# Patient Record
Sex: MALE | Race: ASIAN | NOT HISPANIC OR LATINO | Employment: FULL TIME | URBAN - METROPOLITAN AREA
[De-identification: names, ages, dates, MRNs, and addresses within clinical notes are randomized per-mention and may not be internally consistent; named-entity substitution may affect disease eponyms.]

---

## 2017-05-30 ENCOUNTER — OFFICE VISIT (OUTPATIENT)
Dept: FAMILY MEDICINE | Facility: CLINIC | Age: 62
End: 2017-05-30
Payer: COMMERCIAL

## 2017-05-30 VITALS
HEIGHT: 70 IN | DIASTOLIC BLOOD PRESSURE: 96 MMHG | RESPIRATION RATE: 12 BRPM | SYSTOLIC BLOOD PRESSURE: 146 MMHG | HEART RATE: 73 BPM | WEIGHT: 172.7 LBS | BODY MASS INDEX: 24.73 KG/M2 | OXYGEN SATURATION: 97 % | TEMPERATURE: 98.5 F

## 2017-05-30 DIAGNOSIS — M21.612 BUNION, LEFT: ICD-10-CM

## 2017-05-30 DIAGNOSIS — Z11.59 NEED FOR HEPATITIS C SCREENING TEST: ICD-10-CM

## 2017-05-30 DIAGNOSIS — Z00.01 ENCOUNTER FOR ROUTINE ADULT HEALTH EXAMINATION WITH ABNORMAL FINDINGS: Primary | ICD-10-CM

## 2017-05-30 DIAGNOSIS — M21.611 BUNION, RIGHT: ICD-10-CM

## 2017-05-30 DIAGNOSIS — R03.0 ELEVATED BLOOD PRESSURE READING WITHOUT DIAGNOSIS OF HYPERTENSION: ICD-10-CM

## 2017-05-30 PROCEDURE — 99396 PREV VISIT EST AGE 40-64: CPT | Performed by: PHYSICIAN ASSISTANT

## 2017-05-30 NOTE — MR AVS SNAPSHOT
After Visit Summary   5/30/2017    Shelia Ortiz    MRN: [id number]           Patient Information     Date Of Birth          1955        Visit Information        Provider Department      5/30/2017 11:20 AM Georgia Burleson PA-C [facility]        Today's Diagnoses     Need for hepatitis C screening test    -  1    Encounter for routine adult health examination with abnormal findings        Elevated blood pressure reading without diagnosis of hypertension          Care Instructions    Please schedule a fasting lab appointment (nothing to eat or drink 12 hours prior except water and/or your medications) at your earliest convenience.       No more than 2 grams of sodium daily.  Schedule MA visit in 3-4 weeks if either number greater than 140/90   Work on diet and exercise    Bunion --consider follow up with podiatry     Preventive Health Recommendations  Male Ages 50 - 64    Yearly exam:             See your health care provider every year in order to  o   Review health changes.   o   Discuss preventive care.    o   Review your medicines if your doctor has prescribed any.     Have a cholesterol test every 5 years, or more frequently if you are at risk for high cholesterol/heart disease.     Have a diabetes test (fasting glucose) every three years. If you are at risk for diabetes, you should have this test more often.     Have a colonoscopy at age 50, or have a yearly FIT test (stool test). These exams will check for colon cancer.      Talk with your health care provider about whether or not a prostate cancer screening test (PSA) is right for you.    You should be tested each year for STDs (sexually transmitted diseases), if you re at risk.     Shots: Get a flu shot each year. Get a tetanus shot every 10 years.     Nutrition:    Eat at least 5 servings of fruits and vegetables daily.     Eat whole-grain bread, whole-wheat pasta and brown rice instead of white grains and rice.      Talk to your provider about Calcium and Vitamin D.     Lifestyle    Exercise for at least 150 minutes a week (30 minutes a day, 5 days a week). This will help you control your weight and prevent disease.     Limit alcohol to one drink per day.     No smoking.     Wear sunscreen to prevent skin cancer.     See your dentist every six months for an exam and cleaning.     See your eye doctor every 1 to 2 years.            Follow-ups after your visit        Future tests that were ordered for you today     Open Future Orders        Priority Expected Expires Ordered    GLUCOSE Routine  5/30/2018 5/30/2017    Hepatitis C Screen Reflex to HCV RNA Quant and Genotype Routine  5/30/2018 5/30/2017    Lipid panel reflex to direct LDL Routine  5/30/2018 5/30/2017    PSA, screen Routine  5/30/2018 5/30/2017    Comprehensive metabolic panel Routine  5/30/2018 5/30/2017            Who to contact     If you have questions or need follow up information about today's clinic visit or your schedule please contact [facility] directly at [phone].  Normal or non-critical lab and imaging results will be communicated to you by FRESShart, letter or phone within 4 business days after the clinic has received the results. If you do not hear from us within 7 days, please contact the clinic through EQ workst or phone. If you have a critical or abnormal lab result, we will notify you by phone as soon as possible.  Submit refill requests through [employer] or call your pharmacy and they will forward the refill request to us. Please allow 3 business days for your refill to be completed.          Additional Information About Your Visit        [employer] Information     [employer] gives you secure access to your electronic health record. If you see a primary care provider, you can also send messages to your care team and make appointments. If you have questions, please call your primary care clinic.  If you do not have a primary care  "provider, please call [phone] and they will assist you.        Care EveryWhere ID     This is your Care EveryWhere ID. This could be used by other organizations to access your [place] medical records  [id number]        Your Vitals Were     Pulse Temperature Respirations Height Pulse Oximetry BMI (Body Mass Index)    73 98.5  F (36.9  C) (Oral) 12 1.778 m (5' 10\") 97% 24.78 kg/m2       Blood Pressure from Last 3 Encounters:   05/30/17 (!) 146/96   10/24/13 (!) 129/91   01/30/13 132/66    Weight from Last 3 Encounters:   05/30/17 78.3 kg (172 lb 11.2 oz)   10/17/13 72.6 kg (160 lb)   01/30/13 76 kg (167 lb 9.6 oz)               Primary Care Provider Office Phone #    [facility] [phone]       No address on file        Thank you!     Thank you for choosing [facility]  for your care. Our goal is always to provide you with excellent care. Hearing back from our patients is one way we can continue to improve our services. Please take a few minutes to complete the written survey that you may receive in the mail after your visit with us. Thank you!             Your Updated Medication List - Protect others around you: Learn how to safely use, store and throw away your medicines at www.disposemymeds.org.          This list is accurate as of: 5/30/17 11:26 AM.  Always use your most recent med list.                   Brand Name Dispense Instructions for use    DAILY MULTIVITAMIN PO      Take 1 tablet by mouth daily.       omega-3 fatty acids 1200 MG capsule      Take 1 capsule by mouth daily.         "

## 2017-05-30 NOTE — PROGRESS NOTES
SUBJECTIVE:     CC: Shelia Ortiz is an 61 year old male who presents for preventative health visit.     Healthy Habits:    Do you get at least three servings of calcium containing foods daily (dairy, green leafy vegetables, etc.)? yes    Amount of exercise or daily activities, outside of work: 2 day(s) per week    Problems taking medications regularly No    Medication side effects: No    Have you had an eye exam in the past two years? yes    Do you see a dentist twice per year? yes    Do you have sleep apnea, excessive snoring or daytime drowsiness?no            Today's PHQ-2 Score:   PHQ-2 ( 1999 Pfizer) 1/30/2013 12/8/2011   Q1: Little interest or pleasure in doing things 0 0   Q2: Feeling down, depressed or hopeless 0 0   PHQ-2 Score 0 0       Abuse: Current or Past(Physical, Sexual or Emotional)- No  Do you feel safe in your environment - Yes    Social History   Substance Use Topics     Smoking status: Never Smoker     Smokeless tobacco: Never Used     Alcohol use No     The patient does not drink >3 drinks per day nor >7 drinks per week.    Last PSA:   PSA   Date Value Ref Range Status   01/30/2013 0.74 0 - 4 ug/L Final       Recent Labs   Lab Test  01/30/13   1046  12/08/11   1057   CHOL  184  184   HDL  37*  36*   LDL  114  116   TRIG  159*  157*   CHOLHDLRATIO  4.9  5.0       Reviewed orders with patient. Reviewed health maintenance and updated orders accordingly - Yes    Reviewed and updated as needed this visit by clinical staff  Tobacco  Allergies  Meds  Med Hx  Surg Hx  Fam Hx  Soc Hx        Reviewed and updated as needed this visit by Provider        Has noted enlargement of feet at MTP of both great toes.  Reports when initially bought shoes they seemed a bit large in width but over the years has noted seem tight at MTP of both feet.  No pain.  Wonders about gout.  Has family history  Of gout    Lesion left side of neck seemed to get hard and then fell off and recurred.  Not increasing in size  "or changing in appearance but was concerned because hard and now soft.      ROS:  C: NEGATIVE for fever, chills, change in weight  I: NEGATIVE for worrisome rashes, moles or lesions  E: NEGATIVE for vision changes or irritation  ENT: NEGATIVE for ear, mouth and throat problems  R: NEGATIVE for significant cough or SOB  CV: NEGATIVE for chest pain, palpitations or peripheral edema  GI: NEGATIVE for nausea, abdominal pain, heartburn, or change in bowel habits   male: negative for dysuria, hematuria, decreased urinary stream, erectile dysfunction, urethral discharge  M: NEGATIVE for significant arthralgias or myalgia  N: NEGATIVE for weakness, dizziness or paresthesias  P: NEGATIVE for changes in mood or affect    Problem list, Medication list, Allergies, and Medical/Social/Surgical histories reviewed in EPIC and updated as appropriate.  OBJECTIVE:     /86 (BP Location: Right arm, Patient Position: Chair, Cuff Size: Adult Regular)  Pulse 73  Temp 98.5  F (36.9  C) (Oral)  Resp 12  Ht 1.778 m (5' 10\")  Wt 78.3 kg (172 lb 11.2 oz)  SpO2 97%  BMI 24.78 kg/m2  EXAM:  GENERAL: healthy, alert and no distress  EYES: Eyes grossly normal to inspection, PERRL and conjunctivae and sclerae normal  HENT: ear canals and TM's normal, nose and mouth without ulcers or lesions  NECK: no adenopathy, no asymmetry, masses, or scars and thyroid normal to palpation  RESP: lungs clear to auscultation - no rales, rhonchi or wheezes  CV: regular rate and rhythm, normal S1 S2, no S3 or S4, no murmur, click or rub, no peripheral edema and peripheral pulses strong  ABDOMEN: soft, nontender, no hepatosplenomegaly, no masses and bowel sounds normal   (male): normal male genitalia without lesions or urethral discharge, no hernia  RECTAL: normal sphincter tone, no rectal masses, prostate normal size, smooth, nontender without nodules or masses  MS: bilateral at MTP of both great toes with large nontender firm nodules, no erythema or " "warmth   SKIN: no suspicious lesions or rashes  NEURO: Normal strength and tone, mentation intact and speech normal  PSYCH: mentation appears normal, affect normal/bright    ASSESSMENT/PLAN:     1. Encounter for routine adult health examination with abnormal findings  Will return for fasting labs   - GLUCOSE; Future  - Hepatitis C Screen Reflex to HCV RNA Quant and Genotype; Future  - Lipid panel reflex to direct LDL; Future  - PSA, screen; Future  - Comprehensive metabolic panel; Future  - Vitamin D Deficiency; Future    2. Elevated blood pressure reading without diagnosis of hypertension  Does eat out with traveling and admittedly likes salt.  First hypertensive reading but family history  Of CVA, heart and brother recently started on antihypertensive  - Comprehensive metabolic panel; Future    3. Need for hepatitis C screening test  Will screen for hepatitis C when returns for fasting labs     4. Lawanda, left  Reassured -offered podiatry declines at this time     5. Lawanda, right  As above       COUNSELING:  Reviewed preventive health counseling, as reflected in patient instructions       Regular exercise       Healthy diet/nutrition       Vision screening       Consider Hep C screening for patients born between 1945 and 1965    BP Screening:   Last 3 BP Readings:    BP Readings from Last 3 Encounters:   05/30/17 (!) 146/96   10/24/13 (!) 129/91   01/30/13 132/66       The following was recommended to the patient:  Re-screen within 4 weeks and recommend lifestyle modifications     reports that he has never smoked. He has never used smokeless tobacco.    Estimated body mass index is 24.78 kg/(m^2) as calculated from the following:    Height as of this encounter: 1.778 m (5' 10\").    Weight as of this encounter: 78.3 kg (172 lb 11.2 oz).       Counseling Resources:  ATP IV Guidelines  Pooled Cohorts Equation Calculator  FRAX Risk Assessment  ICSI Preventive Guidelines  Dietary Guidelines for Americans, 2010  USDA's " MyPlate  ASA Prophylaxis  Lung CA Screening    Georgia Burleson PA-C  [facility]

## 2017-05-30 NOTE — NURSING NOTE
"Chief Complaint   Patient presents with     Physical     non fasting       Initial /86 (BP Location: Right arm, Patient Position: Chair, Cuff Size: Adult Regular)  Pulse 73  Temp 98.5  F (36.9  C) (Oral)  Resp 12  Ht 1.778 m (5' 10\")  Wt 78.3 kg (172 lb 11.2 oz)  SpO2 97%  BMI 24.78 kg/m2 Estimated body mass index is 24.78 kg/(m^2) as calculated from the following:    Height as of this encounter: 1.778 m (5' 10\").    Weight as of this encounter: 78.3 kg (172 lb 11.2 oz).  Medication Reconciliation: complete     Heidi Benavidez MA    "

## 2017-05-30 NOTE — PATIENT INSTRUCTIONS
Please schedule a fasting lab appointment (nothing to eat or drink 12 hours prior except water and/or your medications) at your earliest convenience.       No more than 2 grams of sodium daily.  Schedule MA visit in 3-4 weeks if either number greater than 140/90   Work on diet and exercise    Bunion --consider follow up with podiatry     Preventive Health Recommendations  Male Ages 50 - 64    Yearly exam:             See your health care provider every year in order to  o   Review health changes.   o   Discuss preventive care.    o   Review your medicines if your doctor has prescribed any.     Have a cholesterol test every 5 years, or more frequently if you are at risk for high cholesterol/heart disease.     Have a diabetes test (fasting glucose) every three years. If you are at risk for diabetes, you should have this test more often.     Have a colonoscopy at age 50, or have a yearly FIT test (stool test). These exams will check for colon cancer.      Talk with your health care provider about whether or not a prostate cancer screening test (PSA) is right for you.    You should be tested each year for STDs (sexually transmitted diseases), if you re at risk.     Shots: Get a flu shot each year. Get a tetanus shot every 10 years.     Nutrition:    Eat at least 5 servings of fruits and vegetables daily.     Eat whole-grain bread, whole-wheat pasta and brown rice instead of white grains and rice.     Talk to your provider about Calcium and Vitamin D.     Lifestyle    Exercise for at least 150 minutes a week (30 minutes a day, 5 days a week). This will help you control your weight and prevent disease.     Limit alcohol to one drink per day.     No smoking.     Wear sunscreen to prevent skin cancer.     See your dentist every six months for an exam and cleaning.     See your eye doctor every 1 to 2 years.

## 2017-05-31 DIAGNOSIS — R03.0 ELEVATED BLOOD PRESSURE READING WITHOUT DIAGNOSIS OF HYPERTENSION: ICD-10-CM

## 2017-05-31 DIAGNOSIS — Z00.01 ENCOUNTER FOR ROUTINE ADULT HEALTH EXAMINATION WITH ABNORMAL FINDINGS: ICD-10-CM

## 2017-05-31 LAB
ALBUMIN SERPL-MCNC: 4 G/DL (ref 3.4–5)
ALP SERPL-CCNC: 92 U/L (ref 40–150)
ALT SERPL W P-5'-P-CCNC: 39 U/L (ref 0–70)
ANION GAP SERPL CALCULATED.3IONS-SCNC: 5 MMOL/L (ref 3–14)
AST SERPL W P-5'-P-CCNC: 18 U/L (ref 0–45)
BILIRUB SERPL-MCNC: 0.5 MG/DL (ref 0.2–1.3)
BUN SERPL-MCNC: 13 MG/DL (ref 7–30)
CALCIUM SERPL-MCNC: 8.8 MG/DL (ref 8.5–10.1)
CHLORIDE SERPL-SCNC: 106 MMOL/L (ref 94–109)
CHOLEST SERPL-MCNC: 165 MG/DL
CO2 SERPL-SCNC: 30 MMOL/L (ref 20–32)
CREAT SERPL-MCNC: 1.17 MG/DL (ref 0.66–1.25)
DEPRECATED CALCIDIOL+CALCIFEROL SERPL-MC: 21 UG/L (ref 20–75)
GFR SERPL CREATININE-BSD FRML MDRD: 64 ML/MIN/1.7M2
GLUCOSE SERPL-MCNC: 102 MG/DL (ref 70–99)
HCV AB SERPL QL IA: NORMAL
HDLC SERPL-MCNC: 44 MG/DL
LDLC SERPL CALC-MCNC: 78 MG/DL
NONHDLC SERPL-MCNC: 121 MG/DL
POTASSIUM SERPL-SCNC: 4 MMOL/L (ref 3.4–5.3)
PROT SERPL-MCNC: 7.2 G/DL (ref 6.8–8.8)
PSA SERPL-ACNC: 0.61 UG/L (ref 0–4)
SODIUM SERPL-SCNC: 141 MMOL/L (ref 133–144)
TRIGL SERPL-MCNC: 215 MG/DL

## 2017-05-31 PROCEDURE — 80053 COMPREHEN METABOLIC PANEL: CPT | Performed by: PHYSICIAN ASSISTANT

## 2017-05-31 PROCEDURE — 36415 COLL VENOUS BLD VENIPUNCTURE: CPT | Performed by: PHYSICIAN ASSISTANT

## 2017-05-31 PROCEDURE — 82306 VITAMIN D 25 HYDROXY: CPT | Performed by: PHYSICIAN ASSISTANT

## 2017-05-31 PROCEDURE — 80061 LIPID PANEL: CPT | Performed by: PHYSICIAN ASSISTANT

## 2017-05-31 PROCEDURE — G0103 PSA SCREENING: HCPCS | Performed by: PHYSICIAN ASSISTANT

## 2017-05-31 PROCEDURE — 86803 HEPATITIS C AB TEST: CPT | Performed by: PHYSICIAN ASSISTANT

## 2017-06-01 NOTE — PROGRESS NOTES
Shelia,  I'm covering for Georgia while she is out of the office, and I took a look at your lab results.  Everything looks great.  Keep up the good work.  DESEAN Finnegan M.D.

## 2019-01-18 ENCOUNTER — OFFICE VISIT (OUTPATIENT)
Dept: FAMILY MEDICINE | Facility: CLINIC | Age: 64
End: 2019-01-18
Payer: COMMERCIAL

## 2019-01-18 VITALS
HEIGHT: 70 IN | HEART RATE: 75 BPM | DIASTOLIC BLOOD PRESSURE: 79 MMHG | SYSTOLIC BLOOD PRESSURE: 139 MMHG | WEIGHT: 175.7 LBS | TEMPERATURE: 98.8 F | OXYGEN SATURATION: 96 % | RESPIRATION RATE: 18 BRPM | BODY MASS INDEX: 25.15 KG/M2

## 2019-01-18 DIAGNOSIS — Z13.21 ENCOUNTER FOR VITAMIN DEFICIENCY SCREENING: ICD-10-CM

## 2019-01-18 DIAGNOSIS — L98.9 SKIN LESION: ICD-10-CM

## 2019-01-18 DIAGNOSIS — R03.0 ELEVATED BLOOD PRESSURE READING WITHOUT DIAGNOSIS OF HYPERTENSION: ICD-10-CM

## 2019-01-18 DIAGNOSIS — Z00.00 ENCOUNTER FOR ROUTINE ADULT HEALTH EXAMINATION WITHOUT ABNORMAL FINDINGS: Primary | ICD-10-CM

## 2019-01-18 DIAGNOSIS — Z12.5 SCREENING FOR PROSTATE CANCER: ICD-10-CM

## 2019-01-18 DIAGNOSIS — Z13.220 SCREENING FOR HYPERLIPIDEMIA: ICD-10-CM

## 2019-01-18 DIAGNOSIS — Z12.11 SCREEN FOR COLON CANCER: ICD-10-CM

## 2019-01-18 DIAGNOSIS — Z13.1 SCREENING FOR DIABETES MELLITUS: ICD-10-CM

## 2019-01-18 LAB
ALBUMIN SERPL-MCNC: 4 G/DL (ref 3.4–5)
ALP SERPL-CCNC: 101 U/L (ref 40–150)
ALT SERPL W P-5'-P-CCNC: 38 U/L (ref 0–70)
ANION GAP SERPL CALCULATED.3IONS-SCNC: 5 MMOL/L (ref 3–14)
AST SERPL W P-5'-P-CCNC: 23 U/L (ref 0–45)
BILIRUB SERPL-MCNC: 0.5 MG/DL (ref 0.2–1.3)
BUN SERPL-MCNC: 17 MG/DL (ref 7–30)
CALCIUM SERPL-MCNC: 8.4 MG/DL (ref 8.5–10.1)
CHLORIDE SERPL-SCNC: 105 MMOL/L (ref 94–109)
CHOLEST SERPL-MCNC: 196 MG/DL
CO2 SERPL-SCNC: 31 MMOL/L (ref 20–32)
CREAT SERPL-MCNC: 1.08 MG/DL (ref 0.66–1.25)
DEPRECATED CALCIDIOL+CALCIFEROL SERPL-MC: 16 UG/L (ref 20–75)
GFR SERPL CREATININE-BSD FRML MDRD: 73 ML/MIN/{1.73_M2}
GLUCOSE SERPL-MCNC: 101 MG/DL (ref 70–99)
HDLC SERPL-MCNC: 38 MG/DL
LDLC SERPL CALC-MCNC: 106 MG/DL
NONHDLC SERPL-MCNC: 158 MG/DL
POTASSIUM SERPL-SCNC: 3.7 MMOL/L (ref 3.4–5.3)
PROT SERPL-MCNC: 7.5 G/DL (ref 6.8–8.8)
PSA SERPL-ACNC: 0.92 UG/L (ref 0–4)
SODIUM SERPL-SCNC: 141 MMOL/L (ref 133–144)
TRIGL SERPL-MCNC: 262 MG/DL

## 2019-01-18 PROCEDURE — 82306 VITAMIN D 25 HYDROXY: CPT | Performed by: PHYSICIAN ASSISTANT

## 2019-01-18 PROCEDURE — G0103 PSA SCREENING: HCPCS | Performed by: PHYSICIAN ASSISTANT

## 2019-01-18 PROCEDURE — 36415 COLL VENOUS BLD VENIPUNCTURE: CPT | Performed by: PHYSICIAN ASSISTANT

## 2019-01-18 PROCEDURE — 80053 COMPREHEN METABOLIC PANEL: CPT | Performed by: PHYSICIAN ASSISTANT

## 2019-01-18 PROCEDURE — 80061 LIPID PANEL: CPT | Performed by: PHYSICIAN ASSISTANT

## 2019-01-18 PROCEDURE — 99396 PREV VISIT EST AGE 40-64: CPT | Performed by: PHYSICIAN ASSISTANT

## 2019-01-18 ASSESSMENT — MIFFLIN-ST. JEOR: SCORE: 1608.22

## 2019-01-18 ASSESSMENT — PAIN SCALES - GENERAL: PAINLEVEL: NO PAIN (0)

## 2019-01-18 NOTE — PATIENT INSTRUCTIONS
Schedule colonoscopy at [facility] ([phone]).       I will notify you of lab results via [employer]   Follow up with us if blood pressure >140/90    Check into insurance coverage for shingles vaccine and schedule nurse only visit for series    Follow up with dermatology at [facility] ([phone]) for skin lesion    Preventive Health Recommendations  Male Ages 50 - 64    Yearly exam:             See your health care provider every year in order to  o   Review health changes.   o   Discuss preventive care.    o   Review your medicines if your doctor has prescribed any.     Have a cholesterol test every 5 years, or more frequently if you are at risk for high cholesterol/heart disease.     Have a diabetes test (fasting glucose) every three years. If you are at risk for diabetes, you should have this test more often.     Have a colonoscopy at age 50, or have a yearly FIT test (stool test). These exams will check for colon cancer.      Talk with your health care provider about whether or not a prostate cancer screening test (PSA) is right for you.    You should be tested each year for STDs (sexually transmitted diseases), if you re at risk.     Shots: Get a flu shot each year. Get a tetanus shot every 10 years.     Nutrition:    Eat at least 5 servings of fruits and vegetables daily.     Eat whole-grain bread, whole-wheat pasta and brown rice instead of white grains and rice.     Get adequate Calcium and Vitamin D.     Lifestyle    Exercise for at least 150 minutes a week (30 minutes a day, 5 days a week). This will help you control your weight and prevent disease.     Limit alcohol to one drink per day.     No smoking.     Wear sunscreen to prevent skin cancer.     See your dentist every six months for an exam and cleaning.     See your eye doctor every 1 to 2 years.

## 2019-01-18 NOTE — PROGRESS NOTES
SUBJECTIVE:   CC: Shelia Ortiz is an 63 year old male who presents for preventive health visit.     Healthy Habits:    Do you get at least three servings of calcium containing foods daily (dairy, green leafy vegetables, etc.)? yes    Amount of exercise or daily activities, outside of work: 1 day(s) per week    Problems taking medications regularly No    Medication side effects: No    Have you had an eye exam in the past two years? yes    Do you see a dentist twice per year? yes    Do you have sleep apnea, excessive snoring or daytime drowsiness?no          Today's PHQ-2 Score:   PHQ-2 ( 1999 Pfizer) 5/30/2017 1/30/2013   Q1: Little interest or pleasure in doing things 0 0   Q2: Feeling down, depressed or hopeless 0 0   PHQ-2 Score 0 0       Abuse: Current or Past(Physical, Sexual or Emotional)- No  Do you feel safe in your environment? Yes    Social History     Tobacco Use     Smoking status: Never Smoker     Smokeless tobacco: Never Used   Substance Use Topics     Alcohol use: No     If you drink alcohol do you typically have >3 drinks per day or >7 drinks per week? No                      Last PSA:   PSA   Date Value Ref Range Status   05/31/2017 0.61 0 - 4 ug/L Final     Comment:     Assay Method:  Chemiluminescence using Siemens Vista analyzer       Reviewed orders with patient. Reviewed health maintenance and updated orders accordingly - Yes  BP Readings from Last 3 Encounters:   01/18/19 139/79   05/30/17 (!) 146/96   10/24/13 (!) 129/91    Wt Readings from Last 3 Encounters:   01/18/19 79.7 kg (175 lb 11.2 oz)   05/30/17 78.3 kg (172 lb 11.2 oz)   10/17/13 72.6 kg (160 lb)                  Patient Active Problem List   Diagnosis     Hyperlipidemia LDL goal <130     Atopic dermatitis     Advanced directives, counseling/discussion     Elevated blood pressure reading without diagnosis of hypertension     Past Surgical History:   Procedure Laterality Date     COLONOSCOPY  10/24/2013    Procedure: COLONOSCOPY;   Tubular adenoma of colon  pkt sent 10/7  rx sent  ;  Surgeon: Lenin Lopez MD;  Location: [facility]     NO HISTORY OF SURGERY         Social History     Tobacco Use     Smoking status: Never Smoker     Smokeless tobacco: Never Used   Substance Use Topics     Alcohol use: No     Family History   Problem Relation Age of Onset     Hypertension Mother          age 86-stroke      Diabetes Mother         developed in her 70s, now age 77 or 78     Cerebrovascular Disease Mother      Hypertension Father      Cancer - colorectal Father         age 74 or 75     Hypertension Brother      Asthma No family hx of      C.A.D. No family hx of      Breast Cancer No family hx of      Prostate Cancer No family hx of      Thyroid Disease No family hx of      Osteoporosis No family hx of          Current Outpatient Medications   Medication Sig Dispense Refill     Omega-3 Fatty Acids (FISH OIL) 1200 MG capsule Take 1 capsule by mouth daily.         Reviewed and updated as needed this visit by clinical staff  Tobacco  Allergies  Meds  Med Hx  Surg Hx  Fam Hx  Soc Hx        Reviewed and updated as needed this visit by Provider  Tobacco  Allergies  Meds  Problems  Med Hx  Surg Hx  Fam Hx  Soc Hx           During summer blood pressure fine.   But in winter over 140/90  Ok when going to dentist.  Thanksgiving over 140      Influenza vaccination 2-3 months ago    ROS:  CONSTITUTIONAL: NEGATIVE for fever, chills, change in weight  INTEGUMENTARY/SKIN: NEGATIVE for worrisome rashes, moles or lesions  EYES: NEGATIVE for vision changes or irritation  ENT: NEGATIVE for ear, mouth and throat problems  RESP: NEGATIVE for significant cough or SOB  CV: NEGATIVE for chest pain, palpitations or peripheral edema  GI: NEGATIVE for nausea, abdominal pain, heartburn, or change in bowel habits   male: negative for dysuria, hematuria, decreased urinary stream, erectile dysfunction, urethral discharge  MUSCULOSKELETAL: NEGATIVE for  "significant arthralgias or myalgia  NEURO: NEGATIVE for weakness, dizziness or paresthesias  PSYCHIATRIC: NEGATIVE for changes in mood or affect    OBJECTIVE:   /79 (BP Location: Right arm, Patient Position: Sitting, Cuff Size: Adult Regular)   Pulse 75   Temp 98.8  F (37.1  C) (Oral)   Resp 18   Ht 1.778 m (5' 10\")   Wt 79.7 kg (175 lb 11.2 oz)   SpO2 96%   BMI 25.21 kg/m    EXAM:  GENERAL: healthy, alert and no distress  EYES: Eyes grossly normal to inspection, PERRL and conjunctivae and sclerae normal  HENT: ear canals and TM's normal, nose and mouth without ulcers or lesions  NECK: no adenopathy, no asymmetry, masses, or scars and thyroid normal to palpation  RESP: lungs clear to auscultation - no rales, rhonchi or wheezes  CV: regular rate and rhythm, normal S1 S2, no S3 or S4, no murmur, click or rub, no peripheral edema and peripheral pulses strong  ABDOMEN: soft, nontender, no hepatosplenomegaly, no masses and bowel sounds normal   (male): normal male genitalia without lesions or urethral discharge, no hernia  RECTAL: normal sphincter tone, no rectal masses, prostate normal size, smooth, nontender without nodules or masses  MS: no gross musculoskeletal defects noted, no edema  SKIN: left lower back with single erythematous plaque with scale approximately 5 centimeter diameter oval   NEURO: Normal strength and tone, mentation intact and speech normal  PSYCH: mentation appears normal, affect normal/bright    Diagnostic Test Results:  Results for orders placed or performed in visit on 01/18/19   Comprehensive metabolic panel   Result Value Ref Range    Sodium 141 133 - 144 mmol/L    Potassium 3.7 3.4 - 5.3 mmol/L    Chloride 105 94 - 109 mmol/L    Carbon Dioxide 31 20 - 32 mmol/L    Anion Gap 5 3 - 14 mmol/L    Glucose 101 (H) 70 - 99 mg/dL    Urea Nitrogen 17 7 - 30 mg/dL    Creatinine 1.08 0.66 - 1.25 mg/dL    GFR Estimate 73 >60 mL/min/[1.73_m2]    GFR Estimate If Black 85 >60 mL/min/[1.73_m2] "    Calcium 8.4 (L) 8.5 - 10.1 mg/dL    Bilirubin Total 0.5 0.2 - 1.3 mg/dL    Albumin 4.0 3.4 - 5.0 g/dL    Protein Total 7.5 6.8 - 8.8 g/dL    Alkaline Phosphatase 101 40 - 150 U/L    ALT 38 0 - 70 U/L    AST 23 0 - 45 U/L   Vitamin D Deficiency   Result Value Ref Range    Vitamin D Deficiency screening 16 (L) 20 - 75 ug/L   Lipid panel reflex to direct LDL Fasting   Result Value Ref Range    Cholesterol 196 <200 mg/dL    Triglycerides 262 (H) <150 mg/dL    HDL Cholesterol 38 (L) >39 mg/dL    LDL Cholesterol Calculated 106 (H) <100 mg/dL    Non HDL Cholesterol 158 (H) <130 mg/dL   PSA, screen   Result Value Ref Range    PSA 0.92 0 - 4 ug/L       ASSESSMENT/PLAN:   1. Encounter for routine adult health examination without abnormal findings  Declines HIV screening     2. Elevated blood pressure reading without diagnosis of hypertension  Blood pressure ok here but encouraged to monitor and follow up with us if getting elevated readings   - Comprehensive metabolic panel    3. Screen for colon cancer    - GASTROENTEROLOGY ADULT REF PROCEDURE ONLY [facility] [phone]; No Provider Preference    4. Screening for hyperlipidemia    - Lipid panel reflex to direct LDL Fasting    5. Screening for diabetes mellitus    - Comprehensive metabolic panel    6. Screening for prostate cancer    - PSA, screen    7. Encounter for vitamin deficiency screening    - Vitamin D Deficiency    8. Skin lesion  Referred to derm for further evaluation and treatment   - DERMATOLOGY REFERRAL    COUNSELING:  Reviewed preventive health counseling, as reflected in patient instructions       Regular exercise       Healthy diet/nutrition       Vision screening       HIV screeninx in teen years, 1x in adult years, and at intervals if high risk       Colon cancer screening       Prostate cancer screening    BP Readings from Last 1 Encounters:   19 139/79     Estimated body mass index is 25.21 kg/m  as calculated from the following:     "Height as of this encounter: 1.778 m (5' 10\").    Weight as of this encounter: 79.7 kg (175 lb 11.2 oz).           reports that  has never smoked. he has never used smokeless tobacco.      Counseling Resources:  ATP IV Guidelines  Pooled Cohorts Equation Calculator  FRAX Risk Assessment  ICSI Preventive Guidelines  Dietary Guidelines for Americans, 2010  USDA's MyPlate  ASA Prophylaxis  Lung CA Screening    Georgia Burleson PA-C  [facility]  "

## 2019-01-19 NOTE — RESULT ENCOUNTER NOTE
"Taryn Herrera  Your vitamin D level was quite low.  I recommend taking 63178 units vitamin D once a week for  8 weeks then 2000 units vitamin D daily and recheck a level in approximately 3 months   Your prostate cancer screening test was normal.   Your electrolytes and kidney function were normal.   Your blood sugar is borderline elevated.    This is in the \"prediabetes\" range.    Exercise and limiting carbohydrates and sugars in the diet can be helpful to avoid progression to diabetes.  At minimum we need to check your blood sugar annually.    Your cholesterol is quite high especially the triglycerides.  Your triglycerides were above normal.  Poor diet, genetics and being overweight can contribute to this. Maintaining a healthy diet with lean proteins, whole grains and healthy fats such as olive oil as well as regular exercise and maintaining an appropriate weight all contribute to healthier cholesterol levels.    I do recommend medication to treat your cholesterol and high triglycerides.  I would recommend that we start with a medication like lipitor and recheck labs in approximately 8 weeks and if triglycerides still high add another medication  Please let me know your thoughts.   Please call or MyChart my office with any questions or concerns.    Georgia Burleson, PAC              "

## 2019-01-21 ENCOUNTER — TELEPHONE (OUTPATIENT)
Dept: FAMILY MEDICINE | Facility: CLINIC | Age: 64
End: 2019-01-21

## 2019-01-21 DIAGNOSIS — E78.1 HYPERTRIGLYCERIDEMIA: ICD-10-CM

## 2019-01-21 DIAGNOSIS — E78.5 HYPERLIPIDEMIA LDL GOAL <130: Primary | ICD-10-CM

## 2019-01-21 NOTE — TELEPHONE ENCOUNTER
..Reason for Call:   questions about the physical on 1-    Detailed comments: specifically about the vitamin D    Phone Number Patient can be reached at: Home number on file [phone] (home)    Best Time: anytime    Can we leave a detailed message on this number? YES    Call taken on 1/21/2019 at 7:41 AM by Lorena Orantes

## 2019-01-21 NOTE — TELEPHONE ENCOUNTER
This writer attempted to contact Javier on 01/21/19      Reason for call recommendations and left detailed message.      If patient calls back:   Patient contacted by a Registered Nurse. Inform patient that someone from the RN group will contact them, document that pt called and route to P DYAD 3 RN POOL [[id number]]        Mayte Jenkins RN

## 2019-01-21 NOTE — TELEPHONE ENCOUNTER
Patient is wondering if he can try OTC vitamin D at 2000 international unit(s) daily and than recheck it in 3 months. He also noticed that his calcium is low and is looking for recommendations on that result. Should he be taking additional calcium.    Gracie Correa RN, [facility] Triage

## 2019-01-22 NOTE — TELEPHONE ENCOUNTER
Calcium for Men and Postmenopausal women on estrogen: 1200mg/day over the counter   2000 units vitamin D over the counter daily    His calcium is low because his vitamin D level was low.   What did he want to do regarding cholesterol?

## 2019-01-22 NOTE — TELEPHONE ENCOUNTER
Spoke with patient on the phone. He would like to wait to start any medication for cholesterol. He is working hard currently to improve his diet and exercise and would like to try to control his cholesterol through diet and exercise instead of medication at this time.  Instead of checking his labs in 8 weeks he is requesting to wait to check them in 3-6 months instead. He is wondering if this should be a lab only appointment or if he also needs to see provider at this time?    Provider please review and advise.    Mayte Jenkins RN

## 2019-01-22 NOTE — TELEPHONE ENCOUNTER
Sorry for the confusion  2000 units vitamin D over the counter daily  Calcium supplementation as well

## 2019-01-22 NOTE — TELEPHONE ENCOUNTER
"Provider please clarify intended message \"recommend 2000 units calcium D\".  Is this a combined supplement patient can purchase OTC or did you intend message to mean something else?    Thank you,  Mayte Jenkins RN          Sure - can take over the counter vitamin D and calcium - recommend 2000 units calcium D daily   Calcium is low due to vitamin D being low.   Please advise per below.   What did he want to do for cholesterol?     "

## 2019-01-29 ENCOUNTER — HOSPITAL ENCOUNTER (OUTPATIENT)
Facility: AMBULATORY SURGERY CENTER | Age: 64
Discharge: HOME | End: 2019-01-29
Attending: SURGERY | Admitting: SURGERY
Payer: COMMERCIAL

## 2019-01-29 VITALS
HEART RATE: 62 BPM | SYSTOLIC BLOOD PRESSURE: 133 MMHG | OXYGEN SATURATION: 96 % | TEMPERATURE: 99 F | RESPIRATION RATE: 16 BRPM | DIASTOLIC BLOOD PRESSURE: 94 MMHG

## 2019-01-29 LAB — COLONOSCOPY: NORMAL

## 2019-01-29 PROCEDURE — G8918 PT W/O PREOP ORDER IV AB PRO: HCPCS

## 2019-01-29 PROCEDURE — G8907 PT DOC NO EVENTS ON DISCHARG: HCPCS

## 2019-01-29 PROCEDURE — 45381 COLONOSCOPY SUBMUCOUS NJX: CPT

## 2019-01-29 PROCEDURE — 99152 MOD SED SAME PHYS/QHP 5/>YRS: CPT | Mod: 59 | Performed by: SURGERY

## 2019-01-29 PROCEDURE — 45385 COLONOSCOPY W/LESION REMOVAL: CPT | Mod: PT | Performed by: SURGERY

## 2019-01-29 PROCEDURE — 45385 COLONOSCOPY W/LESION REMOVAL: CPT

## 2019-01-29 PROCEDURE — 45381 COLONOSCOPY SUBMUCOUS NJX: CPT | Mod: PT | Performed by: SURGERY

## 2019-01-29 PROCEDURE — 88305 TISSUE EXAM BY PATHOLOGIST: CPT | Performed by: SURGERY

## 2019-01-29 RX ORDER — FENTANYL CITRATE 50 UG/ML
INJECTION, SOLUTION INTRAMUSCULAR; INTRAVENOUS PRN
Status: DISCONTINUED | OUTPATIENT
Start: 2019-01-29 | End: 2019-01-29 | Stop reason: HOSPADM

## 2019-01-29 RX ORDER — ONDANSETRON 2 MG/ML
4 INJECTION INTRAMUSCULAR; INTRAVENOUS
Status: DISCONTINUED | OUTPATIENT
Start: 2019-01-29 | End: 2019-01-30 | Stop reason: HOSPADM

## 2019-01-29 RX ORDER — CHOLECALCIFEROL (VITAMIN D3) 50 MCG
1 TABLET ORAL DAILY
COMMUNITY
End: 2019-10-02

## 2019-01-29 RX ORDER — LIDOCAINE 40 MG/G
CREAM TOPICAL
Status: DISCONTINUED | OUTPATIENT
Start: 2019-01-29 | End: 2019-01-30 | Stop reason: HOSPADM

## 2019-01-31 LAB — COPATH REPORT: NORMAL

## 2019-02-14 ENCOUNTER — OFFICE VISIT (OUTPATIENT)
Dept: DERMATOLOGY | Facility: CLINIC | Age: 64
End: 2019-02-14
Attending: PHYSICIAN ASSISTANT
Payer: COMMERCIAL

## 2019-02-14 DIAGNOSIS — L20.84 INTRINSIC ATOPIC DERMATITIS: Primary | ICD-10-CM

## 2019-02-14 DIAGNOSIS — L82.1 SEBORRHEIC KERATOSIS: ICD-10-CM

## 2019-02-14 PROCEDURE — 99202 OFFICE O/P NEW SF 15 MIN: CPT | Performed by: DERMATOLOGY

## 2019-02-14 RX ORDER — TRIAMCINOLONE ACETONIDE 1 MG/G
OINTMENT TOPICAL
Qty: 80 G | Refills: 11 | Status: SHIPPED | OUTPATIENT
Start: 2019-02-14 | End: 2019-06-17

## 2019-02-14 RX ORDER — TACROLIMUS 1 MG/G
OINTMENT TOPICAL
Qty: 30 G | Refills: 11 | Status: SHIPPED | OUTPATIENT
Start: 2019-02-14 | End: 2019-06-17

## 2019-02-14 ASSESSMENT — PAIN SCALES - GENERAL: PAINLEVEL: NO PAIN (0)

## 2019-02-14 NOTE — PATIENT INSTRUCTIONS
I recommend a cream based moisturizer, like CeraVe or Vanicream to use on all of the skin, daily. This will help prevent more eczema from forming. Try to do this twice a day in the winter.     For a body soap, I recommend Dove bar soap, unscented for sensitive skin.     On the face, use the protopic ointment twice a day for red and itchy areas. If this medication is too expensive, call our office for an alternative medication.     For the body, on red, itchy, or scaly skin apply triamcinolone 0.1% ointment twice a day until the itch is gone.     Gentle Skin Care  Below is a list of products our providers recommend for gentle skin care.  Moisturizers:    Lighter; Cetaphil Cream, CeraVe, Aveeno and Vanicream Light     Thicker; Aquaphor Ointment, Vaseline, Petrolium Jelly, Eucerin and Vanicream    Avoid Lotions (too thin)  Mild Cleansers:    Dove- Fragrance Free    CeraVe     Vanicream Cleansing Bar    Cetaphil Cleanser     Aquaphor 2 in1 Gentle Wash and Shampoo       Laundry Products:    All Free and Clear    Cheer Free    Generic Brands are okay as long as they are  Fragrance Free      Avoid fabric softeners  and dryer sheets   Sunscreens: SPF 30 or greater     Sunscreens that contain Zinc Oxide or Titanium Dioxide should be applied, these are physical blockers. Spray or  chemical  sunscreens should be avoided.        Shampoo and Conditioners:    Free and Clear by Vanicream    Aquaphor 2 in 1 Gentle Wash and Shampoo Oils:    Mineral Oil     Emu Oil     For some patients, coconut and sunflower seed oil      Generic Products are an okay substitute, but make sure they are fragrance free.  *Avoid product that have fragrance added to them. Organic does not mean  fragrance free.  In fact patients with sensitive skin can become quite irritated by organic products.     1. Daily bathing is recommended. Make sure you are applying a good moisturizer after bathing every time.  2. Use Moisturizing creams at least twice daily to  the whole body.   3. Creams are more moisturizing than lotions  4. Products should be fragrance free- soaps, creams, detergents.   Care Plan:  1. Keep bathing and showering short, less than 15 minutes   2. Always use lukewarm warm when possible. AVOID very HOT or COLD water  3. DO NOT use bubble bath  4. Limit the use of soaps. Focus on the skin folds, face, armpits, groin and feet  5. Do NOT vigorously scrub when you cleanse your skin  6. After bathing, PAT your skin lightly with a towel. DO NOT rub or scrub when drying  7. ALWAYS apply a moisturizer immediately after bathing. This helps to  lock in  the moisture.  8. Reapply moisturizing agents at least twice daily to your whole body  9. Do not use products such as powders, perfumes, or colognes on your skin  10. Avoid saunas and steam baths. This temperature is too HOT  11. Avoid tight or  scratchy  clothing such as wool  12. Always wash new clothing before wearing them for the first time  13. Sometimes a humidifier or vaporizer can be used at night can help the dry skin. Remember to keep it clean to avoid mold growth.

## 2019-02-14 NOTE — NURSING NOTE
Shelia Ortiz's goals for this visit include:   Chief Complaint   Patient presents with     Derm Problem     redness and swelling on face and left flank       He requests these members of his care team be copied on today's visit information: NO    PCP: Georgia Burleson    Referring Provider:  Georgia Burleson PA-C  [address] [place] [address]    There were no vitals taken for this visit.    Do you need any medication refills at today's visit? NO    Marilyn Beasley [facility]

## 2019-02-14 NOTE — LETTER
2/14/2019         RE: Shelia Ortiz  [address]  [place] [address]        Dear Colleague,    Thank you for referring your patient, Shelia Ortiz, to the [facility]. Please see a copy of my visit note below.    [facility] Dermatology Note      Dermatology Problem List:  1. Eczema  - current tx: moisturizer cream based, gentle skin care, protopic 0.1% ointment for the face, triamcinolone 0.1% ointment BID for body    Encounter Date: Feb 14, 2019    CC:  Chief Complaint   Patient presents with     Derm Problem     redness and swelling on face and left flank         History of Present Illness:  Mr. Shelia Ortiz is a 63 year old male who presents as a referral from Georgia Burleson for a lesion on his lower back. It first happened 10 years ago when he moved here from [place], he went to a dermatologist and was prescribed creams to treat. The cream helped, but he does not remember the name. He noticed it flare up again in the last 2 years. He noticed the spot on his lower left side for about a year. He itches at the area. He has a new spot on his abdomen that is only a few days old and itchy. He uses Cetaphil moisturizer on his face. He does not use a moisturizer on his body. No other concerns addressed today.      Past Medical History:   Patient Active Problem List   Diagnosis     Hyperlipidemia LDL goal <130     Atopic dermatitis     Advanced directives, counseling/discussion     Elevated blood pressure reading without diagnosis of hypertension     Past Medical History:   Diagnosis Date     Hyperlipidemia LDL goal <130      Sebaceous cyst 3/20/08    left posterior shoulder     Past Surgical History:   Procedure Laterality Date     COLONOSCOPY  10/24/2013    Procedure: COLONOSCOPY;  Tubular adenoma of colon  pkt sent 10/7  rx sent  ;  Surgeon: Lenin Lopez MD;  Location: [facility] OR     COLONOSCOPY WITH CO2 INSUFFLATION N/A 1/29/2019    Procedure: COLONOSCOPY  WITH CO2 INSUFFLATION;  Surgeon: Kaleb Morelos MD;  Location: [facility] OR     NO HISTORY OF SURGERY         Social History:  Patient reports that  has never smoked. he has never used smokeless tobacco. He reports that he does not drink alcohol or use drugs.    Family History:  Family History   Problem Relation Age of Onset     Hypertension Mother          age 86-stroke      Diabetes Mother         developed in her 70s, now age 77 or 78     Cerebrovascular Disease Mother      Hypertension Father      Cancer - colorectal Father         age 74 or 75     Hypertension Brother      Asthma No family hx of      C.A.D. No family hx of      Breast Cancer No family hx of      Prostate Cancer No family hx of      Thyroid Disease No family hx of      Osteoporosis No family hx of        Medications:  Current Outpatient Medications   Medication Sig Dispense Refill     calcium carbonate-vitamin D (OS-JACQUE) 500-400 MG-UNIT tablet Take 1 tablet by mouth daily       Omega-3 Fatty Acids (FISH OIL) 1200 MG capsule Take 1 capsule by mouth daily.       vitamin D3 (CHOLECALCIFEROL) 2000 units tablet Take 1 tablet by mouth daily         No Known Allergies    Review of Systems:  -Constitutional: Patient is otherwise feeling well, in usual state of health.   -Skin: As above in HPI. No additional skin concerns.    Physical exam:  Vitals: There were no vitals taken for this visit.  GEN: This is a well developed, well-nourished male in no acute distress, in a pleasant mood.    SKIN: Waist-up skin, which includes the head/face, neck, both arms, chest, back, abdomen, digits and/or nails was examined.  - Diffusely xerotic  - Left lower back: 5 cm lichenified pink plaque consistent with LSC  - Lichenified plaques on the left and right lateral jaw line, the eyebrows and extending on to the upper eyelids bilaterally.  - Eczematous dermatitis on the central abdomen: 7 cm in size  - waxy stuck on papules on the trunk  - No other lesions of concern  on areas examined.       Impression/Plan:  1. Benign findings including: seborrheic keratoses.     Patient reassured of the benign nature of these lesions.    No further intervention required. Patient to report changes.     2. Eczema. Discussed the pathogenesis of the condition. Emphasized the importance of keeping the skin moisturized to prevent future flares.     Recommended a cream based moisturizer, like CeraVe or Vanicream daily to keep the skin hydrated.    Recommended gentle skin care recommended Dove bar soap    Gentle skin care handout provided.     For the body: prescribed triamcinolone 0.1%  to be used BID on red, itchy, or scaly spots until itch is resolved.     For the face: prescribed protopic 0.1% ointment to be used BID on areas of rash.    Recommended keeping a bandage over the area on the lower back to stop the itch cycle.  Given the thickness of this lesion, it is likely very long standing and will take time to go away.     If does not resolve in a month, patient advised to call our office for follow up .    CC Georgia Burleson PA-C on close of this encounter.  Follow-up prn.       Staff Involved:  Scribe/Staff    Scribe Disclosure  I, Lennie Luo, am serving as a scribe to document services personally performed by Dr. Nanette Alfaro MD, based on data collection and the provider's statements to me.     Provider Disclosure:   The documentation recorded by the scribe accurately reflects the services I personally performed and the decisions made by me.    Nanette Alfaro MD    Department of Dermatology  [facility] [place]: Phone: [phone], Fax:[phone]  [facility]: Phone: [phone], Fax: [phone]              Again, thank you for allowing me to participate in the care of your patient.        Sincerely,        Nanette Alfaro MD

## 2019-02-14 NOTE — PROGRESS NOTES
[facility] Dermatology Note      Dermatology Problem List:  1. Eczema  - current tx: moisturizer cream based, gentle skin care, protopic 0.1% ointment for the face, triamcinolone 0.1% ointment BID for body    Encounter Date: Feb 14, 2019    CC:  Chief Complaint   Patient presents with     Derm Problem     redness and swelling on face and left flank         History of Present Illness:  Mr. Shelia Ortiz is a 63 year old male who presents as a referral from Georgia Burleson for a lesion on his lower back. It first happened 10 years ago when he moved here from [place], he went to a dermatologist and was prescribed creams to treat. The cream helped, but he does not remember the name. He noticed it flare up again in the last 2 years. He noticed the spot on his lower left side for about a year. He itches at the area. He has a new spot on his abdomen that is only a few days old and itchy. He uses Cetaphil moisturizer on his face. He does not use a moisturizer on his body. No other concerns addressed today.      Past Medical History:   Patient Active Problem List   Diagnosis     Hyperlipidemia LDL goal <130     Atopic dermatitis     Advanced directives, counseling/discussion     Elevated blood pressure reading without diagnosis of hypertension     Past Medical History:   Diagnosis Date     Hyperlipidemia LDL goal <130      Sebaceous cyst 3/20/08    left posterior shoulder     Past Surgical History:   Procedure Laterality Date     COLONOSCOPY  10/24/2013    Procedure: COLONOSCOPY;  Tubular adenoma of colon  pkt sent 10/7  rx sent  ;  Surgeon: Lenin Lopez MD;  Location:  OR     COLONOSCOPY WITH CO2 INSUFFLATION N/A 1/29/2019    Procedure: COLONOSCOPY WITH CO2 INSUFFLATION;  Surgeon: Kaleb Morelos MD;  Location:  OR     NO HISTORY OF SURGERY         Social History:  Patient reports that  has never smoked. he has never used smokeless tobacco. He reports that he does not drink alcohol  or use drugs.    Family History:  Family History   Problem Relation Age of Onset     Hypertension Mother          age 86-stroke      Diabetes Mother         developed in her 70s, now age 77 or 78     Cerebrovascular Disease Mother      Hypertension Father      Cancer - colorectal Father         age 74 or 75     Hypertension Brother      Asthma No family hx of      C.A.D. No family hx of      Breast Cancer No family hx of      Prostate Cancer No family hx of      Thyroid Disease No family hx of      Osteoporosis No family hx of        Medications:  Current Outpatient Medications   Medication Sig Dispense Refill     calcium carbonate-vitamin D (OS-JACQUE) 500-400 MG-UNIT tablet Take 1 tablet by mouth daily       Omega-3 Fatty Acids (FISH OIL) 1200 MG capsule Take 1 capsule by mouth daily.       vitamin D3 (CHOLECALCIFEROL) 2000 units tablet Take 1 tablet by mouth daily         No Known Allergies    Review of Systems:  -Constitutional: Patient is otherwise feeling well, in usual state of health.   -Skin: As above in HPI. No additional skin concerns.    Physical exam:  Vitals: There were no vitals taken for this visit.  GEN: This is a well developed, well-nourished male in no acute distress, in a pleasant mood.    SKIN: Waist-up skin, which includes the head/face, neck, both arms, chest, back, abdomen, digits and/or nails was examined.  - Diffusely xerotic  - Left lower back: 5 cm lichenified pink plaque consistent with LSC  - Lichenified plaques on the left and right lateral jaw line, the eyebrows and extending on to the upper eyelids bilaterally.  - Eczematous dermatitis on the central abdomen: 7 cm in size  - waxy stuck on papules on the trunk  - No other lesions of concern on areas examined.       Impression/Plan:  1. Benign findings including: seborrheic keratoses.     Patient reassured of the benign nature of these lesions.    No further intervention required. Patient to report changes.     2. Eczema. Discussed  the pathogenesis of the condition. Emphasized the importance of keeping the skin moisturized to prevent future flares.     Recommended a cream based moisturizer, like CeraVe or Vanicream daily to keep the skin hydrated.    Recommended gentle skin care recommended Dove bar soap    Gentle skin care handout provided.     For the body: prescribed triamcinolone 0.1%  to be used BID on red, itchy, or scaly spots until itch is resolved.     For the face: prescribed protopic 0.1% ointment to be used BID on areas of rash.    Recommended keeping a bandage over the area on the lower back to stop the itch cycle.  Given the thickness of this lesion, it is likely very long standing and will take time to go away.     If does not resolve in a month, patient advised to call our office for follow up .    CC Georgia Burleson PA-C on close of this encounter.  Follow-up prn.       Staff Involved:  Scribe/Staff    Scribe Disclosure  I, Lennie Luo, am serving as a scribe to document services personally performed by Dr. Nanette Alfaro MD, based on data collection and the provider's statements to me.     Provider Disclosure:   The documentation recorded by the scribe accurately reflects the services I personally performed and the decisions made by me.    Nanette Alfaro MD    Department of Dermatology  [facility]: Phone: [phone], Fax:[phone]  [facility]: Phone: [phone], Fax: [phone]

## 2019-06-17 ENCOUNTER — OFFICE VISIT (OUTPATIENT)
Dept: FAMILY MEDICINE | Facility: CLINIC | Age: 64
End: 2019-06-17
Payer: COMMERCIAL

## 2019-06-17 VITALS
BODY MASS INDEX: 24.62 KG/M2 | SYSTOLIC BLOOD PRESSURE: 140 MMHG | WEIGHT: 172 LBS | HEIGHT: 70 IN | TEMPERATURE: 97.9 F | DIASTOLIC BLOOD PRESSURE: 90 MMHG

## 2019-06-17 DIAGNOSIS — M54.42 ACUTE LEFT-SIDED LOW BACK PAIN WITH LEFT-SIDED SCIATICA: Primary | ICD-10-CM

## 2019-06-17 PROCEDURE — 99213 OFFICE O/P EST LOW 20 MIN: CPT | Performed by: FAMILY MEDICINE

## 2019-06-17 RX ORDER — PREDNISONE 20 MG/1
TABLET ORAL
Qty: 20 TABLET | Refills: 0 | Status: SHIPPED | OUTPATIENT
Start: 2019-06-17 | End: 2019-07-17

## 2019-06-17 ASSESSMENT — MIFFLIN-ST. JEOR: SCORE: 1586.44

## 2019-06-17 NOTE — PROGRESS NOTES
Subjective     Shelia Ortiz is a 64 year old male who presents to clinic today for the following health issues:    HPI     Back Pain       Duration: 2 months        Specific cause: January 2019 when shoveling snow he fell onto buttock    Description:   Location of pain: low back left  Character of pain: consistent sharp  Pain radiation:radiates into the left leg  New numbness or weakness in legs, not attributed to pain:  no     Intensity: Currently 9/10    History:   Pain interferes with job: No  History of back problems: no prior back problems  Any previous MRI or X-rays: None  Sees a specialist for back pain:  No  Therapies tried without relief:     Alleviating factors:   Improved by: standing up alleviates pain, tries to massage      Precipitating factors:  Worsened by: Lying Flat          Accompanying Signs & Symptoms:  Risk of Fracture:  None  Risk of Cauda Equina:  None  Risk of Infection:  None  Risk of Cancer:  None  Risk of Ankylosing Spondylitis:  Onset at age <35, male, AND morning back stiffness. no     Patient took a fall last winter in his drive way. After a while, his pain subsided and thought that he was healed. Around early April, he started feeling sharp pains in his lower back, which radiate down to his lower thigh. He can feel the pain whenever he stands up from a sitting position, and tells us that it is more prominent in his left leg. He also reports that the pain is worse when having a stool, or coughing. Patient has been doing figure four stretches at home, with some relief.       BP Readings from Last 3 Encounters:   06/17/19 140/90   01/29/19 (!) 133/94   01/18/19 139/79    Wt Readings from Last 3 Encounters:   06/17/19 78 kg (172 lb)   01/18/19 79.7 kg (175 lb 11.2 oz)   05/30/17 78.3 kg (172 lb 11.2 oz)                Reviewed and updated as needed this visit by Provider  This document serves as a record of the services and decisions personally performed by Leida Lewis DO. It was created  "on her behalf by Brielle Lynn, a trained medical scribe. The creation of this document is based on the provider's statements to the medical scribe. Brielle Lynn June 17, 2019 12:04 PM           Review of Systems   ROS COMP: Constitutional, HEENT, cardiovascular, pulmonary, gi and gu systems are negative, except as otherwise noted.      Objective    /90   Temp 97.9  F (36.6  C) (Oral)   Ht 1.778 m (5' 10\")   Wt 78 kg (172 lb)   BMI 24.68 kg/m    Body mass index is 24.68 kg/m .  Physical Exam   GENERAL: healthy, alert and no distress  RESP: lungs clear to auscultation - no rales, rhonchi or wheezes  CV: regular rate and rhythm, normal S1 S2, no S3 or S4, no murmur, click or rub, no peripheral edema and peripheral pulses strong  ABDOMEN: soft, non-tender, no hepatosplenomegaly, no masses and bowel sounds normal  MS: normal ROM in knees and hips, negative straight leg raise, non-tender piriformis, no edema  PSYCH: mentation appears normal, affect normal/bright        Diagnostic Test Results:  Labs reviewed in Epic        Assessment & Plan       ICD-10-CM    1. Acute left-sided low back pain with left-sided sciatica M54.42 predniSONE (DELTASONE) 20 MG tablet      Patient will start on a prednisone taper for 2 weeks to treat the inflamed nerve. He is advised to continue with figure four stretches, and follow up if symptoms fail to improve.     Patient Instructions     Continue with figure four stretches    Start on prednisone prescription      Return if symptoms worsen or fail to improve.     The information in this document, created by the medical scribe for me, accurately reflects the services I personally performed and the decisions made by me. I have reviewed and approved this document for accuracy.      Leida Lewis, DO  [facility]    "

## 2019-07-17 ENCOUNTER — ANCILLARY PROCEDURE (OUTPATIENT)
Dept: GENERAL RADIOLOGY | Facility: CLINIC | Age: 64
End: 2019-07-17
Attending: FAMILY MEDICINE
Payer: COMMERCIAL

## 2019-07-17 ENCOUNTER — OFFICE VISIT (OUTPATIENT)
Dept: FAMILY MEDICINE | Facility: CLINIC | Age: 64
End: 2019-07-17
Payer: COMMERCIAL

## 2019-07-17 VITALS
HEIGHT: 70 IN | BODY MASS INDEX: 24.77 KG/M2 | WEIGHT: 173 LBS | DIASTOLIC BLOOD PRESSURE: 98 MMHG | SYSTOLIC BLOOD PRESSURE: 128 MMHG | HEART RATE: 72 BPM | TEMPERATURE: 98.3 F

## 2019-07-17 DIAGNOSIS — G89.29 CHRONIC LEFT-SIDED LOW BACK PAIN WITH LEFT-SIDED SCIATICA: Primary | ICD-10-CM

## 2019-07-17 DIAGNOSIS — G89.29 CHRONIC LEFT-SIDED LOW BACK PAIN WITH LEFT-SIDED SCIATICA: ICD-10-CM

## 2019-07-17 DIAGNOSIS — M54.42 CHRONIC LEFT-SIDED LOW BACK PAIN WITH LEFT-SIDED SCIATICA: Primary | ICD-10-CM

## 2019-07-17 DIAGNOSIS — M54.42 CHRONIC LEFT-SIDED LOW BACK PAIN WITH LEFT-SIDED SCIATICA: ICD-10-CM

## 2019-07-17 PROCEDURE — 72100 X-RAY EXAM L-S SPINE 2/3 VWS: CPT | Mod: FY

## 2019-07-17 PROCEDURE — 99213 OFFICE O/P EST LOW 20 MIN: CPT | Performed by: FAMILY MEDICINE

## 2019-07-17 RX ORDER — DICLOFENAC SODIUM 75 MG/1
75 TABLET, DELAYED RELEASE ORAL 2 TIMES DAILY
Qty: 40 TABLET | Refills: 1 | Status: SHIPPED | OUTPATIENT
Start: 2019-07-17 | End: 2019-08-29

## 2019-07-17 ASSESSMENT — MIFFLIN-ST. JEOR: SCORE: 1590.97

## 2019-07-17 NOTE — PROGRESS NOTES
Subjective     Shelia Ortiz is a 64 year old male who presents to clinic today for the following health issues:    HPI     Patient presenting to the clinic for a back pain follow up. He reports that he took a fall in is drive way back in January. No prior injury to incident. A couple of months later, he started to feel an unusual sensation in his left leg which evolved into more pain. He denies any numbness. Pain is improved with activity, and worsened with pressure when coughing or having a stool. He cannot sit with his back slouched. Patient denies any issues with climbing stairs or feeling weakness.     He was started on a two week prednisone taper 4 weeks ago. Significant relief with medication, however, the pain has returned since completing taper. Patient is currently taking ibuprofen 400 mg at night for management. 7/10 pain scale today.     Patient Active Problem List   Diagnosis     Hyperlipidemia LDL goal <130     Atopic dermatitis     Advanced directives, counseling/discussion     Elevated blood pressure reading without diagnosis of hypertension     Past Surgical History:   Procedure Laterality Date     COLONOSCOPY  10/24/2013    Procedure: COLONOSCOPY;  Tubular adenoma of colon  pkt sent 10/7  rx sent  ;  Surgeon: Lenin Lopez MD;  Location: [facility] OR     COLONOSCOPY WITH CO2 INSUFFLATION N/A 2019    Procedure: COLONOSCOPY WITH CO2 INSUFFLATION;  Surgeon: Kaleb Morelos MD;  Location: [facility] OR     NO HISTORY OF SURGERY         Social History     Tobacco Use     Smoking status: Never Smoker     Smokeless tobacco: Never Used   Substance Use Topics     Alcohol use: No     Family History   Problem Relation Age of Onset     Hypertension Mother          age 86-stroke      Diabetes Mother         developed in her 70s, now age 77 or 78     Cerebrovascular Disease Mother      Hypertension Father      Cancer - colorectal Father         age 74 or 75     Hypertension Brother      Asthma No family  "hx of      C.A.D. No family hx of      Breast Cancer No family hx of      Prostate Cancer No family hx of      Thyroid Disease No family hx of      Osteoporosis No family hx of          Current Outpatient Medications   Medication Sig Dispense Refill     calcium carbonate-vitamin D (OS-JACQUE) 500-400 MG-UNIT tablet Take 1 tablet by mouth daily       diclofenac (VOLTAREN) 75 MG EC tablet Take 1 tablet (75 mg) by mouth 2 times daily 40 tablet 1     Omega-3 Fatty Acids (FISH OIL) 1200 MG capsule Take 1 capsule by mouth daily.       vitamin D3 (CHOLECALCIFEROL) 2000 units tablet Take 1 tablet by mouth daily       BP Readings from Last 3 Encounters:   07/17/19 (!) 128/98   06/17/19 140/90   01/29/19 (!) 133/94    Wt Readings from Last 3 Encounters:   07/17/19 78.5 kg (173 lb)   06/17/19 78 kg (172 lb)   01/18/19 79.7 kg (175 lb 11.2 oz)          Reviewed and updated as needed this visit by Provider  This document serves as a record of the services and decisions personally performed and made by Charlie Becerril MD. It was created on his behalf by Brielle Lynn, a trained medical scribe. The creation of this document is based the provider's statements to the medical scribe.  Brielle Lynn, July 17, 2019 9:40 AM            Review of Systems   ROS COMP: Constitutional, HEENT, cardiovascular, pulmonary, gi and gu systems are negative, except as otherwise noted.      Objective    BP (!) 128/98 (Cuff Size: Adult Regular)   Pulse 72   Temp 98.3  F (36.8  C) (Oral)   Ht 1.778 m (5' 10\")   Wt 78.5 kg (173 lb)   BMI 24.82 kg/m    Body mass index is 24.82 kg/m .  Physical Exam   GENERAL: healthy, alert and no distress  NECK: no adenopathy, no asymmetry, masses, or scars and thyroid normal to palpation  RESP: lungs clear to auscultation - no rales, rhonchi or wheezes  CV: regular rate and rhythm, normal S1 S2, no S3 or S4, no murmur, click or rub, no peripheral edema and peripheral pulses strong  ABDOMEN: soft, nontender, no " hepatosplenomegaly, no masses and bowel sounds normal  MS: tightness of hamstrings with piriformis stretch  Comprehensive back pain exam:  Tenderness of L2-L3, Range of motion not limited by pain, Lower extremity strength functional and equal on both sides and Straight leg raise negative bilaterally  PSYCH: mentation appears normal, affect normal/bright      Diagnostic Test Results:  Labs reviewed in Epic      X-ray lumbar spine- mild arthritic changes. No compression fx  Assessment & Plan     (M54.42,  G89.29) Chronic left-sided low back pain with left-sided sciatica  (primary encounter diagnosis)  Comment:   Plan: XR Lumbar Spine 2/3 Views, [facility], AND         CHIROPRACTIC REFERRAL, diclofenac (VOLTAREN) 75        MG EC tablet        Patient to start with voltaren 75 mg and follow up in 1 month for recheck. He is also advised to connect with [facility] to schedule for PT.         Patient Instructions         1. Take 1 tablet of volatren in the morning, and 1 at night.    2. Follow up with physical therapy:   [facility]- [phone]    3. Consider putting a pillow between or under your legs depending on sleeping position.     4. Continue with figure four stretches    Orders Placed This Encounter     XR Lumbar Spine 2/3 Views     Standing Status:   Future     Number of Occurrences:   1     Standing Expiration Date:   7/17/2020     Order Specific Question:   Priority     Answer:   Routine     Order Specific Question:   Is this exam to be performed at [facility]?     Answer:   No     [facility], AND CHIROPRACTIC REFERRAL     Standing Status:   Future     Standing Expiration Date:   7/17/2020     Referral Priority:   Routine     Referral Type:   [facility]     Number of Visits Requested:   1     diclofenac (VOLTAREN) 75 MG EC tablet     Sig: Take 1 tablet (75 mg) by mouth 2 times daily     Dispense:  40 tablet     Refill:  1           Return in about 1  month (around 8/17/2019) for Medication Follow-up.    The information in this document, created by the medical scribe for me, accurately reflects the services I personally performed and the decisions made by me. I have reviewed and approved this document for accuracy.     Charlie Becerril MD, MD  [facility]

## 2019-07-17 NOTE — PATIENT INSTRUCTIONS
1. Take 1 tablet of volatren in the morning, and 1 at night.    2. Follow up with physical therapy:   [facility]- [phone]    3. Consider putting a pillow between or under your legs depending on sleeping position.     4. Continue with figure four stretches    Orders Placed This Encounter     XR Lumbar Spine 2/3 Views     Standing Status:   Future     Number of Occurrences:   1     Standing Expiration Date:   7/17/2020     Order Specific Question:   Priority     Answer:   Routine     Order Specific Question:   Is this exam to be performed at [facility]?     Answer:   No     [facility], AND CHIROPRACTIC REFERRAL     Standing Status:   Future     Standing Expiration Date:   7/17/2020     Referral Priority:   Routine     Referral Type:   [facility]     Number of Visits Requested:   1     diclofenac (VOLTAREN) 75 MG EC tablet     Sig: Take 1 tablet (75 mg) by mouth 2 times daily     Dispense:  40 tablet     Refill:  1

## 2019-08-24 ENCOUNTER — THERAPY VISIT (OUTPATIENT)
Dept: PHYSICAL THERAPY | Facility: CLINIC | Age: 64
End: 2019-08-24
Attending: FAMILY MEDICINE
Payer: COMMERCIAL

## 2019-08-24 DIAGNOSIS — G89.29 CHRONIC LEFT-SIDED LOW BACK PAIN WITH LEFT-SIDED SCIATICA: ICD-10-CM

## 2019-08-24 DIAGNOSIS — M54.42 CHRONIC LEFT-SIDED LOW BACK PAIN WITH LEFT-SIDED SCIATICA: ICD-10-CM

## 2019-08-24 PROCEDURE — 97110 THERAPEUTIC EXERCISES: CPT | Mod: GP | Performed by: PHYSICAL THERAPIST

## 2019-08-24 PROCEDURE — 97530 THERAPEUTIC ACTIVITIES: CPT | Mod: GP | Performed by: PHYSICAL THERAPIST

## 2019-08-24 PROCEDURE — 97161 PT EVAL LOW COMPLEX 20 MIN: CPT | Mod: GP | Performed by: PHYSICAL THERAPIST

## 2019-08-24 NOTE — PROGRESS NOTES
[facility] Initial Evaluation  Subjective:  The history is provided by the patient. No  was used.   Shelia Ortiz being seen for LBP.   Date of Onset: April 2019. Where condition occurred: at home.Problem occurred: Gradual Insidious onset of LBP for unknow reason  Pain score: ranges 1-4/10. General health as reported by patient is good. Pertinent medical history includes:  None.  Medical allergies: none.  Surgeries include:  None.  Current medications:  Anti-inflammatory.   Primary job tasks include:  Computer work and prolonged sitting.  Pain is described as sharp and is constant. Pain is worse in the A.M.. Since onset symptoms are unchanged. Special tests:  X-ray (mild Degenerative changes ).     Patient is . Restrictions include:  Working in normal job without restrictions.    Barriers include:  None as reported by patient.  Red flags:  None as reported by patient.  Type of problem:  Lumbar   Condition occurred with:  Insidious onset. This is a new condition    Patient reports pain:  Central lumbar spine. Radiates to:  Gluteals left and lower leg left.  Symptoms are exacerbated by sitting (transiton out of bed  painful in AM ) and relieved by activity/movement.                      Objective:  Standing Alignment:    Cervical/Thoracic:  Thoracic kyphosis increased and forward head  Shoulder/UE:  Rounded shoulders, protracted scapula R and protracted scapula L              Gait:    Gait Type:  Normal   Assistive Devices:  None    Non-Weight Bearing:  normal             Flexibility/Screens:   Positive screens:  Lumbar    Lower Extremity:  Decreased left lower extremity flexibility:Hamstrings    Decreased right lower extremity flexibility:  Hamstrings               Lumbar/SI Evaluation  ROM:    AROM Lumbar:   Flexion:        Hands to ankles w/o change in symptoms = 1/10  Ext:                    50% with decrease in symptoms  from 1/10 to 0/10   Side Bend:        Left:      Right:   Rotation:           Left:     Right:   Side Glide:        Left:     Right:         Strength: Fair Core Strength and proprioception   Lumbar Myotomes:  normal            Lumbar DTR's:  not assessed      Cord Signs:  not assessed    Lumbar Dermtomes:  not assessed                Neural Tension/Mobility:    Left side:  SLR and Slump positive.     Lumbar Palpation:  normal      Functional Tests:  not assessed        Lumbar Provocation:      Left negative with:  PROM hip    Right negative with:  PROM hip                                                 General     ROS    Assessment/Plan:    Patient is a 64 year old male with lumbar complaints.    Patient has the following significant findings with corresponding treatment plan.                Diagnosis 1:  Chronic Left LBP with L sciatica (+neural tension)   Pain -  self management, education, directional preference exercise, home program and modalities PRN  Decreased ROM/flexibility - manual therapy, therapeutic exercise and home program  Decreased strength - therapeutic exercise, therapeutic activities and home program  Decreased function - therapeutic activities and home program  Impaired posture - neuro re-education and home program    Therapy Evaluation Codes:   1) History comprised of:   Personal factors that impact the plan of care:      None.    Comorbidity factors that impact the plan of care are:      None.     Medications impacting care: Anti-inflammatory.  2) Examination of Body Systems comprised of:   Body structures and functions that impact the plan of care:      Lumbar spine.   Activity limitations that impact the plan of care are:      Sitting.  3) Clinical presentation characteristics are:   Stable/Uncomplicated.  4) Decision-Making    Low complexity using standardized patient assessment instrument and/or measureable assessment of functional outcome.  Cumulative Therapy Evaluation is: Low complexity.    Previous and current functional  limitations:  (See Goal Flow Sheet for this information)    Short term and Long term goals: (See Goal Flow Sheet for this information)     Communication ability:  Patient appears to be able to clearly communicate and understand verbal and written communication and follow directions correctly.  Treatment Explanation - The following has been discussed with the patient:   RX ordered/plan of care  Anticipated outcomes  Possible risks and side effects  This patient would benefit from PT intervention to resume normal activities.   Rehab potential is good.    Frequency:  1 X week, once daily  Duration:  for 6 weeks  Discharge Plan:  Achieve all LTG.  Independent in home treatment program.  Return to previous functional level by discharge.  Reach maximal therapeutic benefit.    Please refer to the daily flowsheet for treatment today, total treatment time and time spent performing 1:1 timed codes.

## 2019-08-26 ENCOUNTER — OFFICE VISIT (OUTPATIENT)
Dept: FAMILY MEDICINE | Facility: CLINIC | Age: 64
End: 2019-08-26
Payer: COMMERCIAL

## 2019-08-26 VITALS
SYSTOLIC BLOOD PRESSURE: 132 MMHG | HEIGHT: 70 IN | TEMPERATURE: 98.6 F | HEART RATE: 71 BPM | DIASTOLIC BLOOD PRESSURE: 90 MMHG | BODY MASS INDEX: 25.04 KG/M2 | RESPIRATION RATE: 20 BRPM | OXYGEN SATURATION: 98 % | WEIGHT: 174.9 LBS

## 2019-08-26 DIAGNOSIS — G89.29 CHRONIC LEFT-SIDED LOW BACK PAIN WITH LEFT-SIDED SCIATICA: Primary | ICD-10-CM

## 2019-08-26 DIAGNOSIS — M54.42 CHRONIC LEFT-SIDED LOW BACK PAIN WITH LEFT-SIDED SCIATICA: Primary | ICD-10-CM

## 2019-08-26 PROCEDURE — 99214 OFFICE O/P EST MOD 30 MIN: CPT | Performed by: FAMILY MEDICINE

## 2019-08-26 RX ORDER — PREDNISONE 20 MG/1
TABLET ORAL
Qty: 20 TABLET | Refills: 0 | Status: SHIPPED | OUTPATIENT
Start: 2019-08-26 | End: 2019-09-19

## 2019-08-26 ASSESSMENT — PAIN SCALES - GENERAL: PAINLEVEL: MILD PAIN (3)

## 2019-08-26 ASSESSMENT — MIFFLIN-ST. JEOR: SCORE: 1599.59

## 2019-08-26 NOTE — PROGRESS NOTES
Subjective     Shelia Ortiz is a 64 year old male who presents to clinic today for the following health issues:    HPI   Chronic/Recurring Back Pain Follow Up  Fell on ground in Jan - these symptoms started in April.       Where is your back pain located? (Select all that apply) low back center    How would you describe your back pain?  sharp    Where does your back pain spread? the left buttock and the left  thigh    Since your last clinic visit for back pain, how has your pain changed? rapidly worsening    Does your back pain interfere with your job? No    Since your last visit, have you tried any new treatment? Yes -  Physical Therapy  4-5 months ago with symptoms.  Was doing PHYSICAL THERAPY but not improved- using voltaren consistently but stopped the voltaren for a few days - worse without.  Restarted medication.  Not as much improvement with medication now as 6 weeks ago.  Worse in AM.  Radiating pain in AM.  Hard to drive this AM.    Took steroid course for 12 days back in June with great results that lasted for about 1-2 weeks after stopping medication.        How many servings of fruits and vegetables do you eat daily?  2-3    On average, how many sweetened beverages do you drink each day (soda, juice, sweet tea, etc)?   4    How many days per week do you miss taking your medication? 0            BP Readings from Last 3 Encounters:   08/26/19 (!) 132/90   07/17/19 (!) 128/98   06/17/19 140/90    Wt Readings from Last 3 Encounters:   08/26/19 79.3 kg (174 lb 14.4 oz)   07/17/19 78.5 kg (173 lb)   06/17/19 78 kg (172 lb)                      Reviewed and updated as needed this visit by Provider  Tobacco  Allergies  Meds  Med Hx  Surg Hx  Fam Hx  Soc Hx        Review of Systems   ROS COMP: Constitutional, HEENT, cardiovascular, pulmonary, gi and gu systems are negative, except as otherwise noted.      Objective    BP (!) 132/90 (BP Location: Right arm, Patient Position: Sitting, Cuff Size: Adult Regular)  "  Pulse 71   Temp 98.6  F (37  C) (Oral)   Resp 20   Ht 1.778 m (5' 10\")   Wt 79.3 kg (174 lb 14.4 oz)   SpO2 98%   BMI 25.10 kg/m    Body mass index is 25.1 kg/m .  Physical Exam   GENERAL: alert and no distress  NECK: no adenopathy, no asymmetry, masses, or scars and thyroid normal to palpation  RESP: lungs clear to auscultation - no rales, rhonchi or wheezes  CV: regular rate and rhythm, normal S1 S2, no S3 or S4, no murmur, click or rub, no peripheral edema and peripheral pulses strong  ABDOMEN: soft, nontender, no hepatosplenomegaly, no masses and bowel sounds normal  Comprehensive back pain exam:  No tenderness, Range of motion not limited by pain, Lower extremity strength functional and equal on both sides, Lower extremity reflexes within normal limits bilaterally, Lower extremity sensation normal and equal on both sides and Straight leg positive on  left, indicating possible ipsilateral radiculopathy  PSYCH: mentation appears normal, affect normal/bright    Diagnostic Test Results:  Labs reviewed in Epic        Assessment & Plan     1. Chronic left-sided low back pain with left-sided sciatica  MRI recommended due to persistent course of symptoms.  Trial repeat prednisone course planned.  Do not take voltaren while on pred.    - predniSONE (DELTASONE) 20 MG tablet; Take 3 tabs by mouth daily x 3 days, then 2 tabs daily x 3 days, then 1 tab daily x 3 days, then 1/2 tab daily x 3 days.  Dispense: 20 tablet; Refill: 0  - MR Lumbar Spine w/o & w Contrast; Future     BMI:   Estimated body mass index is 25.1 kg/m  as calculated from the following:    Height as of this encounter: 1.778 m (5' 10\").    Weight as of this encounter: 79.3 kg (174 lb 14.4 oz).   Weight management plan: Discussed healthy diet and exercise guidelines        Patient Instructions   Continue PHYSICAL THERAPY.    Discontinue the Voltaren    Begin Prednisone for weaning course.    MRI recommended for additional evaluation.   You can call " [phone] to schedule this at the [facility] in [place] (formerly the [facility]).          Return in about 4 weeks (around 9/23/2019) for BP Recheck, as needed for persistent symptoms.    Jannet Mijares MD  [facility]

## 2019-08-26 NOTE — PATIENT INSTRUCTIONS
Continue PHYSICAL THERAPY.    Discontinue the Voltaren    Begin Prednisone for weaning course.    MRI recommended for additional evaluation.   You can call [phone] to schedule this at the [facility] in [place] (formerly the [facility]).

## 2019-08-29 ENCOUNTER — ANCILLARY PROCEDURE (OUTPATIENT)
Dept: MRI IMAGING | Facility: CLINIC | Age: 64
End: 2019-08-29
Attending: FAMILY MEDICINE
Payer: COMMERCIAL

## 2019-08-29 ENCOUNTER — OFFICE VISIT (OUTPATIENT)
Dept: FAMILY MEDICINE | Facility: CLINIC | Age: 64
End: 2019-08-29
Payer: COMMERCIAL

## 2019-08-29 VITALS
HEART RATE: 79 BPM | WEIGHT: 175.9 LBS | OXYGEN SATURATION: 98 % | SYSTOLIC BLOOD PRESSURE: 162 MMHG | DIASTOLIC BLOOD PRESSURE: 98 MMHG | BODY MASS INDEX: 25.18 KG/M2 | HEIGHT: 70 IN | TEMPERATURE: 98.2 F

## 2019-08-29 DIAGNOSIS — M54.42 CHRONIC LEFT-SIDED LOW BACK PAIN WITH LEFT-SIDED SCIATICA: ICD-10-CM

## 2019-08-29 DIAGNOSIS — G89.29 CHRONIC LEFT-SIDED LOW BACK PAIN WITH LEFT-SIDED SCIATICA: ICD-10-CM

## 2019-08-29 DIAGNOSIS — M54.42 CHRONIC LEFT-SIDED LOW BACK PAIN WITH LEFT-SIDED SCIATICA: Primary | ICD-10-CM

## 2019-08-29 DIAGNOSIS — G89.29 CHRONIC LEFT-SIDED LOW BACK PAIN WITH LEFT-SIDED SCIATICA: Primary | ICD-10-CM

## 2019-08-29 DIAGNOSIS — R93.7 ABNORMAL MRI, LUMBAR SPINE: Primary | ICD-10-CM

## 2019-08-29 LAB — RADIOLOGIST FLAGS: ABNORMAL

## 2019-08-29 PROCEDURE — 99214 OFFICE O/P EST MOD 30 MIN: CPT | Performed by: FAMILY MEDICINE

## 2019-08-29 PROCEDURE — A9585 GADOBUTROL INJECTION: HCPCS | Mod: JW | Performed by: FAMILY MEDICINE

## 2019-08-29 PROCEDURE — 72158 MRI LUMBAR SPINE W/O & W/DYE: CPT | Performed by: RADIOLOGY

## 2019-08-29 RX ORDER — DICLOFENAC SODIUM 75 MG/1
75 TABLET, DELAYED RELEASE ORAL 2 TIMES DAILY
Qty: 40 TABLET | Refills: 1 | Status: ON HOLD | OUTPATIENT
Start: 2019-08-29 | End: 2019-10-06

## 2019-08-29 RX ORDER — GADOBUTROL 604.72 MG/ML
10 INJECTION INTRAVENOUS ONCE
Status: COMPLETED | OUTPATIENT
Start: 2019-08-29 | End: 2019-08-29

## 2019-08-29 RX ADMIN — GADOBUTROL 8 ML: 604.72 INJECTION INTRAVENOUS at 11:19

## 2019-08-29 ASSESSMENT — MIFFLIN-ST. JEOR: SCORE: 1604.13

## 2019-08-29 ASSESSMENT — PAIN SCALES - GENERAL: PAINLEVEL: NO PAIN (0)

## 2019-08-29 NOTE — PATIENT INSTRUCTIONS
Referral for Neurology.   You can call [phone] to schedule this at the [facility] (formerly the [facility]).    Orders for MRI of Brain, neck and upper back recommended.   You can call [phone] to schedule this at the [facility] (formerly the [facility]).    Labs recommended.

## 2019-08-29 NOTE — PROGRESS NOTES
"Subjective     Shelia Ortiz is a 64 year old male who presents to clinic today for the following health issues:    HPI     - Pt is here for results of MRI completed today - reviewed results.    Mass in the L1-2 area, superior to this there is a mass in spinal canal and at level of S2 neural foramen.  Uncertain cause but concerning for metastatic source.          BP Readings from Last 3 Encounters:   08/29/19 (!) 162/98   08/26/19 (!) 132/90   07/17/19 (!) 128/98    Wt Readings from Last 3 Encounters:   08/29/19 79.8 kg (175 lb 14.4 oz)   08/26/19 79.3 kg (174 lb 14.4 oz)   07/17/19 78.5 kg (173 lb)                    Reviewed and updated as needed this visit by Provider  Tobacco  Allergies  Meds  Problems  Med Hx  Surg Hx  Fam Hx  Soc Hx          Review of Systems   ROS COMP: CONSTITUTIONAL: NEGATIVE for fever, chills, change in weight  INTEGUMENTARY/SKIN: NEGATIVE for worrisome rashes, moles or lesions  EYES: NEGATIVE for vision changes or irritation  ENT/MOUTH: NEGATIVE for ear, mouth and throat problems  RESP: NEGATIVE for significant cough or SOB  BREAST: NEGATIVE for masses, tenderness or discharge  CV: NEGATIVE for chest pain, palpitations or peripheral edema  GI: NEGATIVE for nausea, abdominal pain, heartburn, or change in bowel habits  : NEGATIVE for frequency, dysuria, or hematuria  MUSCULOSKELETAL:low back pain with occasional radiation to the left thigh/leg.  Worse with cough, or bearing down.    NEURO: NEGATIVE for weakness, dizziness or paresthesias  ENDOCRINE: NEGATIVE for temperature intolerance, skin/hair changes  HEME: NEGATIVE for bleeding problems  PSYCHIATRIC: NEGATIVE for changes in mood or affect      Objective    BP (!) 162/98 (BP Location: Right arm, Patient Position: Chair, Cuff Size: Adult Regular)   Pulse 79   Temp 98.2  F (36.8  C) (Oral)   Ht 1.778 m (5' 10\")   Wt 79.8 kg (175 lb 14.4 oz)   SpO2 98%   BMI 25.24 kg/m     Body mass index is 25.24 kg/m .  Physical Exam " "  GENERAL: healthy, alert and no distress  EYES: Eyes grossly normal to inspection, PERRL and conjunctivae and sclerae normal  HENT: ear canals and TM's normal, nose and mouth without ulcers or lesions  NECK: no adenopathy, no asymmetry, masses, or scars and thyroid normal to palpation  RESP: lungs clear to auscultation - no rales, rhonchi or wheezes  CV: regular rate and rhythm, normal S1 S2, no S3 or S4, no murmur, click or rub, no peripheral edema and peripheral pulses strong  ABDOMEN: soft, nontender, no hepatosplenomegaly, no masses and bowel sounds normal  MS: FROM all extremities.  No swelling or erythema.  No laxity.  NEURO: Normal strength and tone, sensory exam grossly normal, mentation intact, speech normal, cranial nerves 2-12 intact, DTR's normal and symmetric throughout, gait normal including heel/toe/tandem walking and Romberg normal  Comprehensive back pain exam:  Tenderness of paravertebral muscles lumbar spine. , Range of motion not limited by pain, Lower extremity strength functional and equal on both sides, Lower extremity reflexes within normal limits bilaterally, Lower extremity sensation normal and equal on both sides and Straight leg positive on  left, indicating possible ipsilateral radiculopathy  PSYCH: mentation appears normal, affect normal/bright, anxious about results of MRI, judgement and insight intact and appearance well groomed  LYMPH: no cervical, supraclavicular, axillary, or inguinal adenopathy    Diagnostic Test Results:  Labs reviewed in Epic  Results for orders placed or performed in visit on 08/29/19   MR Lumbar Spine w/o & w Contrast   Result Value Ref Range    Radiologist flags (Urgent)      Enhancing lesions in the lumbar spine concerning for    Narrative    TEMPORARY 8/29/2019 2:46 PM    Provided History: Back pain.    Per the EHR note 7/7/19: \"a fall in drive way back in January. No  prior injury to incident. A couple of months later, he started to feel  an unusual " "sensation in his left leg which evolved into more pain.\"    Comparison: None available    Technique: Sagittal T1-weighted and T2-weighted and axial T2-weighted  images of the lumbar spine were obtained without intravenous contrast.  Post intravenous contrast using gadolinium axial and sagittal  T1-weighted images were obtained with fat saturation.    Findings: Transitional anatomy with lumbarized S1. In the spinal  canal, only at the level L1-2, there is intradural, extramedullary  well-circumscribed, T2 hyperintense, peripherally enhancing mass that  measures approximately 2.6 x 1.1 cm and displaces the rootlets of the  cauda equina. Additionally, there is a enhancing 6 mm mass immediately  superior to this lesion within the spinal canal. Additionally, at the  level of the right S2 neural foramen and lateral to the thecal sac,  there is a 3.5 x 0.7 cm enhancing mass concerning for additional  disease. Multiple vertebral body hemangiomas are also noted.     On a level by level basis:    L1-2: No significant spinal canal or foraminal narrowing.    L2-3: No significant spinal canal or foraminal narrowing.    L3-4: No significant spinal canal or foraminal narrowing.    L4-5: No significant spinal canal or foraminal narrowing.    L5-S1: No significant spinal canal or foraminal narrowing.      Impression    Impression:   1. There is a 2.6 x 1.1 cm intradural, extra medullary, peripherally  enhancing cystic mass approximately at the level of L1-2. There is an  additional enhancing mass at the level of the right S2 neural foramen  and a subcentimeter enhancing nodule immediately superior to the index  mass. Given this appearance, this is concerning for high-grade CNS  tumor with drop metastases versus metastatic disease from an unknown  primary. Recommend additional neurologic imaging workup and/or  whole-body metastatic disease workup.  2. Transitional anatomy with partially lumbarized S1.  3. Multiple benign vertebral " "body hemangiomas.    [Urgent Result: Enhancing lesions in the lumbar spine concerning for  metastatic disease]    Finding was identified on 8/29/2019 2:46 PM.     Dr. Mijares was contacted by Dr. Shah at 8/29/2019 4:02 PM and  verbalized understanding of the urgent finding.      I have personally reviewed the examination and initial interpretation  and I agree with the findings.    MANNY CAR MD           Assessment & Plan     1. Abnormal MRI, lumbar spine  Additional lab evaluation planned. History not suggestive of particular primary source if this is metastatic disease.  MRI brain/cervical/thoracic spine area planned. Referral to neuro for consideration of LP.  Has not taken the prednisone yet - pain less severe now.  Will wait on this until the scans completed.    - **UA reflex to Microscopic FUTURE anytime; Future  - **Comprehensive metabolic panel FUTURE anytime; Future  - **CBC with platelets FUTURE anytime; Future  - MR Brain w/o & w Contrast; Future  - MR Cervical Spine w/o Contrast; Future  - MR Thoracic Spine w/o & w Contrast; Future  - NEUROLOGY ADULT REFERRAL    2. Chronic left-sided low back pain with left-sided sciatica  Refill for prn use.  - diclofenac (VOLTAREN) 75 MG EC tablet; Take 1 tablet (75 mg) by mouth 2 times daily  Dispense: 40 tablet; Refill: 1     BMI:   Estimated body mass index is 25.24 kg/m  as calculated from the following:    Height as of this encounter: 1.778 m (5' 10\").    Weight as of this encounter: 79.8 kg (175 lb 14.4 oz).   Weight management plan: Discussed healthy diet and exercise guidelines        Patient Instructions   Referral for Neurology.   You can call [phone] to schedule this at the [facility] (formerly the [facility]).    Orders for MRI of Brain, neck and upper back recommended.   You can call [phone] to schedule this at the [facility] (formerly the [facility]).    Labs recommended.       Return in about 2 weeks (around 9/12/2019).    Jannet Mijares MD  [facility]

## 2019-08-30 ENCOUNTER — MYC MEDICAL ADVICE (OUTPATIENT)
Dept: FAMILY MEDICINE | Facility: CLINIC | Age: 64
End: 2019-08-30

## 2019-09-03 ENCOUNTER — HOSPITAL ENCOUNTER (OUTPATIENT)
Dept: MRI IMAGING | Facility: CLINIC | Age: 64
End: 2019-09-03
Attending: FAMILY MEDICINE
Payer: COMMERCIAL

## 2019-09-03 ENCOUNTER — MYC MEDICAL ADVICE (OUTPATIENT)
Dept: FAMILY MEDICINE | Facility: CLINIC | Age: 64
End: 2019-09-03

## 2019-09-03 ENCOUNTER — HOSPITAL ENCOUNTER (OUTPATIENT)
Dept: MRI IMAGING | Facility: CLINIC | Age: 64
Discharge: HOME | End: 2019-09-03
Attending: FAMILY MEDICINE | Admitting: FAMILY MEDICINE
Payer: COMMERCIAL

## 2019-09-03 DIAGNOSIS — R93.7 ABNORMAL MRI, LUMBAR SPINE: ICD-10-CM

## 2019-09-03 LAB
ALBUMIN SERPL-MCNC: 4.1 G/DL (ref 3.4–5)
ALBUMIN UR-MCNC: NEGATIVE MG/DL
ALP SERPL-CCNC: 69 U/L (ref 40–150)
ALT SERPL W P-5'-P-CCNC: 45 U/L (ref 0–70)
ANION GAP SERPL CALCULATED.3IONS-SCNC: 6 MMOL/L (ref 3–14)
APPEARANCE UR: CLEAR
AST SERPL W P-5'-P-CCNC: 22 U/L (ref 0–45)
BACTERIA #/AREA URNS HPF: ABNORMAL /HPF
BILIRUB SERPL-MCNC: 0.8 MG/DL (ref 0.2–1.3)
BILIRUB UR QL STRIP: NEGATIVE
BUN SERPL-MCNC: 13 MG/DL (ref 7–30)
CALCIUM SERPL-MCNC: 8.7 MG/DL (ref 8.5–10.1)
CHLORIDE SERPL-SCNC: 104 MMOL/L (ref 94–109)
CO2 SERPL-SCNC: 28 MMOL/L (ref 20–32)
COLOR UR AUTO: YELLOW
CREAT SERPL-MCNC: 1.01 MG/DL (ref 0.66–1.25)
ERYTHROCYTE [DISTWIDTH] IN BLOOD BY AUTOMATED COUNT: 13.1 % (ref 10–15)
GFR SERPL CREATININE-BSD FRML MDRD: 79 ML/MIN/{1.73_M2}
GLUCOSE SERPL-MCNC: 148 MG/DL (ref 70–99)
GLUCOSE UR STRIP-MCNC: NEGATIVE MG/DL
HCT VFR BLD AUTO: 40.7 % (ref 40–53)
HGB BLD-MCNC: 13.7 G/DL (ref 13.3–17.7)
HGB UR QL STRIP: ABNORMAL
KETONES UR STRIP-MCNC: NEGATIVE MG/DL
LEUKOCYTE ESTERASE UR QL STRIP: NEGATIVE
MCH RBC QN AUTO: 30.6 PG (ref 26.5–33)
MCHC RBC AUTO-ENTMCNC: 33.7 G/DL (ref 31.5–36.5)
MCV RBC AUTO: 91 FL (ref 78–100)
MUCOUS THREADS #/AREA URNS LPF: PRESENT /LPF
NITRATE UR QL: NEGATIVE
NON-SQ EPI CELLS #/AREA URNS LPF: ABNORMAL /LPF
PH UR STRIP: 6 PH (ref 5–7)
PLATELET # BLD AUTO: 204 10E9/L (ref 150–450)
POTASSIUM SERPL-SCNC: 3.2 MMOL/L (ref 3.4–5.3)
PROT SERPL-MCNC: 7 G/DL (ref 6.8–8.8)
RBC # BLD AUTO: 4.48 10E12/L (ref 4.4–5.9)
RBC #/AREA URNS AUTO: ABNORMAL /HPF
SODIUM SERPL-SCNC: 138 MMOL/L (ref 133–144)
SOURCE: ABNORMAL
SP GR UR STRIP: 1.01 (ref 1–1.03)
UROBILINOGEN UR STRIP-ACNC: 0.2 EU/DL (ref 0.2–1)
WBC # BLD AUTO: 6.6 10E9/L (ref 4–11)
WBC #/AREA URNS AUTO: ABNORMAL /HPF

## 2019-09-03 PROCEDURE — 72157 MRI CHEST SPINE W/O & W/DYE: CPT

## 2019-09-03 PROCEDURE — 80053 COMPREHEN METABOLIC PANEL: CPT | Performed by: FAMILY MEDICINE

## 2019-09-03 PROCEDURE — 72156 MRI NECK SPINE W/O & W/DYE: CPT

## 2019-09-03 PROCEDURE — 70553 MRI BRAIN STEM W/O & W/DYE: CPT

## 2019-09-03 PROCEDURE — 85027 COMPLETE CBC AUTOMATED: CPT | Performed by: FAMILY MEDICINE

## 2019-09-03 PROCEDURE — 36415 COLL VENOUS BLD VENIPUNCTURE: CPT | Performed by: FAMILY MEDICINE

## 2019-09-03 PROCEDURE — 25500064 ZZH RX 255 OP 636: Performed by: FAMILY MEDICINE

## 2019-09-03 PROCEDURE — 81001 URINALYSIS AUTO W/SCOPE: CPT | Performed by: FAMILY MEDICINE

## 2019-09-03 PROCEDURE — A9585 GADOBUTROL INJECTION: HCPCS | Performed by: FAMILY MEDICINE

## 2019-09-03 RX ORDER — GADOBUTROL 604.72 MG/ML
10 INJECTION INTRAVENOUS ONCE
Status: COMPLETED | OUTPATIENT
Start: 2019-09-03 | End: 2019-09-03

## 2019-09-03 RX ADMIN — GADOBUTROL 8 ML: 604.72 INJECTION INTRAVENOUS at 17:23

## 2019-09-04 ENCOUNTER — TELEPHONE (OUTPATIENT)
Dept: FAMILY MEDICINE | Facility: CLINIC | Age: 64
End: 2019-09-04

## 2019-09-04 ENCOUNTER — MYC MEDICAL ADVICE (OUTPATIENT)
Dept: FAMILY MEDICINE | Facility: CLINIC | Age: 64
End: 2019-09-04

## 2019-09-04 ENCOUNTER — TELEPHONE (OUTPATIENT)
Dept: NEUROLOGY | Facility: CLINIC | Age: 64
End: 2019-09-04

## 2019-09-04 DIAGNOSIS — M89.8X8 MASS OF SPINE: ICD-10-CM

## 2019-09-04 DIAGNOSIS — R93.7 ABNORMAL MRI, LUMBAR SPINE: Primary | ICD-10-CM

## 2019-09-04 NOTE — TELEPHONE ENCOUNTER
Floberg, John Warren, MD Severin-Brown, Darla, LPN   Caller: Unspecified (Today,  2:33 PM)             Pt needs to see Dr. Mirela Bateman, neuro-oncology and neurosurgery. jf        Message sent to referring provider Dr. Mijares.      Saumya Rabago LPN

## 2019-09-04 NOTE — TELEPHONE ENCOUNTER
[facility] Call Center    Phone Message    May a detailed message be left on voicemail: yes    Reason for Call: Other: Pt scheduled with neurology via a referral received with dx of abnormal MRI. Per protocols, writer sending message to notify team. Pt states he wanted to schedule, but may look for a sooner appt.     Action Taken: Message routed to:  Adult Clinics: Neurology p [phone]

## 2019-09-04 NOTE — TELEPHONE ENCOUNTER
Reason for Call:  Other Referral    Detailed comments[facility] to [facility] but Neurologist on staff  recommends  t that Pt should be seen sooner by a Neurooncologist Dr. Mirela Bateman and a Neurosurgeon Dr. Ki Tidwell     Phone Number Patient can be reached at: Other phone number:  [phone]    Best Time: anytime    Can we leave a detailed message on this number? YES    Call taken on 9/4/2019 at 3:50 PM by Lenin López

## 2019-09-05 NOTE — TELEPHONE ENCOUNTER
ONCOLOGY INTAKE: Records Information      APPT INFORMATION:  Referring provider:  Jannet Mijares MD  Referring provider s clinic:  [facility]  Reason for visit/diagnosis:  Abnormal MRI, Lumbar Spine  Has patient been notified of appointment date and time?: Yes    RECORDS INFORMATION:  Were the records received with the referral (via [employer])? No, Internal    Has patient been seen for any external appt for this diagnosis? No    If yes, where? NA    Has patient had any imaging or procedures outside of [facility] for this condition? No      If Yes, where? NA    ADDITIONAL INFORMATION:

## 2019-09-05 NOTE — TELEPHONE ENCOUNTER
IB Message to Camila SEXTON to verify if Dr. Bateman is the appropriate provider to see this PT or if he should be rescheduled.

## 2019-09-09 ENCOUNTER — HOSPITAL ENCOUNTER (INPATIENT)
Facility: CLINIC | Age: 64
Setting detail: SURGERY ADMIT
End: 2019-09-09
Attending: NEUROLOGICAL SURGERY | Admitting: NEUROLOGICAL SURGERY
Payer: COMMERCIAL

## 2019-09-09 ENCOUNTER — OFFICE VISIT (OUTPATIENT)
Dept: NEUROSURGERY | Facility: CLINIC | Age: 64
End: 2019-09-09
Attending: NEUROLOGICAL SURGERY
Payer: COMMERCIAL

## 2019-09-09 ENCOUNTER — PRE VISIT (OUTPATIENT)
Dept: ONCOLOGY | Facility: CLINIC | Age: 64
End: 2019-09-09

## 2019-09-09 VITALS
TEMPERATURE: 97.9 F | WEIGHT: 171 LBS | HEART RATE: 75 BPM | SYSTOLIC BLOOD PRESSURE: 146 MMHG | BODY MASS INDEX: 24.48 KG/M2 | OXYGEN SATURATION: 98 % | HEIGHT: 70 IN | DIASTOLIC BLOOD PRESSURE: 97 MMHG

## 2019-09-09 DIAGNOSIS — Q85.03 SCHWANNOMATOSIS (H): ICD-10-CM

## 2019-09-09 DIAGNOSIS — M89.8X8 MASS OF SPINE: ICD-10-CM

## 2019-09-09 PROCEDURE — G0463 HOSPITAL OUTPT CLINIC VISIT: HCPCS

## 2019-09-09 PROCEDURE — 99204 OFFICE O/P NEW MOD 45 MIN: CPT | Performed by: NEUROLOGICAL SURGERY

## 2019-09-09 ASSESSMENT — PAIN SCALES - GENERAL: PAINLEVEL: MILD PAIN (2)

## 2019-09-09 ASSESSMENT — MIFFLIN-ST. JEOR: SCORE: 1581.9

## 2019-09-09 NOTE — PROGRESS NOTES
I was asked by Dr. Mijares to see this patient in consultation    64M w/ several enhancing spinal lesions, large L1-2 intradural lesion with severe stenosis. 3 months of progressive, positional, severe throbbing low back pain, with radiation to the bilateral groin and anterior thighs.  Much worse when he sits or lies down, improved with standing.  MR of neuro axis showed large L1-2 enhancing intradural lesion, with small lesions at T1, T9-10, and L1, and a 3.5 cm lesion extending through the S2 foramen.       Past Medical History:   Diagnosis Date     Hyperlipidemia LDL goal <130      Sebaceous cyst 3/20/08    left posterior shoulder     Past Surgical History:   Procedure Laterality Date     COLONOSCOPY  10/24/2013    Procedure: COLONOSCOPY;  Tubular adenoma of colon  pkt sent 10/7  rx sent  ;  Surgeon: Lenin Lopez MD;  Location: [facility] OR     COLONOSCOPY WITH CO2 INSUFFLATION N/A 1/29/2019    Procedure: COLONOSCOPY WITH CO2 INSUFFLATION;  Surgeon: Kaleb Morelos MD;  Location: [facility] OR     NO HISTORY OF SURGERY       Social History     Socioeconomic History     Marital status:      Spouse name: Gabriela     Number of children: 2     Years of education: Not on file     Highest education level: Not on file   Occupational History     Occupation:      Comment: [employer]     Employer: [employer]   Social Needs     Financial resource strain: Not on file     Food insecurity:     Worry: Not on file     Inability: Not on file     Transportation needs:     Medical: Not on file     Non-medical: Not on file   Tobacco Use     Smoking status: Never Smoker     Smokeless tobacco: Never Used   Substance and Sexual Activity     Alcohol use: No     Drug use: No     Sexual activity: Yes     Partners: Female   Lifestyle     Physical activity:     Days per week: Not on file     Minutes per session: Not on file     Stress: Not on file   Relationships     Social connections:     Talks on phone: Not on file     Gets  "together: Not on file     Attends Jew service: Not on file     Active member of club or organization: Not on file     Attends meetings of clubs or organizations: Not on file     Relationship status: Not on file     Intimate partner violence:     Fear of current or ex partner: Not on file     Emotionally abused: Not on file     Physically abused: Not on file     Forced sexual activity: Not on file   Other Topics Concern      Service No     Blood Transfusions No     Caffeine Concern No     Occupational Exposure No     Hobby Hazards No     Sleep Concern No     Stress Concern No     Weight Concern No     Special Diet No     Back Care No     Exercise Yes     Bike Helmet No     Seat Belt Yes     Self-Exams No     Parent/sibling w/ CABG, MI or angioplasty before 65F 55M? No   Social History Narrative     Not on file     Family History   Problem Relation Age of Onset     Hypertension Mother          age 86-stroke      Diabetes Mother         developed in her 70s, now age 77 or 78     Cerebrovascular Disease Mother      Hypertension Father      Cancer - colorectal Father         age 74 or 75     Hypertension Brother      Asthma No family hx of      C.A.D. No family hx of      Breast Cancer No family hx of      Prostate Cancer No family hx of      Thyroid Disease No family hx of      Osteoporosis No family hx of         ROS: 10 point ROS neg other than the symptoms noted above in the HPI.    Physical Exam  BP (!) 146/97 (BP Location: Left arm, Patient Position: Sitting, Cuff Size: Adult Large)   Pulse 75   Temp 97.9  F (36.6  C) (Oral)   Ht 1.778 m (5' 10\")   Wt 77.6 kg (171 lb)   SpO2 98%   BMI 24.54 kg/m    HEENT:  Normocephalic, atraumatic.  PERRLA.  EOM s intact.  Visual fields full to gross exam  Neck:  Supple, non-tender, without lymphadenopathy.  Heart:  No peripheral edema  Lungs:  No SOB  Abdomen:  Non-distended.   Skin:  Warm and dry.  Extremities:  No edema, cyanosis or " clubbing.  Psychiatric:  No apparent distress  Musculoskeletal:  Normal bulk and tone    NEUROLOGICAL EXAMINATION:     Mental status:  Alert and Oriented x 3, speech is fluent.  Cranial nerves:  II-XII intact.   Motor:    Shoulder Abduction:  Right:  5/5   Left:  5/5  Biceps:                      Right:  5/5   Left:  5/5  Triceps:                     Right:  5/5   Left:  5/5  Wrist Extensors:       Right:  5/5   Left:  5/5  Wrist Flexors:           Right:  5/5   Left:  5/5  interosseus :            Right:  5/5   Left:  5/5  Hip Flexion:                Right: 5/5  Left:  5/5  Quadriceps:             Right:  5/5  Left:  5/5  Hamstrings:             Right:  5/5  Left:  5/5  Gastroc Soleus:        Right:  5/5  Left:  5/5  Tib/Ant:                      Right:  5/5  Left:  5/5  EHL:                     Right:  5/5  Left:  5/5  Sensation:  Numbness along anterior bilateral thighs  Reflexes:  Negative Babinski.  Negative Clonus.  Negative Velez's.  Coordination:  Smooth finger to nose testing.   Negative pronator drift.  Smooth tandem walking.    A/P:  64M w/ several enhancing spinal lesions, large L1-2 intradural lesion with severe stenosis    I had a discussion with the patient, reviewing the history, symptoms, and imaging  Discussed differential, and most likely diagnosis of schwannomatosis  Recommend resection of the L1-2 mass given size, symptoms, and to establish pathologic diagnosis  Risks and benefits discussed

## 2019-09-09 NOTE — LETTER
9/9/2019         RE: Shelia Ortiz  [address]  [place] [address]        Dear Colleague,    Thank you for referring your patient, Shelia Ortiz, to the [facility]. Please see a copy of my visit note below.       Patient Education    Education included but not limited to:  - Surgical risks: blood clots, urinating difficulties, nerve damage, infection.  - Pre-operative physical with primary care physician within 30 days of surgical date.   - Pre-operative clearance from other pertaining specialties.   - Discontinue NSAIDS x 7 days prior to surgical date.   -Do not begin taking NSAIDs (Advil, Motrin, Ibuprofen, Nuprin, Diclofenac, Meloxicam, Aleve, Celebrex, Aspirin, etc.) until 6 weeks after surgery if you had a fusion. May cause bleeding and interfere with bone healing.    -May try Tylenol for pain.  -Smoking cessation  -Discussed being off work after surgery, short term disability, FMLA, etc.   -Forms to be completed    -Pre-op timeline: NPO, shower, medications    -Hospital stay: Checking in, surgery, recovery room, hospital room.    - Post operative pain management: narcotics, muscle relaxants, ice, etc.   -No driving while taking narcotics     -Post operative incision care:   Keep your incision clean and dry.   Okay to shower. No submerging in water until incision healed.   Watch for signs of infection and notify clinic if drainage or fever develops.   - Post operative activity limitations recommended until follow up appointment: no lifting > 10 pounds; limited bending, twisting, overhead reaching.  -If a brace is required per Dr. Mittal, Orth[facility] will fit you for the brace in the hospital.  - Follow up appointments: 6 week post op, 3 months post op, 6 months post op, 1 year post op. Please call to schedule follow up appointment at [phone].   - Education book was also given to the patient for further review.      Patient verbalized understanding of above  instructions. All questions were answered to the best of my ability and the patient's satisfaction. Patient advised to call with any additional questions or concerns.              I was asked by Dr. Mijares to see this patient in consultation    64M w/ several enhancing spinal lesions, large L1-2 intradural lesion with severe stenosis. 3 months of progressive, positional, severe throbbing low back pain, with radiation to the bilateral groin and anterior thighs.  Much worse when he sits or lies down, improved with standing.  MR of neuro axis showed large L1-2 enhancing intradural lesion, with small lesions at T1, T9-10, and L1, and a 3.5 cm lesion extending through the S2 foramen.       Past Medical History:   Diagnosis Date     Hyperlipidemia LDL goal <130      Sebaceous cyst 3/20/08    left posterior shoulder     Past Surgical History:   Procedure Laterality Date     COLONOSCOPY  10/24/2013    Procedure: COLONOSCOPY;  Tubular adenoma of colon  pkt sent 10/7  rx sent  ;  Surgeon: Lenin Lopez MD;  Location: [facility] OR     COLONOSCOPY WITH CO2 INSUFFLATION N/A 1/29/2019    Procedure: COLONOSCOPY WITH CO2 INSUFFLATION;  Surgeon: Kaleb Morelos MD;  Location: [facility] OR     NO HISTORY OF SURGERY       Social History     Socioeconomic History     Marital status:      Spouse name: Gabriela     Number of children: 2     Years of education: Not on file     Highest education level: Not on file   Occupational History     Occupation:      Comment: [employer]     Employer: [employer]   Social Needs     Financial resource strain: Not on file     Food insecurity:     Worry: Not on file     Inability: Not on file     Transportation needs:     Medical: Not on file     Non-medical: Not on file   Tobacco Use     Smoking status: Never Smoker     Smokeless tobacco: Never Used   Substance and Sexual Activity     Alcohol use: No     Drug use: No     Sexual activity: Yes     Partners: Female   Lifestyle     Physical  "activity:     Days per week: Not on file     Minutes per session: Not on file     Stress: Not on file   Relationships     Social connections:     Talks on phone: Not on file     Gets together: Not on file     Attends Yazidism service: Not on file     Active member of club or organization: Not on file     Attends meetings of clubs or organizations: Not on file     Relationship status: Not on file     Intimate partner violence:     Fear of current or ex partner: Not on file     Emotionally abused: Not on file     Physically abused: Not on file     Forced sexual activity: Not on file   Other Topics Concern      Service No     Blood Transfusions No     Caffeine Concern No     Occupational Exposure No     Hobby Hazards No     Sleep Concern No     Stress Concern No     Weight Concern No     Special Diet No     Back Care No     Exercise Yes     Bike Helmet No     Seat Belt Yes     Self-Exams No     Parent/sibling w/ CABG, MI or angioplasty before 65F 55M? No   Social History Narrative     Not on file     Family History   Problem Relation Age of Onset     Hypertension Mother          age 86-stroke      Diabetes Mother         developed in her 70s, now age 77 or 78     Cerebrovascular Disease Mother      Hypertension Father      Cancer - colorectal Father         age 74 or 75     Hypertension Brother      Asthma No family hx of      C.A.D. No family hx of      Breast Cancer No family hx of      Prostate Cancer No family hx of      Thyroid Disease No family hx of      Osteoporosis No family hx of         ROS: 10 point ROS neg other than the symptoms noted above in the HPI.    Physical Exam  BP (!) 146/97 (BP Location: Left arm, Patient Position: Sitting, Cuff Size: Adult Large)   Pulse 75   Temp 97.9  F (36.6  C) (Oral)   Ht 1.778 m (5' 10\")   Wt 77.6 kg (171 lb)   SpO2 98%   BMI 24.54 kg/m     HEENT:  Normocephalic, atraumatic.  PERRLA.  EOM s intact.  Visual fields full to gross exam  Neck:  Supple, " non-tender, without lymphadenopathy.  Heart:  No peripheral edema  Lungs:  No SOB  Abdomen:  Non-distended.   Skin:  Warm and dry.  Extremities:  No edema, cyanosis or clubbing.  Psychiatric:  No apparent distress  Musculoskeletal:  Normal bulk and tone    NEUROLOGICAL EXAMINATION:     Mental status:  Alert and Oriented x 3, speech is fluent.  Cranial nerves:  II-XII intact.   Motor:    Shoulder Abduction:  Right:  5/5   Left:  5/5  Biceps:                      Right:  5/5   Left:  5/5  Triceps:                     Right:  5/5   Left:  5/5  Wrist Extensors:       Right:  5/5   Left:  5/5  Wrist Flexors:           Right:  5/5   Left:  5/5  interosseus :            Right:  5/5   Left:  5/5  Hip Flexion:                Right: 5/5  Left:  5/5  Quadriceps:             Right:  5/5  Left:  5/5  Hamstrings:             Right:  5/5  Left:  5/5  Gastroc Soleus:        Right:  5/5  Left:  5/5  Tib/Ant:                      Right:  5/5  Left:  5/5  EHL:                     Right:  5/5  Left:  5/5  Sensation:  Numbness along anterior bilateral thighs  Reflexes:  Negative Babinski.  Negative Clonus.  Negative Velez's.  Coordination:  Smooth finger to nose testing.   Negative pronator drift.  Smooth tandem walking.    A/P:  64M w/ several enhancing spinal lesions, large L1-2 intradural lesion with severe stenosis    I had a discussion with the patient, reviewing the history, symptoms, and imaging  Discussed differential, and most likely diagnosis of schwannomatosis  Recommend resection of the L1-2 mass given size, symptoms, and to establish pathologic diagnosis  Risks and benefits discussed         Again, thank you for allowing me to participate in the care of your patient.        Sincerely,        Paco Mittal MD

## 2019-09-09 NOTE — NURSING NOTE
"Shelia Ortiz is a 64 year old male who presents for:  Chief Complaint   Patient presents with     Consult For     Abnormal MRI showing lumbar spine pain mass.         Initial Vitals:  BP (!) 146/97 (BP Location: Left arm, Patient Position: Sitting, Cuff Size: Adult Large)   Pulse 75   Temp 97.9  F (36.6  C) (Oral)   Ht 5' 10\" (1.778 m)   Wt 171 lb (77.6 kg)   SpO2 98%   BMI 24.54 kg/m   Estimated body mass index is 24.54 kg/m  as calculated from the following:    Height as of this encounter: 5' 10\" (1.778 m).    Weight as of this encounter: 171 lb (77.6 kg).. Body surface area is 1.96 meters squared. BP completed using cuff size: large  Mild Pain (2)        Nursing Comments: Patient states his pain level is at a 2 out of 10 today. He states he has low back pain and left sided leg pain.         Gladys Magallon, MUSHTAQ    "

## 2019-09-09 NOTE — PATIENT INSTRUCTIONS
Surgery scheduled at [facility] for L1-2 Laminectomy for resection of mass    Pre-Operative:  -Surgical risks: blood clots in the leg or lung, problems urinating, nerve damage, drainage from the incision, infection, stiffness  - Pre-operative physical with primary care physician within 30 days of surgical date.   -Stop all foods and liquids 8 hours prior to surgery.  -Shower procedure: please shower with antibacterial soap the night before surgery and morning of surgery. Refer to information sheet in folder.   - Discontinue Aspirin, NSAIDs (Advil, Ibuprofen, Naproxen, Nuprin, Diclofenac, Meloxicam, Aleve, Celebrex) x 7 days prior to surgical date. After surgery, do not begin taking these medications until given clearance.  - May try Tylenol for pain.    Post-Operative:  -Hospital stay: inpatient procedure- likely 2-3 day hospital stay.  - Post operative pain  will require pain medications and muscle relaxants. You will receive medication upon discharge.  -Do NOT drive while taking narcotic pain medication.  -Post operative incision care-    -Watch for signs of infection: redness, swelling, warmth, drainage, and fever of 101 degrees or higher. Notify clinic [phone].   -Keep incision clean and dry. You may shower. No submerging incision in water such as pools, hot tubs, baths for at least 8 weeks or until incision is healed.   - Post operative activity limitations for 4-6 weeks after surgery: no lifting > 10 pounds, limited bending, twisting, or overhead reaching. You will be re-evaluated at your follow up appointments.   -If you are currently employed, you will need to be off work for recovery and healing. Please fax any FMLA/short term disability paperwork to [phone]. You may call our clinic when you'd like to return to work and we can provide a work letter.   - Follow up appointments: 6 week post op and 3 months post op. Please call to schedule follow up appointment at  [phone].

## 2019-09-14 ENCOUNTER — MYC MEDICAL ADVICE (OUTPATIENT)
Dept: FAMILY MEDICINE | Facility: CLINIC | Age: 64
End: 2019-09-14

## 2019-09-14 ENCOUNTER — OFFICE VISIT (OUTPATIENT)
Dept: URGENT CARE | Facility: URGENT CARE | Age: 64
End: 2019-09-14
Payer: COMMERCIAL

## 2019-09-14 VITALS
HEART RATE: 81 BPM | TEMPERATURE: 97.9 F | HEIGHT: 70 IN | RESPIRATION RATE: 16 BRPM | OXYGEN SATURATION: 98 % | BODY MASS INDEX: 24.77 KG/M2 | DIASTOLIC BLOOD PRESSURE: 98 MMHG | WEIGHT: 173 LBS | SYSTOLIC BLOOD PRESSURE: 138 MMHG

## 2019-09-14 DIAGNOSIS — D36.10 SCHWANNOMA: ICD-10-CM

## 2019-09-14 DIAGNOSIS — M54.16 LUMBAR RADICULOPATHY: Primary | ICD-10-CM

## 2019-09-14 PROCEDURE — 99213 OFFICE O/P EST LOW 20 MIN: CPT | Performed by: INTERNAL MEDICINE

## 2019-09-14 RX ORDER — LIDOCAINE 50 MG/G
1 PATCH TOPICAL EVERY 24 HOURS
Qty: 6 PATCH | Refills: 1 | Status: SHIPPED | OUTPATIENT
Start: 2019-09-14 | End: 2019-10-02

## 2019-09-14 ASSESSMENT — MIFFLIN-ST. JEOR: SCORE: 1590.97

## 2019-09-15 NOTE — PROGRESS NOTES
"SUBJECTIVE:  Shelia Ortiz, a 64 year old male, presents for evaluation of back and neck pain. He has had this for months.  Eventually diagnosed with schwannoma near L2 and is being seen by neurosurgeon.   Pain is in the gluteal region bilaterally and also will radiate down to the knee on the left side.  He had been doing ok with diclofenac EC 75 mg BID and also has an Rx for prednisone. Is taking ibuprofen as well.  He has appointment with his primary care coming up next week.  Now having an increase in pain in the left side.     OBJECTIVE:  BP (!) 138/98   Pulse 81   Temp 97.9  F (36.6  C) (Oral)   Resp 16   Ht 1.778 m (5' 10\")   Wt 78.5 kg (173 lb)   SpO2 98%   BMI 24.82 kg/m    M/SKEL: there is not bony midline tenderness over the lumbar spine; there is tenderness to palpation in the left lumbar paraspinal muscles; there is pain with  side bending of the  lumbar spine; Straight Leg Raise is positive on the left  NEURO: Strength:           Hip Flexors 5/5 Right, 5/5 Left -           Hip Extensors 5/5 Right, 5/5 Left -           Knee Flexors 5/5 Right, 5/5 Left -           Knee Extensors 5/5 Right, 5/5 Left -           Plantarflexors 5/5 Right, 5/5 Left -           Dorsiflexors 5/5 Right, 5/5 Left -  Reflexes:          Patellar 1+ Right, 1+ Left          Achilles 1+ Right, 1+ Left  Sensation:         diminished to monofilament  left  L2 dermatome     ASSESSMENT/PLAN:    ICD-10-CM    1. Lumbar radiculopathy M54.16 lidocaine (LIDODERM) 5 % patch   2. Schwannoma D36.10 lidocaine (LIDODERM) 5 % patch     Total time spent face-to-face with the patient/family was 20 minutes of which 15 minutes were spent in counseling and care coordination.  Discussions focussed on the above documented medical issues.    Conrad Galan MD   "

## 2019-09-15 NOTE — PATIENT INSTRUCTIONS
Will prescribe lidocaine patch for you to use to manage the pain over the short term.  Continue taking the diclofenac twice daily every day.  If the pain gets worse, switch over to the prednisone.  Do not take the diclofenac and the prednisone at the same time.  Can also take acetaminophen (Tylenol) 500-1000 mg up to three times daily.  This may be useful as well in addition to the other medications.  It is ok to mix the acetaminophen with your other medications.

## 2019-09-19 ENCOUNTER — OFFICE VISIT (OUTPATIENT)
Dept: FAMILY MEDICINE | Facility: CLINIC | Age: 64
End: 2019-09-19
Payer: COMMERCIAL

## 2019-09-19 VITALS
HEART RATE: 89 BPM | DIASTOLIC BLOOD PRESSURE: 84 MMHG | SYSTOLIC BLOOD PRESSURE: 126 MMHG | TEMPERATURE: 98 F | BODY MASS INDEX: 24.95 KG/M2 | HEIGHT: 70 IN | OXYGEN SATURATION: 97 % | WEIGHT: 174.3 LBS

## 2019-09-19 DIAGNOSIS — M89.8X8 MASS OF SPINE: ICD-10-CM

## 2019-09-19 DIAGNOSIS — Z01.818 PREOP GENERAL PHYSICAL EXAM: Primary | ICD-10-CM

## 2019-09-19 DIAGNOSIS — E78.5 HYPERLIPIDEMIA LDL GOAL <130: ICD-10-CM

## 2019-09-19 DIAGNOSIS — Q85.03 SCHWANNOMATOSIS (H): ICD-10-CM

## 2019-09-19 LAB
ANION GAP SERPL CALCULATED.3IONS-SCNC: 1 MMOL/L (ref 3–14)
BUN SERPL-MCNC: 16 MG/DL (ref 7–30)
CALCIUM SERPL-MCNC: 9 MG/DL (ref 8.5–10.1)
CHLORIDE SERPL-SCNC: 103 MMOL/L (ref 94–109)
CO2 SERPL-SCNC: 33 MMOL/L (ref 20–32)
CREAT SERPL-MCNC: 1.13 MG/DL (ref 0.66–1.25)
ERYTHROCYTE [DISTWIDTH] IN BLOOD BY AUTOMATED COUNT: 13.4 % (ref 10–15)
GFR SERPL CREATININE-BSD FRML MDRD: 69 ML/MIN/{1.73_M2}
GLUCOSE SERPL-MCNC: 105 MG/DL (ref 70–99)
HCT VFR BLD AUTO: 42.7 % (ref 40–53)
HGB BLD-MCNC: 14.1 G/DL (ref 13.3–17.7)
MCH RBC QN AUTO: 30.3 PG (ref 26.5–33)
MCHC RBC AUTO-ENTMCNC: 33 G/DL (ref 31.5–36.5)
MCV RBC AUTO: 92 FL (ref 78–100)
PLATELET # BLD AUTO: 222 10E9/L (ref 150–450)
POTASSIUM SERPL-SCNC: 3.9 MMOL/L (ref 3.4–5.3)
RBC # BLD AUTO: 4.66 10E12/L (ref 4.4–5.9)
SODIUM SERPL-SCNC: 137 MMOL/L (ref 133–144)
WBC # BLD AUTO: 5.7 10E9/L (ref 4–11)

## 2019-09-19 PROCEDURE — 85027 COMPLETE CBC AUTOMATED: CPT | Performed by: FAMILY MEDICINE

## 2019-09-19 PROCEDURE — 99215 OFFICE O/P EST HI 40 MIN: CPT | Performed by: FAMILY MEDICINE

## 2019-09-19 PROCEDURE — 80048 BASIC METABOLIC PNL TOTAL CA: CPT | Performed by: FAMILY MEDICINE

## 2019-09-19 PROCEDURE — 93000 ELECTROCARDIOGRAM COMPLETE: CPT | Performed by: FAMILY MEDICINE

## 2019-09-19 PROCEDURE — 36415 COLL VENOUS BLD VENIPUNCTURE: CPT | Performed by: FAMILY MEDICINE

## 2019-09-19 RX ORDER — TRAMADOL HYDROCHLORIDE 50 MG/1
50 TABLET ORAL EVERY 6 HOURS PRN
Qty: 10 TABLET | Refills: 0 | Status: SHIPPED | OUTPATIENT
Start: 2019-09-19 | End: 2019-09-27

## 2019-09-19 ASSESSMENT — PAIN SCALES - GENERAL: PAINLEVEL: NO PAIN (0)

## 2019-09-19 ASSESSMENT — MIFFLIN-ST. JEOR: SCORE: 1596.87

## 2019-09-19 NOTE — PROGRESS NOTES
[address] [address]  [phone]  Dept: [phone]    PRE-OP EVALUATION:  Today's date: 2019    Shelia Ortiz (: 1955) presents for pre-operative evaluation assessment as requested by Dr. Mittal.  He requires evaluation and anesthesia risk assessment prior to undergoing surgery/procedure for treatment of Mass of Spine .    Proposed Surgery/ Procedure: L1-L2 Laminectomy and Resection of Intrudural Mass  Date of Surgery/ Procedure: 10/4/2019  Time of Surgery/ Procedure: 11:10am  Hospital/Surgical Facility: [facility]  Fax number for surgical facility: Unknown  Primary Physician: Georgia Burleson  Type of Anesthesia Anticipated: General    Patient has a Health Care Directive or Living Will:  NO    1. NO - Do you have a history of heart attack, stroke, stent, bypass or surgery on an artery in the head, neck, heart or legs?  2. NO - Do you ever have any pain or discomfort in your chest?  3. NO - Do you have a history of  Heart Failure?  4. NO - Are you troubled by shortness of breath when: walking on the level, up a slight hill or at night?  5. NO - Do you currently have a cold, bronchitis or other respiratory infection?  6. NO - Do you have a cough, shortness of breath or wheezing?  7. NO - Do you sometimes get pains in the calves of your legs when you walk?  8. NO - Do you or anyone in your family have previous history of blood clots?  9. NO - Do you or does anyone in your family have a serious bleeding problem such as prolonged bleeding following surgeries or cuts?  10. NO - Have you ever had problems with anemia or been told to take iron pills?  11. NO - Have you had any abnormal blood loss such as black, tarry or bloody stools, or abnormal vaginal bleeding?  12. NO - Have you ever had a blood transfusion?  13. NO - Have you or any of your relatives ever had problems with anesthesia?  14. YES - DO YOU HAVE SLEEP APNEA, EXCESSIVE SNORING OR  DAYTIME DROWSINESS? Snoring reported.  15. NO - Do you have any prosthetic heart valves?  16. NO - Do you have prosthetic joints?  17. NO - Is there any chance that you may be pregnant?      HPI:     HPI related to upcoming procedure: 64 year old with 5 months of progressively worsening symptoms of back pain with pain radiating into the left buttock and thigh.  MRI showed a 2.6 x 1.1 cm intradural, extra medullary, peripherally enhancing cystic mass approximately at the level of L1-2.  There is additional enhancing mass at right S2.      See problem list for active medical problems.  Problems all longstanding and stable, except as noted/documented.  See ROS for pertinent symptoms related to these conditions.      MEDICAL HISTORY:     Patient Active Problem List    Diagnosis Date Noted     Chronic left-sided low back pain with left-sided sciatica 08/24/2019     Priority: Medium     Elevated blood pressure reading without diagnosis of hypertension 05/30/2017     Priority: Medium     Advanced directives, counseling/discussion 01/30/2013     Priority: Medium     Discussed advance care planning with patient; information given to patient to review. 1/30/2013          Atopic dermatitis 12/08/2011     Priority: Medium     Hyperlipidemia LDL goal <130 11/30/2010     Priority: Medium      Past Medical History:   Diagnosis Date     Hyperlipidemia LDL goal <130      Sebaceous cyst 3/20/08    left posterior shoulder     Past Surgical History:   Procedure Laterality Date     COLONOSCOPY  10/24/2013    Procedure: COLONOSCOPY;  Tubular adenoma of colon  pkt sent 10/7  rx sent  ;  Surgeon: Lenin Lopez MD;  Location: [facility] OR     COLONOSCOPY WITH CO2 INSUFFLATION N/A 1/29/2019    Procedure: COLONOSCOPY WITH CO2 INSUFFLATION;  Surgeon: Kaleb Morelos MD;  Location: [facility] OR     NO HISTORY OF SURGERY       Current Outpatient Medications   Medication Sig Dispense Refill     calcium carbonate-vitamin D (OS-JACQUE) 500-400  "MG-UNIT tablet Take 1 tablet by mouth daily       diclofenac (VOLTAREN) 75 MG EC tablet Take 1 tablet (75 mg) by mouth 2 times daily 40 tablet 1     lidocaine (LIDODERM) 5 % patch Place 1 patch onto the skin every 24 hours To prevent lidocaine toxicity, patient should be patch free for 12 hrs daily. 6 patch 1     vitamin D3 (CHOLECALCIFEROL) 2000 units tablet Take 1 tablet by mouth daily       OTC products: None, except as noted above    No Known Allergies   Latex Allergy: NO    Social History     Tobacco Use     Smoking status: Never Smoker     Smokeless tobacco: Never Used   Substance Use Topics     Alcohol use: No     History   Drug Use No       REVIEW OF SYSTEMS:   CONSTITUTIONAL: NEGATIVE for fever, chills, change in weight  INTEGUMENTARY/SKIN: NEGATIVE for worrisome rashes, moles or lesions  EYES: NEGATIVE for vision changes or irritation  ENT/MOUTH: NEGATIVE for ear, mouth and throat problems  RESP: NEGATIVE for significant cough or SOB  BREAST: NEGATIVE for masses, tenderness or discharge  CV: NEGATIVE for chest pain, palpitations or peripheral edema  GI: NEGATIVE for nausea, abdominal pain, heartburn, or change in bowel habits  : NEGATIVE for frequency, dysuria, or hematuria  MUSCULOSKELETAL:see HPI  NEURO: NEGATIVE for weakness, dizziness or paresthesias  ENDOCRINE: NEGATIVE for temperature intolerance, skin/hair changes  HEME: NEGATIVE for bleeding problems  PSYCHIATRIC: NEGATIVE for changes in mood or affect    EXAM:   /84 (BP Location: Right arm, Patient Position: Chair, Cuff Size: Adult Regular)   Pulse 89   Temp 98  F (36.7  C) (Oral)   Ht 1.778 m (5' 10\")   Wt 79.1 kg (174 lb 4.8 oz)   SpO2 97%   BMI 25.01 kg/m      GENERAL APPEARANCE: healthy, alert and no distress     EYES: EOMI,  PERRL     HENT: ear canals and TM's normal and nose and mouth without ulcers or lesions     NECK: no adenopathy, no asymmetry, masses, or scars and thyroid normal to palpation     RESP: lungs clear to " auscultation - no rales, rhonchi or wheezes     CV: regular rates and rhythm, normal S1 S2, no S3 or S4 and no murmur, click or rub     ABDOMEN:  soft, nontender, no HSM or masses and bowel sounds normal     MS: extremities normal- no gross deformities noted and FROM all extremities and back     SKIN: no suspicious lesions or rashes     NEURO: Normal strength and tone, sensory exam grossly normal, mentation intact and speech normal.  Mild pain left leg to thigh with SLR.     PSYCH: mentation appears normal. and affect normal/bright     LYMPHATICS: No cervical adenopathy    DIAGNOSTICS:     EKG: appears normal, NSR, normal axis, normal intervals, no acute ST/T changes c/w ischemia, no LVH by voltage criteria, there are no prior tracings available  Labs Resulted Today:   Results for orders placed or performed in visit on 09/19/19   Basic metabolic panel   Result Value Ref Range    Sodium 137 133 - 144 mmol/L    Potassium 3.9 3.4 - 5.3 mmol/L    Chloride 103 94 - 109 mmol/L    Carbon Dioxide 33 (H) 20 - 32 mmol/L    Anion Gap 1 (L) 3 - 14 mmol/L    Glucose 105 (H) 70 - 99 mg/dL    Urea Nitrogen 16 7 - 30 mg/dL    Creatinine 1.13 0.66 - 1.25 mg/dL    GFR Estimate 69 >60 mL/min/[1.73_m2]    GFR Estimate If Black 80 >60 mL/min/[1.73_m2]    Calcium 9.0 8.5 - 10.1 mg/dL   CBC with platelets   Result Value Ref Range    WBC 5.7 4.0 - 11.0 10e9/L    RBC Count 4.66 4.4 - 5.9 10e12/L    Hemoglobin 14.1 13.3 - 17.7 g/dL    Hematocrit 42.7 40.0 - 53.0 %    MCV 92 78 - 100 fl    MCH 30.3 26.5 - 33.0 pg    MCHC 33.0 31.5 - 36.5 g/dL    RDW 13.4 10.0 - 15.0 %    Platelet Count 222 150 - 450 10e9/L   EKG 12-lead complete w/read - Clinics    Impression    Sinus rhythm, rate 90.  No acute ST-T wave changes noted.  No comparison available.    Jannet Mijares MD        Recent Labs   Lab Test 09/03/19  1256 01/18/19  0741  01/30/13  1046   HGB 13.7  --   --  13.7     --   --   --     141   < >  --    POTASSIUM 3.2* 3.7   < >   --    CR 1.01 1.08   < >  --     < > = values in this interval not displayed.        IMPRESSION:   Reason for surgery/procedure: Mass of Spine //   L1-L2 Laminectomy and Resection of Intrudural Mass  Diagnosis/reason for consult: surgical clearance    The proposed surgical procedure is considered INTERMEDIATE risk.    REVISED CARDIAC RISK INDEX  The patient has the following serious cardiovascular risks for perioperative complications such as (MI, PE, VFib and 3  AV Block):  No serious cardiac risks  INTERPRETATION: 0 risks: Class I (very low risk - 0.4% complication rate)    The patient has the following additional risks for perioperative complications:  No identified additional risks      ICD-10-CM    1. Preop general physical exam Z01.818 EKG 12-lead complete w/read - Clinics     Basic metabolic panel     CBC with platelets   2. Mass of spine M89.9    3. Schwannomatosis (H) Q85.03 traMADol (ULTRAM) 50 MG tablet   4. Hyperlipidemia LDL goal <130 E78.5        RECOMMENDATIONS:         --Patient is to take all scheduled medications on the day of surgery EXCEPT for modifications listed below.    Anticoagulant or Antiplatelet Medication Use  NSAIDS: Diclofenac (Voltaren):    Stop one day prior to surgery        APPROVAL GIVEN to proceed with proposed procedure, without further diagnostic evaluation       Signed Electronically by: Jannet Mijares MD    Copy of this evaluation report is provided to requesting physician.    [facility] Preop Guidelines    Revised Cardiac Risk Index

## 2019-09-19 NOTE — PATIENT INSTRUCTIONS
Proceed with surgery as planned.      NSAIDS: Diclofenac (Voltaren):    Stop one day prior to surgery  I have sent in tramadol to use for pain if needed prior to surgery.        Before Your Surgery      Call your surgeon if there is any change in your health. This includes signs of a cold or flu (such as a sore throat, runny nose, cough, rash or fever).    Do not smoke, drink alcohol or take over the counter medicine (unless your surgeon or primary care doctor tells you to) for the 24 hours before and after surgery.    If you take prescribed drugs: Follow your doctor s orders about which medicines to take and which to stop until after surgery.    Eating and drinking prior to surgery: follow the instructions from your surgeon    Take a shower or bath the night before surgery. Use the soap your surgeon gave you to gently clean your skin. If you do not have soap from your surgeon, use your regular soap. Do not shave or scrub the surgery site.  Wear clean pajamas and have clean sheets on your bed.

## 2019-09-25 ENCOUNTER — TELEPHONE (OUTPATIENT)
Dept: NEUROSURGERY | Facility: CLINIC | Age: 64
End: 2019-09-25

## 2019-09-25 NOTE — TELEPHONE ENCOUNTER
Patient called requesting Tramadol refill. Informed patient that he should reach out to his PCP as she was the one to fill it originally and if he has any troubles we can try and take care of it. Patient was unaware that he had to stop taking Voltaren 7 days prior to surgery and would like a refill of the tramadol to get him through those 7 days as his current prescription will only last him 3 days. No further questions or concerns at this time.

## 2019-09-26 DIAGNOSIS — Q85.03 SCHWANNOMATOSIS (H): ICD-10-CM

## 2019-09-27 ENCOUNTER — TELEPHONE (OUTPATIENT)
Dept: FAMILY MEDICINE | Facility: CLINIC | Age: 64
End: 2019-09-27

## 2019-09-27 ENCOUNTER — TELEPHONE (OUTPATIENT)
Dept: NEUROSURGERY | Facility: CLINIC | Age: 64
End: 2019-09-27

## 2019-09-27 DIAGNOSIS — Q85.03 SCHWANNOMATOSIS (H): ICD-10-CM

## 2019-09-27 RX ORDER — TRAMADOL HYDROCHLORIDE 50 MG/1
50 TABLET ORAL EVERY 6 HOURS PRN
Qty: 10 TABLET | Refills: 0 | Status: ON HOLD | OUTPATIENT
Start: 2019-09-27 | End: 2019-10-06

## 2019-09-27 NOTE — TELEPHONE ENCOUNTER
I would recommend 7 days prior if that is what surgeon wants done- I will forward to Dr. Mijares to review as well but if surgeon recommends 7 days that would be my recommendation

## 2019-09-27 NOTE — TELEPHONE ENCOUNTER
1) Patient informed on the below.    He would like Dr. Mijares to advise on Diclofenac when to stop prior to surgery.    2) If he is to stop Diclofenac 7 days prior to surgery, requesting another 10 tabs of Ultram to have on hand to be able to take 3 tabs a day PRN.  He placed a refill request. Patient has not used any of Tramadol he has on hand now.    Please review and advise when able.  Love Falcon RN

## 2019-09-27 NOTE — TELEPHONE ENCOUNTER
Reason for Call:  Medication Question    Detailed comments: Pt. Is stating that he is currently taking   diclofenac (VOLTAREN) 75 MG EC tablet and is confused because the Neuro Nurse told him to stop taking medication 7 days before surgery on 10/04/2019, [facility] Provider told him 3 days prior, and his paperwork states 1 day before.  He would like to request a call back to advise. Thank you.    Phone Number Patient can be reached at: Home number on file [phone] (home)    Best Time: Any    Can we leave a detailed message on this number? YES    Call taken on 9/27/2019 at 12:55 PM by Iris Kidd

## 2019-09-27 NOTE — TELEPHONE ENCOUNTER
Requested Prescriptions   Pending Prescriptions Disp Refills     traMADol (ULTRAM) 50 MG tablet [Pharmacy Med Name: TRAMADOL HCL 50 MG TABLET]  Last Written Prescription Date:  9/19/19  Last Fill Quantity: 10 tablet,  # refills: 0   Last office visit: 9/19/2019 with prescribing provider:  Dr. Mijares    Routing refill request to provider for review/approval because:  Drug not on the [facility], P or  Health refill protocol or controlled substance     Future Office Visit:   Next 5 appointments (look out 90 days)    Nov 20, 2019  9:00 AM CST  Return Visit with Stefanie Stewart PA-C  [facility] ([facility]) [address] [place] [id number] [phone]          10 tablet 0     Sig: TAKE 1 TABLET (50 MG) BY MOUTH EVERY 6 HOURS AS NEEDED FOR SEVERE PAIN       There is no refill protocol information for this order

## 2019-09-27 NOTE — TELEPHONE ENCOUNTER
Please call patient re:  voltaren - if surgeon wants him off 7 days prior - follow these instructions.  I am sending a refill of the tramadol now for use prior to surgery.  DEREK

## 2019-09-27 NOTE — TELEPHONE ENCOUNTER
Type of surgery:  L1-2 laminectomy and resection of intradural mass   Location of surgery: [facility]  Date and time of surgery: 10/4/19 @11:00am  Surgeon: MD Kyrie  Pre-Op Appt Date: 9/24/19 beto/ Sarah Beth  Post-Op Appt Date: 11/20/19 beto/ Pat   Packet sent out: Yes  Pre-cert/Authorization completed:  Yes  Date: 9/27/19 EMILY

## 2019-09-29 ENCOUNTER — E-VISIT (OUTPATIENT)
Dept: FAMILY MEDICINE | Facility: CLINIC | Age: 64
End: 2019-09-29
Payer: COMMERCIAL

## 2019-09-29 ENCOUNTER — MYC MEDICAL ADVICE (OUTPATIENT)
Dept: FAMILY MEDICINE | Facility: CLINIC | Age: 64
End: 2019-09-29

## 2019-09-29 DIAGNOSIS — Q85.03 SCHWANNOMATOSIS (H): ICD-10-CM

## 2019-09-29 DIAGNOSIS — G89.29 CHRONIC LEFT-SIDED LOW BACK PAIN WITH LEFT-SIDED SCIATICA: ICD-10-CM

## 2019-09-29 DIAGNOSIS — M54.42 CHRONIC LEFT-SIDED LOW BACK PAIN WITH LEFT-SIDED SCIATICA: ICD-10-CM

## 2019-09-29 PROCEDURE — 99444 ZZC PHYSICIAN ONLINE EVALUATION & MANAGEMENT SERVICE: CPT | Performed by: FAMILY MEDICINE

## 2019-09-29 RX ORDER — HYDROCODONE BITARTRATE AND ACETAMINOPHEN 5; 325 MG/1; MG/1
1-2 TABLET ORAL EVERY 6 HOURS PRN
Qty: 18 TABLET | Refills: 0 | Status: ON HOLD | OUTPATIENT
Start: 2019-09-29 | End: 2019-10-06

## 2019-09-30 RX ORDER — TRAMADOL HYDROCHLORIDE 50 MG/1
50 TABLET ORAL EVERY 6 HOURS PRN
Qty: 10 TABLET | Refills: 0 | OUTPATIENT
Start: 2019-09-30 | End: 2019-10-03

## 2019-09-30 NOTE — TELEPHONE ENCOUNTER
Patient updated on the below.  He will  [place] from the pharmacy when opens this morning.  Because pain was not controlled with tramadol, patient was using diclofenac and now is 4-5 days before surgery not 7 days; patient is concerned with increased bleeding risk.  Advised to contact surgeon.  Patient agreed with a plan.  Mena Reveles RN

## 2019-09-30 NOTE — TELEPHONE ENCOUNTER
Routing refill request to provider for review/approval because:  Drug not on the [facility] refill protocol     Donna Cross RN, BSN, PHN

## 2019-10-02 ENCOUNTER — HOSPITAL ENCOUNTER (INPATIENT)
Facility: CLINIC | Age: 64
LOS: 4 days | Discharge: HOME | DRG: 520 | End: 2019-10-06
Attending: NURSE PRACTITIONER | Admitting: HOSPITALIST
Payer: COMMERCIAL

## 2019-10-02 ENCOUNTER — TELEPHONE (OUTPATIENT)
Dept: NEUROSURGERY | Facility: CLINIC | Age: 64
End: 2019-10-02

## 2019-10-02 DIAGNOSIS — Z98.890 S/P LAMINECTOMY: ICD-10-CM

## 2019-10-02 DIAGNOSIS — M54.42 BILATERAL LOW BACK PAIN WITH LEFT-SIDED SCIATICA: ICD-10-CM

## 2019-10-02 DIAGNOSIS — G89.29 CHRONIC BILATERAL LOW BACK PAIN WITH LEFT-SIDED SCIATICA: Primary | ICD-10-CM

## 2019-10-02 DIAGNOSIS — M54.42 CHRONIC BILATERAL LOW BACK PAIN WITH LEFT-SIDED SCIATICA: Primary | ICD-10-CM

## 2019-10-02 PROBLEM — M54.9 BACK PAIN: Status: ACTIVE | Noted: 2019-10-02

## 2019-10-02 LAB
ALBUMIN UR-MCNC: NEGATIVE MG/DL
APPEARANCE UR: CLEAR
BILIRUB UR QL STRIP: NEGATIVE
COLOR UR AUTO: NORMAL
GLUCOSE UR STRIP-MCNC: NEGATIVE MG/DL
HGB UR QL STRIP: NEGATIVE
KETONES UR STRIP-MCNC: NEGATIVE MG/DL
LEUKOCYTE ESTERASE UR QL STRIP: NEGATIVE
NITRATE UR QL: NEGATIVE
PH UR STRIP: 7 PH (ref 5–7)
SOURCE: NORMAL
SP GR UR STRIP: 1.01 (ref 1–1.03)
UROBILINOGEN UR STRIP-MCNC: NORMAL MG/DL (ref 0–2)

## 2019-10-02 PROCEDURE — 25000128 H RX IP 250 OP 636: Performed by: HOSPITALIST

## 2019-10-02 PROCEDURE — 25000128 H RX IP 250 OP 636: Performed by: NURSE PRACTITIONER

## 2019-10-02 PROCEDURE — 99285 EMERGENCY DEPT VISIT HI MDM: CPT | Mod: 25

## 2019-10-02 PROCEDURE — 25000132 ZZH RX MED GY IP 250 OP 250 PS 637: Performed by: HOSPITALIST

## 2019-10-02 PROCEDURE — 81003 URINALYSIS AUTO W/O SCOPE: CPT | Performed by: NURSE PRACTITIONER

## 2019-10-02 PROCEDURE — 51702 INSERT TEMP BLADDER CATH: CPT

## 2019-10-02 PROCEDURE — 96374 THER/PROPH/DIAG INJ IV PUSH: CPT

## 2019-10-02 PROCEDURE — 25000132 ZZH RX MED GY IP 250 OP 250 PS 637: Performed by: NURSE PRACTITIONER

## 2019-10-02 PROCEDURE — 25000131 ZZH RX MED GY IP 250 OP 636 PS 637: Performed by: HOSPITALIST

## 2019-10-02 PROCEDURE — 99222 1ST HOSP IP/OBS MODERATE 55: CPT | Mod: AI | Performed by: HOSPITALIST

## 2019-10-02 PROCEDURE — 96375 TX/PRO/DX INJ NEW DRUG ADDON: CPT

## 2019-10-02 PROCEDURE — 12000000 ZZH R&B MED SURG/OB

## 2019-10-02 PROCEDURE — 96376 TX/PRO/DX INJ SAME DRUG ADON: CPT

## 2019-10-02 RX ORDER — HYDROMORPHONE HYDROCHLORIDE 1 MG/ML
0.5 INJECTION, SOLUTION INTRAMUSCULAR; INTRAVENOUS; SUBCUTANEOUS ONCE
Status: COMPLETED | OUTPATIENT
Start: 2019-10-02 | End: 2019-10-02

## 2019-10-02 RX ORDER — DEXAMETHASONE SODIUM PHOSPHATE 10 MG/ML
10 INJECTION, SOLUTION INTRAMUSCULAR; INTRAVENOUS ONCE
Status: COMPLETED | OUTPATIENT
Start: 2019-10-02 | End: 2019-10-02

## 2019-10-02 RX ORDER — METHYLPREDNISOLONE 4 MG/1
4 TABLET ORAL
Status: DISCONTINUED | OUTPATIENT
Start: 2019-10-03 | End: 2019-10-04

## 2019-10-02 RX ORDER — NALOXONE HYDROCHLORIDE 0.4 MG/ML
.1-.4 INJECTION, SOLUTION INTRAMUSCULAR; INTRAVENOUS; SUBCUTANEOUS
Status: DISCONTINUED | OUTPATIENT
Start: 2019-10-02 | End: 2019-10-06 | Stop reason: HOSPADM

## 2019-10-02 RX ORDER — ONDANSETRON 4 MG/1
4 TABLET, ORALLY DISINTEGRATING ORAL EVERY 6 HOURS PRN
Status: DISCONTINUED | OUTPATIENT
Start: 2019-10-02 | End: 2019-10-06 | Stop reason: HOSPADM

## 2019-10-02 RX ORDER — ONDANSETRON 2 MG/ML
4 INJECTION INTRAMUSCULAR; INTRAVENOUS EVERY 6 HOURS PRN
Status: DISCONTINUED | OUTPATIENT
Start: 2019-10-02 | End: 2019-10-06 | Stop reason: HOSPADM

## 2019-10-02 RX ORDER — METHYLPREDNISOLONE 4 MG/1
4 TABLET ORAL AT BEDTIME
Status: DISCONTINUED | OUTPATIENT
Start: 2019-10-04 | End: 2019-10-04

## 2019-10-02 RX ORDER — HYDROMORPHONE HYDROCHLORIDE 1 MG/ML
1 INJECTION, SOLUTION INTRAMUSCULAR; INTRAVENOUS; SUBCUTANEOUS
Status: DISCONTINUED | OUTPATIENT
Start: 2019-10-02 | End: 2019-10-02

## 2019-10-02 RX ORDER — HYDROCODONE BITARTRATE AND ACETAMINOPHEN 5; 325 MG/1; MG/1
1-2 TABLET ORAL EVERY 4 HOURS PRN
Status: DISCONTINUED | OUTPATIENT
Start: 2019-10-02 | End: 2019-10-04

## 2019-10-02 RX ORDER — LIDOCAINE 4 G/G
2 PATCH TOPICAL ONCE
Status: COMPLETED | OUTPATIENT
Start: 2019-10-02 | End: 2019-10-03

## 2019-10-02 RX ORDER — METHYLPREDNISOLONE 4 MG/1
8 TABLET ORAL ONCE
Status: DISCONTINUED | OUTPATIENT
Start: 2019-10-02 | End: 2019-10-02

## 2019-10-02 RX ORDER — METHYLPREDNISOLONE 4 MG/1
4 TABLET ORAL AT BEDTIME
Status: DISCONTINUED | OUTPATIENT
Start: 2019-10-04 | End: 2019-10-02

## 2019-10-02 RX ORDER — ACETAMINOPHEN 325 MG/1
650 TABLET ORAL EVERY 4 HOURS PRN
Status: DISCONTINUED | OUTPATIENT
Start: 2019-10-02 | End: 2019-10-04

## 2019-10-02 RX ORDER — METHYLPREDNISOLONE 4 MG/1
4 TABLET ORAL ONCE
Status: COMPLETED | OUTPATIENT
Start: 2019-10-02 | End: 2019-10-02

## 2019-10-02 RX ORDER — HYDROMORPHONE HYDROCHLORIDE 1 MG/ML
.5-1 INJECTION, SOLUTION INTRAMUSCULAR; INTRAVENOUS; SUBCUTANEOUS
Status: DISCONTINUED | OUTPATIENT
Start: 2019-10-02 | End: 2019-10-04

## 2019-10-02 RX ORDER — MORPHINE SULFATE 4 MG/ML
4 INJECTION, SOLUTION INTRAMUSCULAR; INTRAVENOUS ONCE
Status: COMPLETED | OUTPATIENT
Start: 2019-10-02 | End: 2019-10-02

## 2019-10-02 RX ORDER — METHYLPREDNISOLONE 4 MG/1
4 TABLET ORAL
Status: DISCONTINUED | OUTPATIENT
Start: 2019-10-03 | End: 2019-10-02

## 2019-10-02 RX ORDER — HYDRALAZINE HYDROCHLORIDE 20 MG/ML
10 INJECTION INTRAMUSCULAR; INTRAVENOUS EVERY 4 HOURS PRN
Status: DISCONTINUED | OUTPATIENT
Start: 2019-10-02 | End: 2019-10-04

## 2019-10-02 RX ORDER — POLYETHYLENE GLYCOL 3350 17 G/17G
17 POWDER, FOR SOLUTION ORAL DAILY PRN
Status: DISCONTINUED | OUTPATIENT
Start: 2019-10-02 | End: 2019-10-06 | Stop reason: HOSPADM

## 2019-10-02 RX ORDER — METHYLPREDNISOLONE 4 MG/1
8 TABLET ORAL AT BEDTIME
Status: COMPLETED | OUTPATIENT
Start: 2019-10-02 | End: 2019-10-03

## 2019-10-02 RX ORDER — PROCHLORPERAZINE MALEATE 10 MG
10 TABLET ORAL EVERY 6 HOURS PRN
Status: DISCONTINUED | OUTPATIENT
Start: 2019-10-02 | End: 2019-10-06 | Stop reason: HOSPADM

## 2019-10-02 RX ORDER — METHYLPREDNISOLONE 4 MG/1
8 TABLET ORAL AT BEDTIME
Status: DISCONTINUED | OUTPATIENT
Start: 2019-10-02 | End: 2019-10-02

## 2019-10-02 RX ORDER — OXYCODONE AND ACETAMINOPHEN 5; 325 MG/1; MG/1
1 TABLET ORAL ONCE
Status: COMPLETED | OUTPATIENT
Start: 2019-10-02 | End: 2019-10-02

## 2019-10-02 RX ORDER — PROCHLORPERAZINE 25 MG
25 SUPPOSITORY, RECTAL RECTAL EVERY 12 HOURS PRN
Status: DISCONTINUED | OUTPATIENT
Start: 2019-10-02 | End: 2019-10-06 | Stop reason: HOSPADM

## 2019-10-02 RX ORDER — METHOCARBAMOL 500 MG/1
1000 TABLET, FILM COATED ORAL ONCE
Status: COMPLETED | OUTPATIENT
Start: 2019-10-02 | End: 2019-10-02

## 2019-10-02 RX ORDER — METHYLPREDNISOLONE 4 MG/1
8 TABLET ORAL ONCE
Status: COMPLETED | OUTPATIENT
Start: 2019-10-02 | End: 2019-10-02

## 2019-10-02 RX ORDER — AMOXICILLIN 250 MG
1 CAPSULE ORAL 2 TIMES DAILY
Status: DISCONTINUED | OUTPATIENT
Start: 2019-10-02 | End: 2019-10-04

## 2019-10-02 RX ORDER — LIDOCAINE 40 MG/G
CREAM TOPICAL
Status: DISCONTINUED | OUTPATIENT
Start: 2019-10-02 | End: 2019-10-06 | Stop reason: HOSPADM

## 2019-10-02 RX ORDER — AMOXICILLIN 250 MG
2 CAPSULE ORAL 2 TIMES DAILY
Status: DISCONTINUED | OUTPATIENT
Start: 2019-10-02 | End: 2019-10-04

## 2019-10-02 RX ORDER — ONDANSETRON 2 MG/ML
4 INJECTION INTRAMUSCULAR; INTRAVENOUS ONCE
Status: COMPLETED | OUTPATIENT
Start: 2019-10-02 | End: 2019-10-02

## 2019-10-02 RX ADMIN — HYDROMORPHONE HYDROCHLORIDE 0.5 MG: 1 INJECTION, SOLUTION INTRAMUSCULAR; INTRAVENOUS; SUBCUTANEOUS at 15:08

## 2019-10-02 RX ADMIN — SODIUM CHLORIDE 1000 ML: 9 INJECTION, SOLUTION INTRAVENOUS at 14:45

## 2019-10-02 RX ADMIN — DEXAMETHASONE SODIUM PHOSPHATE 10 MG: 10 INJECTION, SOLUTION INTRAMUSCULAR; INTRAVENOUS at 17:01

## 2019-10-02 RX ADMIN — SENNOSIDES AND DOCUSATE SODIUM 2 TABLET: 8.6; 5 TABLET ORAL at 22:02

## 2019-10-02 RX ADMIN — OXYCODONE HYDROCHLORIDE AND ACETAMINOPHEN 1 TABLET: 5; 325 TABLET ORAL at 15:55

## 2019-10-02 RX ADMIN — METHYLPREDNISOLONE 8 MG: 4 TABLET ORAL at 22:02

## 2019-10-02 RX ADMIN — LIDOCAINE 2 PATCH: 560 PATCH PERCUTANEOUS; TOPICAL; TRANSDERMAL at 17:56

## 2019-10-02 RX ADMIN — HYDROMORPHONE HYDROCHLORIDE 0.5 MG: 1 INJECTION, SOLUTION INTRAMUSCULAR; INTRAVENOUS; SUBCUTANEOUS at 15:55

## 2019-10-02 RX ADMIN — HYDROMORPHONE HYDROCHLORIDE 0.5 MG: 1 INJECTION, SOLUTION INTRAMUSCULAR; INTRAVENOUS; SUBCUTANEOUS at 17:46

## 2019-10-02 RX ADMIN — METHYLPREDNISOLONE 8 MG: 4 TABLET ORAL at 19:07

## 2019-10-02 RX ADMIN — METHOCARBAMOL 1000 MG: 500 TABLET, FILM COATED ORAL at 15:08

## 2019-10-02 RX ADMIN — MORPHINE SULFATE 4 MG: 4 INJECTION INTRAVENOUS at 14:43

## 2019-10-02 RX ADMIN — METHYLPREDNISOLONE 4 MG: 4 TABLET ORAL at 19:07

## 2019-10-02 RX ADMIN — ONDANSETRON 4 MG: 2 INJECTION INTRAMUSCULAR; INTRAVENOUS at 14:43

## 2019-10-02 ASSESSMENT — ENCOUNTER SYMPTOMS
BACK PAIN: 1
CHILLS: 0
ABDOMINAL PAIN: 0
FEVER: 0
WOUND: 0
ROS GI COMMENTS: NO LOSS OF BOWEL
NUMBNESS: 1
WEAKNESS: 0
DYSURIA: 0

## 2019-10-02 ASSESSMENT — ACTIVITIES OF DAILY LIVING (ADL): ADLS_ACUITY_SCORE: 21

## 2019-10-02 ASSESSMENT — MIFFLIN-ST. JEOR: SCORE: 1586.44

## 2019-10-02 NOTE — PROVIDER NOTIFICATION
Page sent to Dr. Medina: Just wanted to clarify that you wanted patient to start Medrol dose pack tonight? He did get 10 mg of IV dexamethasone in ED.     Update: Dr. Collier's note indicates he does want to start medrol dose pack s/p IV dexamethasone.

## 2019-10-02 NOTE — H&P
[facility]    History and Physical  Hospitalist       Date of Admission:  10/2/2019    Assessment & Plan   Shelia Ortiz is a 64 year old male hypertension not taking any medication for blood pressure history of back pain who presented with acute on chronic back pain with underlying CNS tumor admitted for pain management.  Patient does have a back surgery scheduled with Dr. Simon in two days, but due to severity of pain presented to ED .        1.  Acute on chronic back pain secondary to underlying CNS tumor:    MRI 8/29/19: There is a 2.6 x 1.1 cm intradural, extra medullary, peripherally enhancing cystic mass approximately at the level of L1-2. There is an additional enhancing mass at the level of the right S2 neural foramen and a subcentimeter enhancing nodule immediately superior to the index mass. 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 Recommend additional neurologic imaging workup and/or whole-body metastatic disease workup.    Continue with pain management will get spine surgery consultation    Physical therapy evaluation    denies focal weakness however report worsening numbness mainly in left thigh , problem with urination 2/2 pain     Spine sx consult     Start medrol pack , pt s/p 10 mg IV in ED     Avoid aspirin and nonsteroidal with expecting surgical intervention with spine surgery    2.  Hypertension patient does not take any medication at home for hypertension history of high blood pressure we will start patient on hydralazine as needed uncontrolled at this time likely secondary to pain        DVT Prophylaxis: Low Risk/Ambulatory with no VTE prophylaxis indicated  Code Status: Full Code        Madi Collier MD    Primary Care Physician   *Georgia Burleson    Chief Complaint   Back pain     History is obtained from the patient    History of Present Illness   Shelia Ortiz is a 64 year  old male, with history of elevated blood pressures, hyperlipidemia, chronic left sided low back pain with left sided sciatica, who presents with spouse for evaluation of worsening atraumatic low back pain that worsened over the last night. He has been taking Tramadol and Norco with little relief. His last dose of [place] was at 1300. Patient does have a back surgery scheduled with Dr. Simon in two days, but due to severity of pain, he called Dr. Simon's nurse prior to arrival who recommended he present.    Here, he notes he has a surgery scheduled to remove a known schwannoma after being diagnosed on MRI after 5 months of back pain. He also reports some worsening numbness, but denies any bowel/bladder incontinence or weakness.    Past Medical History    Past Medical History:   Diagnosis Date     Hyperlipidemia LDL goal <130      Sebaceous cyst 3/20/08    left posterior shoulder       Past Surgical History   Past Surgical History     Prior to Admission Medications   Prior to Admission Medications   Prescriptions Last Dose Informant Patient Reported? Taking?   HYDROcodone-acetaminophen (NORCO) 5-325 MG tablet   No No   Sig: Take 1-2 tablets by mouth every 6 hours as needed for pain   calcium carbonate-vitamin D (OS-JACQUE) 500-400 MG-UNIT tablet   Yes No   Sig: Take 1 tablet by mouth daily   diclofenac (VOLTAREN) 75 MG EC tablet   No No   Sig: Take 1 tablet (75 mg) by mouth 2 times daily   lidocaine (LIDODERM) 5 % patch   No No   Sig: Place 1 patch onto the skin every 24 hours To prevent lidocaine toxicity, patient should be patch free for 12 hrs daily.   traMADol (ULTRAM) 50 MG tablet   No No   Sig: Take 1 tablet (50 mg) by mouth every 6 hours as needed for severe pain   vitamin D3 (CHOLECALCIFEROL) 2000 units tablet   Yes No   Sig: Take 1 tablet by mouth daily      Facility-Administered Medications: None     Allergies   No Known Allergies    Social History   Shelia Ortiz  reports that he has never smoked. He has never used  smokeless tobacco. He reports that he does not drink alcohol or use drugs.    Family History   Shelia Ortiz family history includes Cancer - colorectal in his father; Cerebrovascular Disease in his mother; Diabetes in his mother; Hypertension in his brother, father, and mother.    Review of Systems   The 10 point Review of Systems is negative other than noted in the HPI or here.     Physical Exam   Temp: 98.8  F (37.1  C) Temp src: Oral BP: (!) 147/91 Pulse: 86 Heart Rate: 98 Resp: 16 SpO2: 99 % O2 Device: None (Room air)    Vital Signs with Ranges  Temp:  [98.8  F (37.1  C)] 98.8  F (37.1  C)  Pulse:  [79-86] 86  Heart Rate:  [98] 98  Resp:  [16] 16  BP: (147-202)/() 147/91  SpO2:  [97 %-99 %] 99 %  172 lbs 0 oz    Constitutional: Awake, alert, mild distress 2/2 pain   Eyes: Conjunctiva and pupils examined and normal.  HEENT: Moist mucous membranes, normal dentition.  Respiratory: Clear to auscultation bilaterally, no crackles or wheezing.  Cardiovascular: ,normal S1 and S2, and no murmur noted.  GI: Soft, non-distended, non-tender, normal bowel sounds.  Skin: No rashes, no cyanosis, no edema.  Neurologic: AXOX3 , no focal weakness in lower limbs power 5/5 with some limit 2/2 pain       Data   Data reviewed today:  I personally reviewed no images or EKG's today.  No lab results found in last 7 days.    Imaging:  No results found for this or any previous visit (from the past 24 hour(s)).

## 2019-10-02 NOTE — PROGRESS NOTES
RECEIVING UNIT ED HANDOFF REVIEW    ED Nurse Handoff Report was reviewed by: Melanie Schwartz RN on October 2, 2019 at 4:23 PM

## 2019-10-02 NOTE — PHARMACY-ADMISSION MEDICATION HISTORY
Admission medication history interview status for the 10/2/2019  admission is complete. See EPIC admission navigator for prior to admission medications     Medication history source reliability:Good    Actions taken by pharmacist (provider contacted, etc):None     Additional medication history information not noted on PTA med list : Deleted Lidocaine patch because he never started it 2 weeks ago and didn't want it.    Medication reconciliation/reorder completed by provider prior to medication history? No    Time spent in this activity: 10 min    Prior to Admission medications    Medication Sig Last Dose Taking? Auth Provider   HYDROcodone-acetaminophen (NORCO) 5-325 MG tablet Take 1-2 tablets by mouth every 6 hours as needed for pain  Yes Jannet Mijares MD   traMADol (ULTRAM) 50 MG tablet Take 1 tablet (50 mg) by mouth every 6 hours as needed for severe pain  Yes Jannet Mijares MD   diclofenac (VOLTAREN) 75 MG EC tablet Take 1 tablet (75 mg) by mouth 2 times daily   Janent Mijares MD

## 2019-10-02 NOTE — PROVIDER NOTIFICATION
"Paged Dr. Arreola, \"Is pt having sx w/ Dr. Mittal? If so will you put in neurosurgery consult instead of spine so they can round. Pt has saleh from ED, could you put in order for it?\"  "

## 2019-10-02 NOTE — ED PROVIDER NOTES
"  History     Chief Complaint:  Back Pain    HPI   Shelia Ortiz is a 64 year old male, with history of elevated blood pressures, hyperlipidemia, chronic left sided low back pain with left sided sciatica, who presents with spouse for evaluation of worsening atraumatic low back pain that worsened over the last night. He has been taking Tramadol and Norco with little relief. His last dose of [place] was at 1300. Patient does have a back surgery scheduled with Dr. Mittal in two days, but due to severity of pain, he called Dr. Mittal's nurse prior to arrival who recommended he present.     Here, he notes he has a surgery scheduled to remove a known schwannoma after being diagnosed on MRI after 5 months of back pain. He also reports some worsening numbness, but denies any bowel/bladder incontinence or weakness.    Allergies:  No Known Drug Allergies     Medications:    Voltaren  Norco  Lidoderm 5% patch  Tramadol    Past Medical History:    Hyperlipidemia  Sebaceous cyst  Elevated blood pressure reading without diagnosis of hypertension  Chronic left sided low back pain with left-sided sciatica    Past Surgical History:    Colonoscopy x2    Family History:    Mother - hypertension, stroke, diabetes, cerebrovascular disease  Father - hypertension, colorectal cancer  Brother - hypertension    Social History:  The patient was accompanied to the ED by spouse.  Smoking Status: No  Smokeless Tobacco: No  Alcohol Use: No  Drug Use: No   Marital Status:   [2]     Review of Systems   Constitutional: Negative for chills and fever.   Gastrointestinal: Negative for abdominal pain.        No loss of bowel   Genitourinary: Negative for dysuria.        No urinary incontinence   Musculoskeletal: Positive for back pain.   Skin: Negative for rash and wound.   Neurological: Positive for numbness (\"no change from previous\"). Negative for weakness.   All other systems reviewed and are negative.      Physical Exam   Vitals:  Patient Vitals for " "the past 24 hrs:   BP Temp Temp src Pulse Heart Rate Resp SpO2 Height Weight   10/02/19 1500 -- -- -- 86 -- -- 99 % -- --   10/02/19 1455 -- -- -- -- -- -- 97 % -- --   10/02/19 1440 (!) 147/91 -- -- 79 -- -- -- -- --   10/02/19 1435 (!) 149/95 -- -- -- -- -- -- -- --   10/02/19 1428 (!) 202/126 98.8  F (37.1  C) Oral -- 98 16 97 % 1.778 m (5' 10\") 78 kg (172 lb)      Physical Exam  Nursing notes reviewed. Vitals reviewed.  General: Alert.  Moderate discomfort . Well kept.  HENT: Normal voice.   Neck: non-tender. Full ROM, no bony deformity, step-off, or crepitus. No obvious paracervical muscle spasm.  Eyes: Sclera and conjunctiva normal  Pulmonary: Normal respiratory effort. No cough.    Musculoskeletal: Normal gross range of motion of all 4 extremities. No spinal tenderness, deformity, step-off, or crepitus.  Lower back tenderness worse on the left.  Symmetrical bilateral lower extremity strength and sensation.  Low back pain with minimal movement.  Neurological: Alert. Normal speech. Responds appropriately. Normal sensation and strength distally.  Skin: Warm and dry. Normal appearance of visualized exposed skin.  Psych: Affect normal. Normal personal interaction. Good eye contact.    Emergency Department Course     Laboratory:  Laboratory findings were discussed with patient and spouse, who voiced understanding of the findings:  UA Reflex to Microscopic and Culture (ED Lab POCT Only): AWNL    Interventions:  1443 Morphine 4 mg IV  1443 Zofran 4 mg IV  1445 0.9% NaCl Bolus 1000 mL IV  1508 Dilaudid 0.5 mg IV  1508 Robaxin 1000 mg PO  1555 Dilaudid 0.5 mg IV  1555 Percocet 5-325 mg per tablet 1 tablet PO  1701 Decadron 10 mg IV  Lidocaine 4% Patch 2 patches transdermal - ordered     Emergency Department Course:  Nursing notes and vitals reviewed.  The patient provided a urine sample here in the emergency department. This was sent for laboratory testing, findings above.    (1054)   I performed an exam of the patient " as documented above. History obtained from patient. Reports no improvement with the morphine.     (1547)   Rechecked patient and patient states that he feels only somewhat improved, despite the above interventions.     (0994)   I spoke with Dr. Arreola of the Hospitalist service regarding patient's presentation, findings, and plan of care, who accepts patient for further care.    Findings and plan explained to the Patient and spouse who consents to admission. Discussed the patient with Dr. Arreola, who will admit the patient to an inpatient bed for further monitoring, evaluation, and treatment. I personally answered all related questions prior to admission.    Impression & Plan      Medical Decision Making:  Shelia Ortiz is a 64 year old male who presents today for evaluation of uncontrolled low back pain.  He has scheduled surgery in 2 days time for tumors in his low back.  He has been taken off his Voltaren in preparation for this and as a result has developed uncontrolled back pain.  He denies any new neurologic changes.  He has some paresthesia in his left knee however this is unchanged.  He denies any difficulty urinating however he states that it is painful to move and therefore has a difficult time urinating.  He has had no new falls.  There is no loss of bowel or bladder control.  No indication for advanced imagery at this time.  Patient was given the above medication with moderate relief of his symptoms.  He is still unable to move without significant discomfort therefore the plan will be to admit patient to medical surgical unit for pain management until scheduled surgery in 2 days time.  Spoke to hospitalist who did accept patient to his service.      Diagnosis:    ICD-10-CM    1. Bilateral low back pain with left-sided sciatica M54.42         Disposition:   Admission.    Scribe Disclosure:  Nelia HALEY, am serving as a scribe at 2:43 PM on 10/2/2019 to document services personally performed by Lux  James Herman, IMMANUEL*, based on my observations and the provider's statements to me.  10/2/2019    EMERGENCY DEPARTMENT       James Wasserman, APRN CNP  10/02/19 [id number]

## 2019-10-02 NOTE — TELEPHONE ENCOUNTER
Patient called in regards to pain. He is scheduled for a L1-2 laminectomy and resection of intradural mass with Dr. Mittal on 10/4. He states that starting this morning he started having increased pain to the point where he states he can't move. The pain is sharp and shooting in his lower back, radiating to his buttocks and L leg, his L leg is also newly numb as of this morning. No weakness or bowel/bladder incontinence reported. He is taking Norco for pain, he took 1 tablet at 11am so encouraged him to take another one as his Rx is for 1-2 tablets q6h. Offered him that we could get him a stronger pain medication or a muscle relaxer but patient declined both. Patient is insistent about going to the ER, encouraged him to come to [facility] and patient agreed and will have wife drive him. Will route message to Dr. Mittal to update on patient status.

## 2019-10-02 NOTE — ED TRIAGE NOTES
Pt scheduled for back surgery on Friday with Dr. Simon, but pain was too bad today, Alfred's RN directed pt to ED.

## 2019-10-02 NOTE — ED NOTES
"[facility]  ED Nurse Handoff Report    ED Chief complaint: Back Pain      ED Diagnosis:   Final diagnoses:   Bilateral low back pain with left-sided sciatica       Code Status: Full Code    Allergies: No Known Allergies    Activity level - Baseline/Home:  Stand with Assist  Activity Level - Current:   Stand with Assist    Patient's Preferred language: english   Needed?: No    Isolation: No  Infection: Not Applicable  Bariatric?: No    Vital Signs:   Vitals:    10/02/19 1435 10/02/19 1440 10/02/19 1455 10/02/19 1500   BP: (!) 149/95 (!) 147/91     Pulse:  79  86   Resp:       Temp:       TempSrc:       SpO2:   97% 99%   Weight:       Height:           Cardiac Rhythm: ,        Pain level:      Is this patient confused?: No   Does this patient have a guardian?  No         If yes, is there guardianship documents in the Epic \"Code/ACP\" activity?  N/A         Guardian Notified?  N/A  [place] - Suicide Severity Rating Scale Completed?  Yes  If yes, what color did the patient score?  White    Patient Report: Javier comes to the ER for pain control. He's scheduled to have back surgery (tumor removed) on Friday. He was taken off his voltaren for surgery and he says his pain has gotten worse since. He is also taking norco with no relief. He's received morphine, dilaudid, robaxin, percocet and lidocaine patches. VSS.    Family Comments: wife at bedside    OBS brochure/video discussed/provided to patient/family: N/A              Name of person given brochure if not patient: n/a              Relationship to patient: n/a    ED Medications:   Medications   Lidocaine (LIDOCARE) 4 % Patch 2 patch (has no administration in time range)   oxyCODONE-acetaminophen (PERCOCET) 5-325 MG per tablet 1 tablet (has no administration in time range)   HYDROmorphone (PF) (DILAUDID) injection 0.5 mg (has no administration in time range)   morphine (PF) injection 4 mg (4 mg Intravenous Given 10/2/19 1443)   ondansetron " (ZOFRAN) injection 4 mg (4 mg Intravenous Given 10/2/19 1443)   HYDROmorphone (PF) (DILAUDID) injection 0.5 mg (0.5 mg Intravenous Given 10/2/19 1508)   methocarbamol (ROBAXIN) tablet 1,000 mg (1,000 mg Oral Given 10/2/19 1508)   0.9% sodium chloride BOLUS (0 mLs Intravenous Stopped 10/2/19 1508)       Drips infusing?:  No    For the majority of the shift this patient was Green.   Interventions performed were n/a.    Severe Sepsis OR Septic Shock Diagnosis Present: No    To be done/followed up on inpatient unit:  pain control    ED NURSE PHONE NUMBER: [phone]

## 2019-10-03 LAB
ANION GAP SERPL CALCULATED.3IONS-SCNC: 5 MMOL/L (ref 3–14)
BUN SERPL-MCNC: 17 MG/DL (ref 7–30)
CALCIUM SERPL-MCNC: 9.4 MG/DL (ref 8.5–10.1)
CHLORIDE SERPL-SCNC: 103 MMOL/L (ref 94–109)
CO2 SERPL-SCNC: 26 MMOL/L (ref 20–32)
CREAT SERPL-MCNC: 0.9 MG/DL (ref 0.66–1.25)
ERYTHROCYTE [DISTWIDTH] IN BLOOD BY AUTOMATED COUNT: 12.9 % (ref 10–15)
GFR SERPL CREATININE-BSD FRML MDRD: >90 ML/MIN/{1.73_M2}
GLUCOSE SERPL-MCNC: 162 MG/DL (ref 70–99)
HCT VFR BLD AUTO: 42.4 % (ref 40–53)
HGB BLD-MCNC: 14.5 G/DL (ref 13.3–17.7)
MCH RBC QN AUTO: 31 PG (ref 26.5–33)
MCHC RBC AUTO-ENTMCNC: 34.2 G/DL (ref 31.5–36.5)
MCV RBC AUTO: 91 FL (ref 78–100)
PLATELET # BLD AUTO: 250 10E9/L (ref 150–450)
POTASSIUM SERPL-SCNC: 4 MMOL/L (ref 3.4–5.3)
RBC # BLD AUTO: 4.68 10E12/L (ref 4.4–5.9)
SODIUM SERPL-SCNC: 134 MMOL/L (ref 133–144)
WBC # BLD AUTO: 7.4 10E9/L (ref 4–11)

## 2019-10-03 PROCEDURE — 25000132 ZZH RX MED GY IP 250 OP 250 PS 637: Performed by: HOSPITALIST

## 2019-10-03 PROCEDURE — 36415 COLL VENOUS BLD VENIPUNCTURE: CPT | Performed by: HOSPITALIST

## 2019-10-03 PROCEDURE — 99221 1ST HOSP IP/OBS SF/LOW 40: CPT | Performed by: PHYSICIAN ASSISTANT

## 2019-10-03 PROCEDURE — 25000128 H RX IP 250 OP 636: Performed by: HOSPITALIST

## 2019-10-03 PROCEDURE — 25000132 ZZH RX MED GY IP 250 OP 250 PS 637: Performed by: INTERNAL MEDICINE

## 2019-10-03 PROCEDURE — 25000131 ZZH RX MED GY IP 250 OP 636 PS 637: Performed by: HOSPITALIST

## 2019-10-03 PROCEDURE — 99232 SBSQ HOSP IP/OBS MODERATE 35: CPT | Performed by: INTERNAL MEDICINE

## 2019-10-03 PROCEDURE — 12000000 ZZH R&B MED SURG/OB

## 2019-10-03 PROCEDURE — 85027 COMPLETE CBC AUTOMATED: CPT | Performed by: HOSPITALIST

## 2019-10-03 PROCEDURE — 80048 BASIC METABOLIC PNL TOTAL CA: CPT | Performed by: HOSPITALIST

## 2019-10-03 RX ORDER — GABAPENTIN 300 MG/1
300 CAPSULE ORAL
Status: CANCELLED | OUTPATIENT
Start: 2019-10-03

## 2019-10-03 RX ORDER — CEFAZOLIN SODIUM 1 G/3ML
1 INJECTION, POWDER, FOR SOLUTION INTRAMUSCULAR; INTRAVENOUS SEE ADMIN INSTRUCTIONS
Status: CANCELLED | OUTPATIENT
Start: 2019-10-03

## 2019-10-03 RX ORDER — ACETAMINOPHEN 325 MG/1
975 TABLET ORAL ONCE
Status: CANCELLED | OUTPATIENT
Start: 2019-10-03 | End: 2019-10-03

## 2019-10-03 RX ORDER — CEFAZOLIN SODIUM 2 G/100ML
2 INJECTION, SOLUTION INTRAVENOUS
Status: CANCELLED | OUTPATIENT
Start: 2019-10-03

## 2019-10-03 RX ADMIN — HYDROMORPHONE HYDROCHLORIDE 0.5 MG: 1 INJECTION, SOLUTION INTRAMUSCULAR; INTRAVENOUS; SUBCUTANEOUS at 10:36

## 2019-10-03 RX ADMIN — ACETAMINOPHEN 650 MG: 325 TABLET, FILM COATED ORAL at 22:21

## 2019-10-03 RX ADMIN — METHYLPREDNISOLONE 4 MG: 4 TABLET ORAL at 12:49

## 2019-10-03 RX ADMIN — SENNOSIDES AND DOCUSATE SODIUM 2 TABLET: 8.6; 5 TABLET ORAL at 09:09

## 2019-10-03 RX ADMIN — HYDROMORPHONE HYDROCHLORIDE 0.5 MG: 1 INJECTION, SOLUTION INTRAMUSCULAR; INTRAVENOUS; SUBCUTANEOUS at 20:11

## 2019-10-03 RX ADMIN — HYDROMORPHONE HYDROCHLORIDE 0.5 MG: 1 INJECTION, SOLUTION INTRAMUSCULAR; INTRAVENOUS; SUBCUTANEOUS at 18:09

## 2019-10-03 RX ADMIN — HYDROMORPHONE HYDROCHLORIDE 0.5 MG: 1 INJECTION, SOLUTION INTRAMUSCULAR; INTRAVENOUS; SUBCUTANEOUS at 15:59

## 2019-10-03 RX ADMIN — METHYLPREDNISOLONE 8 MG: 4 TABLET ORAL at 21:44

## 2019-10-03 RX ADMIN — RANITIDINE 150 MG: 150 TABLET ORAL at 10:38

## 2019-10-03 RX ADMIN — METHYLPREDNISOLONE 4 MG: 4 TABLET ORAL at 18:09

## 2019-10-03 RX ADMIN — HYDROMORPHONE HYDROCHLORIDE 0.5 MG: 1 INJECTION, SOLUTION INTRAMUSCULAR; INTRAVENOUS; SUBCUTANEOUS at 09:08

## 2019-10-03 RX ADMIN — HYDROMORPHONE HYDROCHLORIDE 0.5 MG: 1 INJECTION, SOLUTION INTRAMUSCULAR; INTRAVENOUS; SUBCUTANEOUS at 22:17

## 2019-10-03 RX ADMIN — HYDROMORPHONE HYDROCHLORIDE 0.5 MG: 1 INJECTION, SOLUTION INTRAMUSCULAR; INTRAVENOUS; SUBCUTANEOUS at 05:33

## 2019-10-03 RX ADMIN — RANITIDINE 150 MG: 150 TABLET ORAL at 20:12

## 2019-10-03 RX ADMIN — METHYLPREDNISOLONE 4 MG: 4 TABLET ORAL at 06:57

## 2019-10-03 ASSESSMENT — ACTIVITIES OF DAILY LIVING (ADL)
ADLS_ACUITY_SCORE: 21

## 2019-10-03 NOTE — PROGRESS NOTES
Orthopaedic Spine Consult - Dictated   I received an orthopedic spine consult request of for this patient.  This patient is a patient of Dr. Mittal's, Dr. Mittal has him scheduled for surgery in 2 days.  Please advise Dr. Mittal that the patient has been admitted, I think both he and the patient was appreciate continuity of care from their team.  Please reconsult orthospine if Dr. Mittal desires our input.

## 2019-10-03 NOTE — PLAN OF CARE
Here for surgery with Dr. Mittal on Friday. A&Ox4. CMS intact. Regular diet. VSS. Quick in place. Up with A2/GB/pivot. C/o minimal pain while laying in bed, does not want pain meds overnight. Pt scoring green on the Aggression Stop Light Tool.Slept well. Plan for surgery Friday. Continue to monitor.

## 2019-10-03 NOTE — PLAN OF CARE
A/O x4. CMS intact. Severe low back/left hip pain w/ movement, IV Dilaudid given x1 with some relief, declined further intervention. Lido patches intact. Ax2 pivot into bed. Quick patent with adequate output. Regular diet, poor appetite. Awaiting neurosurgery consult. Surgery already scheduled for Friday.

## 2019-10-03 NOTE — CONSULTS
[facility]    Neurosurgery Consultation     Date of Admission:  10/2/2019  Date of Consult (When I saw the patient): 10/03/19    Assessment & Plan   Shelia Ortiz is a 64 year old male who was admitted on 10/2/2019. I was asked to see the patient for back pain.    Active Problems:    Back pain    Assessment: Known patient of Dr. 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  He had presented to ER with uncontrolled back pain.  He had been taking Voltaren, which was very helpful in managing his pain, but did have to stop due to presurgical protocol.  His back pain became significant enough where he presented to the ER for management.    Plan: He was started on some IV pain medication, and states that while he is lying in bed, his pain is 1 out of 10.  We will work on keeping him comfortable today, and plan for surgical resection tomorrow.      I have discussed the following assessment and plan with Dr. Mittal, who is in agreement with the initial plan and will follow up with further consultation recommendations.    Reynold Astudillo PA-C  [facility]  [place] [address]    Tel [phone]  Pager [phone]        Code Status    Full Code    Reason for Consult   Reason for consult: Back pain    Primary Care Physician   Georgia Burleson    Chief Complaint   Back pain    History is obtained from the patient and electronic health record    History of Present Illness   Shelia Ortiz is a 64 year old male who presents with a very recent and acute flare of low back pain.  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  His pain had typically been managed with oral Voltaren, but due to surgical planning he had stopped this.  He states that his pain became so severe that he was advised to present to the ER.  He continues to note  severe low back pain, with a lot of aching pain in his legs as well.  He was also started on a muscle relaxer.  Now that he is at rest, he states his pain is 1 out of 10.    Past Medical History   I have reviewed this patient's medical history and updated it with pertinent information if needed.   Past Medical History:   Diagnosis Date     Hyperlipidemia LDL goal <130      Sebaceous cyst 3/20/08    left posterior shoulder       Past Surgical History   I have reviewed this patient's surgical history and updated it with pertinent information if needed.  Past Surgical History:   Procedure Laterality Date     COLONOSCOPY  10/24/2013    Procedure: COLONOSCOPY;  Tubular adenoma of colon  pkt sent 10/7  rx sent  ;  Surgeon: Lenin Lopez MD;  Location: [facility] OR     COLONOSCOPY WITH CO2 INSUFFLATION N/A 1/29/2019    Procedure: COLONOSCOPY WITH CO2 INSUFFLATION;  Surgeon: Kaleb Mroelos MD;  Location: [facility] OR     NO HISTORY OF SURGERY         Prior to Admission Medications   Prior to Admission Medications   Prescriptions Last Dose Informant Patient Reported? Taking?   HYDROcodone-acetaminophen (NORCO) 5-325 MG tablet  Self No Yes   Sig: Take 1-2 tablets by mouth every 6 hours as needed for pain   diclofenac (VOLTAREN) 75 MG EC tablet  Self No No   Sig: Take 1 tablet (75 mg) by mouth 2 times daily   traMADol (ULTRAM) 50 MG tablet  Self No Yes   Sig: Take 1 tablet (50 mg) by mouth every 6 hours as needed for severe pain      Facility-Administered Medications: None     Allergies   No Known Allergies    Social History   I have reviewed this patient's social history and updated it with pertinent information if needed. Shelia Ortiz  reports that he has never smoked. He has never used smokeless tobacco. He reports that he does not drink alcohol or use drugs.    Family History   I have reviewed this patient's family history and updated it with pertinent information if needed.   Family History   Problem Relation Age of  "Onset     Hypertension Mother          age 86-stroke      Diabetes Mother         developed in her 70s, now age 77 or 78     Cerebrovascular Disease Mother      Hypertension Father      Cancer - colorectal Father         age 74 or 75     Hypertension Brother      Asthma No family hx of      C.A.D. No family hx of      Breast Cancer No family hx of      Prostate Cancer No family hx of      Thyroid Disease No family hx of      Osteoporosis No family hx of        Review of Systems   10 point review of systems is negative with the exception of HPI and PMH.     Physical Exam   Temp: 98.1  F (36.7  C) Temp src: Oral BP: (!) 154/96 Pulse: 86 Heart Rate: 70 Resp: 18 SpO2: 95 % O2 Device: None (Room air)    Vital Signs with Ranges  Temp:  [68.1  F (20.1  C)-98.8  F (37.1  C)] 98.1  F (36.7  C)  Pulse:  [79-86] 86  Heart Rate:  [69-98] 70  Resp:  [12-18] 18  BP: (118-202)/() 154/96  SpO2:  [94 %-99 %] 95 %  172 lbs 0 oz    Heart Rate: 70, Blood pressure (!) 154/96, pulse 86, temperature 98.1  F (36.7  C), temperature source Oral, resp. rate 18, height 1.778 m (5' 10\"), weight 78 kg (172 lb), SpO2 95 %.  172 lbs 0 oz  HEENT:  Normocephalic, atraumatic.  PERRLA.  EOM s intact.   Neck:  Supple, non-tender, without lymphadenopathy.  Heart:  No peripheral edema  Lungs:  No SOB  Abdomen:  Soft, non-tender, non-distended.  Skin:  Warm and dry, good capillary refill.  Extremities:  Good radial and dorsalis pedis pulses bilaterally, no edema, cyanosis or clubbing.    NEUROLOGICAL EXAMINATION:     Mental status:  Alert and Oriented x 3, speech is fluent.  Cranial nerves:  II-XII intact.   Motor:  Strength is 5/5 throughout the upper and lower extremities   Hip Flexor:                Right: 5/5  Left:  5/5  Hip Adductor:             Right:  5/5  Left:  5/5  Hip Abductor:             Right:  5/5  Left:  5/5  Gastroc Soleus:        Right:  5/5  Left:  5/5  Tib/Ant:                      Right:  5/5  Left:  5/5  EHL:               "       Right:  5/5  Left:  5/5  Sensation:  intact  Reflexes:   Negative Babinski.  Negative Clonus.    Coordination:  Smooth finger to nose testing.   Negative pronator drift.      Data   All new lab and imaging data was personally reviewed by me.  CT:  MRI:8/29/19:  Impression:   1. There is a 2.6 x 1.1 cm intradural, extra medullary, peripherally  enhancing cystic mass approximately at the level of L1-2. There is an  additional enhancing mass at the level of the right S2 neural foramen  and a subcentimeter enhancing nodule immediately superior to the index  mass. Given this appearance, this is concerning for high-grade CNS  tumor with drop metastases versus metastatic disease from an unknown  primary. Recommend additional neurologic imaging workup and/or  whole-body metastatic disease workup.  2. Transitional anatomy with partially lumbarized S1.  3. Multiple benign vertebral body hemangiomas.    CBC RESULTS:   Recent Labs   Lab Test 10/03/19  0737   WBC 7.4   RBC 4.68   HGB 14.5   HCT 42.4   MCV 91   MCH 31.0   MCHC 34.2   RDW 12.9        Basic Metabolic Panel:  Lab Results   Component Value Date     10/03/2019      Lab Results   Component Value Date    POTASSIUM 4.0 10/03/2019     Lab Results   Component Value Date    CHLORIDE 103 10/03/2019     Lab Results   Component Value Date    JACQUE 9.4 10/03/2019     Lab Results   Component Value Date    CO2 26 10/03/2019     Lab Results   Component Value Date    BUN 17 10/03/2019     Lab Results   Component Value Date    CR 0.90 10/03/2019     Lab Results   Component Value Date     10/03/2019     INR:  No results found for: INR

## 2019-10-03 NOTE — PROGRESS NOTES
[facility]    Hospitalist Progress Note    Interval History   - Pain improved overnight, relatively well controlled this morning, requiring IV as needed. Reports intense lower back pain with most activity.  - Appetite remains poor, complains of reflux    Assessment & Plan   Summary: Shelia Ortiz is a 64 year old male with PMH HTN, CNS tumor who was admitted on 10/2/2019 with acute on chronic back pain. Planned surgery with Dr. Mittal on 10/4/2019.    Acute on chronic back pain secondary to underlying CNS tumor:  MRI 8/29/19 notes 2.6 x 1.1 cm intradural, extra medullary, peripherally enhancing cystic mass approximately at the level of L1-2. There is an additional enhancing mass at the level of the right S2 neural foramen and a subcentimeter enhancing nodule immediately superior to the index mass. 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 Planned surgery with Dr. Mittal on 10/4/2019.  - Continue pain control with dilaudid PO + IV PRN  - PT  - Continue medrol dosepak    Reflux: Ranitidine     Hypertension: Likely related to pain.  - Hydralazine IV PRN     DVT Prophylaxis: Pneumatic Compression Devices  Code Status: Full Code  PT/OT: ordered    Disposition: Expected discharge pending surgery    Moustapha Sanchez MD  Text Page  (7am to 6pm)  -Data reviewed today: I reviewed all new labs and imaging results over the last 24 hours.    Physical Exam   Temp: (!) 68.1  F (20.1  C) Temp src: Oral BP: (!) 154/96 Pulse: 86 Heart Rate: 70 Resp: 18 SpO2: 95 % O2 Device: None (Room air)    Vitals:    10/02/19 1428   Weight: 78 kg (172 lb)     Vital Signs with Ranges  Temp:  [68.1  F (20.1  C)-98.8  F (37.1  C)] 68.1  F (20.1  C)  Pulse:  [79-86] 86  Heart Rate:  [69-98] 70  Resp:  [12-18] 18  BP: (118-202)/() 154/96  SpO2:  [94 %-99 %] 95 %  I/O last 3 completed shifts:  In: 240 [P.O.:240]  Out: 7230  [Urine:2750]  O2 requirements: none    Constitutional: Male in NAD  HEENT: Eyes nonicteric, oral mucosa moist  Cardiovascular: RRR, normal S1/2, no m/r/g  Respiratory: CTAB, no wheezing or crackles  Vascular: No LE pitting edema  Skin/Integumen: No rashes  Neuro/Psych: Appropriate affect and mood. A&Ox3, moves all extremities    Medications       influenza vaccine adult (product based on age)  0.5 mL Intramuscular Prior to discharge     methylPREDNISolone  8 mg Oral At Bedtime    And     methylPREDNISolone  4 mg Oral QAM AC    And     methylPREDNISolone  4 mg Oral Daily with lunch    And     methylPREDNISolone  4 mg Oral Daily with supper    And     [START ON 10/4/2019] methylPREDNISolone  4 mg Oral At Bedtime     senna-docusate  1 tablet Oral BID    Or     senna-docusate  2 tablet Oral BID     sodium chloride (PF)  3 mL Intracatheter Q8H       Data   Recent Labs   Lab 10/03/19  0737   WBC 7.4   HGB 14.5   MCV 91         POTASSIUM 4.0   CHLORIDE 103   CO2 26   BUN 17   CR 0.90   ANIONGAP 5   JACQUE 9.4   *       Imaging:   No results found for this or any previous visit (from the past 24 hour(s)).

## 2019-10-03 NOTE — PROGRESS NOTES
[facility] Progress Note  [facility]    Visit with pt, per request in chart.  Pt was lying in bed, and said that he's in pain, and had just received some pain medication from his nurse.  Pt is anticipating surgery tomorrow for a tumor in his spine.  Provided reflective listening and support.  Pt has no specific needs at present, but is aware of  availability, should needs arise.                                                                                                                                                 Jina Hsu M.A.  Staff   Pager [phone]  Phone [phone]

## 2019-10-03 NOTE — PLAN OF CARE
Pt A&Ox4; VSS; CMS intact; no c/o numbness or tingling this shift. UP with assist of 1/gait belt/walker. C/o lower back pain mostly with activity, radiating to bilateral thighs/legs ( L>R). Pain managed with IV dilaudid. Tolerating PO but has poor appetite; encouragement provided. Denies any nausea or vomiting. Zantac started for c/o acid reflux. Quick patent/ with adequate urine output. NPO after midnight for surgery tomorrow AM. Spouse at bedside/ supportive; Will continue to monitor.

## 2019-10-04 ENCOUNTER — ANESTHESIA (OUTPATIENT)
Dept: SURGERY | Facility: CLINIC | Age: 64
DRG: 520 | End: 2019-10-04
Payer: COMMERCIAL

## 2019-10-04 ENCOUNTER — APPOINTMENT (OUTPATIENT)
Dept: GENERAL RADIOLOGY | Facility: CLINIC | Age: 64
DRG: 520 | End: 2019-10-04
Attending: NEUROLOGICAL SURGERY
Payer: COMMERCIAL

## 2019-10-04 ENCOUNTER — ANESTHESIA EVENT (OUTPATIENT)
Dept: SURGERY | Facility: CLINIC | Age: 64
DRG: 520 | End: 2019-10-04
Payer: COMMERCIAL

## 2019-10-04 PROBLEM — Z98.890 S/P LAMINECTOMY: Status: ACTIVE | Noted: 2019-10-04

## 2019-10-04 PROCEDURE — 25000301 ZZH OR RX SURGIFLO W/THROMBIN KIT 2ML 1991 OPNP: Performed by: NEUROLOGICAL SURGERY

## 2019-10-04 PROCEDURE — 88331 PATH CONSLTJ SURG 1 BLK 1SPC: CPT | Performed by: NEUROLOGICAL SURGERY

## 2019-10-04 PROCEDURE — 36000063 ZZH SURGERY LEVEL 4 EA 15 ADDTL MIN: Performed by: NEUROLOGICAL SURGERY

## 2019-10-04 PROCEDURE — 71000013 ZZH RECOVERY PHASE 1 LEVEL 1 EA ADDTL HR: Performed by: NEUROLOGICAL SURGERY

## 2019-10-04 PROCEDURE — 25000128 H RX IP 250 OP 636: Performed by: HOSPITALIST

## 2019-10-04 PROCEDURE — 25000132 ZZH RX MED GY IP 250 OP 250 PS 637: Performed by: PHYSICIAN ASSISTANT

## 2019-10-04 PROCEDURE — 37000008 ZZH ANESTHESIA TECHNICAL FEE, 1ST 30 MIN: Performed by: NEUROLOGICAL SURGERY

## 2019-10-04 PROCEDURE — 27211024 ZZHC OR SUPPLY OTHER OPNP: Performed by: NEUROLOGICAL SURGERY

## 2019-10-04 PROCEDURE — 4A11X4G MONITORING OF PERIPHERAL NERVOUS ELECTRICAL ACTIVITY, INTRAOPERATIVE, EXTERNAL APPROACH: ICD-10-PCS | Performed by: NEUROLOGICAL SURGERY

## 2019-10-04 PROCEDURE — 25000566 ZZH SEVOFLURANE, EA 15 MIN: Performed by: NEUROLOGICAL SURGERY

## 2019-10-04 PROCEDURE — 25000128 H RX IP 250 OP 636: Performed by: NEUROLOGICAL SURGERY

## 2019-10-04 PROCEDURE — 25800030 ZZH RX IP 258 OP 636: Performed by: ANESTHESIOLOGY

## 2019-10-04 PROCEDURE — 25000131 ZZH RX MED GY IP 250 OP 636 PS 637: Performed by: PHYSICIAN ASSISTANT

## 2019-10-04 PROCEDURE — 25000128 H RX IP 250 OP 636: Performed by: NURSE ANESTHETIST, CERTIFIED REGISTERED

## 2019-10-04 PROCEDURE — 95940 IONM IN OPERATNG ROOM 15 MIN: CPT | Performed by: NEUROLOGICAL SURGERY

## 2019-10-04 PROCEDURE — 69990 MICROSURGERY ADD-ON: CPT | Performed by: NEUROLOGICAL SURGERY

## 2019-10-04 PROCEDURE — 88331 PATH CONSLTJ SURG 1 BLK 1SPC: CPT | Mod: 26 | Performed by: NEUROLOGICAL SURGERY

## 2019-10-04 PROCEDURE — 25000128 H RX IP 250 OP 636: Performed by: ANESTHESIOLOGY

## 2019-10-04 PROCEDURE — 37000009 ZZH ANESTHESIA TECHNICAL FEE, EACH ADDTL 15 MIN: Performed by: NEUROLOGICAL SURGERY

## 2019-10-04 PROCEDURE — 25800030 ZZH RX IP 258 OP 636: Performed by: NURSE ANESTHETIST, CERTIFIED REGISTERED

## 2019-10-04 PROCEDURE — 63272 EXCISE INTRSPINL LESION LMBR: CPT | Performed by: NEUROLOGICAL SURGERY

## 2019-10-04 PROCEDURE — 25000128 H RX IP 250 OP 636: Performed by: PHYSICIAN ASSISTANT

## 2019-10-04 PROCEDURE — 71000012 ZZH RECOVERY PHASE 1 LEVEL 1 FIRST HR: Performed by: NEUROLOGICAL SURGERY

## 2019-10-04 PROCEDURE — 88342 IMHCHEM/IMCYTCHM 1ST ANTB: CPT | Performed by: NEUROLOGICAL SURGERY

## 2019-10-04 PROCEDURE — 88342 IMHCHEM/IMCYTCHM 1ST ANTB: CPT | Mod: 26 | Performed by: NEUROLOGICAL SURGERY

## 2019-10-04 PROCEDURE — 25000125 ZZHC RX 250: Performed by: NEUROLOGICAL SURGERY

## 2019-10-04 PROCEDURE — 25000125 ZZHC RX 250: Performed by: PHYSICIAN ASSISTANT

## 2019-10-04 PROCEDURE — 27211022 ZZHC OR IOM SUPPLIES OPNP: Performed by: NEUROLOGICAL SURGERY

## 2019-10-04 PROCEDURE — 40000170 ZZH STATISTIC PRE-PROCEDURE ASSESSMENT II: Performed by: NEUROLOGICAL SURGERY

## 2019-10-04 PROCEDURE — 00UT0KZ SUPPLEMENT SPINAL MENINGES WITH NONAUTOLOGOUS TISSUE SUBSTITUTE, OPEN APPROACH: ICD-10-PCS | Performed by: NEUROLOGICAL SURGERY

## 2019-10-04 PROCEDURE — 27210794 ZZH OR GENERAL SUPPLY STERILE: Performed by: NEUROLOGICAL SURGERY

## 2019-10-04 PROCEDURE — 40000277 XR SURGERY CARM FLUORO LESS THAN 5 MIN W STILLS

## 2019-10-04 PROCEDURE — C1763 CONN TISS, NON-HUMAN: HCPCS | Performed by: NEUROLOGICAL SURGERY

## 2019-10-04 PROCEDURE — 25000131 ZZH RX MED GY IP 250 OP 636 PS 637: Performed by: HOSPITALIST

## 2019-10-04 PROCEDURE — 25800030 ZZH RX IP 258 OP 636: Performed by: PHYSICIAN ASSISTANT

## 2019-10-04 PROCEDURE — 00BY0ZZ EXCISION OF LUMBAR SPINAL CORD, OPEN APPROACH: ICD-10-PCS | Performed by: NEUROLOGICAL SURGERY

## 2019-10-04 PROCEDURE — 88305 TISSUE EXAM BY PATHOLOGIST: CPT | Performed by: NEUROLOGICAL SURGERY

## 2019-10-04 PROCEDURE — 88305 TISSUE EXAM BY PATHOLOGIST: CPT | Mod: 26,59 | Performed by: NEUROLOGICAL SURGERY

## 2019-10-04 PROCEDURE — 63272 EXCISE INTRSPINL LESION LMBR: CPT | Mod: AS | Performed by: PHYSICIAN ASSISTANT

## 2019-10-04 PROCEDURE — 12000000 ZZH R&B MED SURG/OB

## 2019-10-04 PROCEDURE — 36000065 ZZH SURGERY LEVEL 4 W FLUORO 1ST 30 MIN: Performed by: NEUROLOGICAL SURGERY

## 2019-10-04 PROCEDURE — 25000125 ZZHC RX 250: Performed by: NURSE ANESTHETIST, CERTIFIED REGISTERED

## 2019-10-04 DEVICE — GRAFT DURAGEN 3X3" ID330: Type: IMPLANTABLE DEVICE | Site: SPINE LUMBAR | Status: FUNCTIONAL

## 2019-10-04 RX ORDER — METOCLOPRAMIDE HYDROCHLORIDE 5 MG/ML
10 INJECTION INTRAMUSCULAR; INTRAVENOUS EVERY 6 HOURS PRN
Status: DISCONTINUED | OUTPATIENT
Start: 2019-10-04 | End: 2019-10-06 | Stop reason: HOSPADM

## 2019-10-04 RX ORDER — ONDANSETRON 4 MG/1
4 TABLET, ORALLY DISINTEGRATING ORAL EVERY 6 HOURS PRN
Status: DISCONTINUED | OUTPATIENT
Start: 2019-10-04 | End: 2019-10-04

## 2019-10-04 RX ORDER — SODIUM CHLORIDE, SODIUM LACTATE, POTASSIUM CHLORIDE, CALCIUM CHLORIDE 600; 310; 30; 20 MG/100ML; MG/100ML; MG/100ML; MG/100ML
INJECTION, SOLUTION INTRAVENOUS CONTINUOUS
Status: DISCONTINUED | OUTPATIENT
Start: 2019-10-04 | End: 2019-10-04 | Stop reason: HOSPADM

## 2019-10-04 RX ORDER — FENTANYL CITRATE 50 UG/ML
25-50 INJECTION, SOLUTION INTRAMUSCULAR; INTRAVENOUS
Status: DISCONTINUED | OUTPATIENT
Start: 2019-10-04 | End: 2019-10-04 | Stop reason: HOSPADM

## 2019-10-04 RX ORDER — EPHEDRINE SULFATE 50 MG/ML
INJECTION, SOLUTION INTRAMUSCULAR; INTRAVENOUS; SUBCUTANEOUS PRN
Status: DISCONTINUED | OUTPATIENT
Start: 2019-10-04 | End: 2019-10-04

## 2019-10-04 RX ORDER — ACETAMINOPHEN 325 MG/1
650 TABLET ORAL EVERY 4 HOURS PRN
Status: DISCONTINUED | OUTPATIENT
Start: 2019-10-07 | End: 2019-10-06 | Stop reason: HOSPADM

## 2019-10-04 RX ORDER — METHOCARBAMOL 750 MG/1
750 TABLET, FILM COATED ORAL EVERY 6 HOURS PRN
Status: DISCONTINUED | OUTPATIENT
Start: 2019-10-04 | End: 2019-10-06 | Stop reason: HOSPADM

## 2019-10-04 RX ORDER — BACITRACIN ZINC 500 [USP'U]/G
OINTMENT TOPICAL PRN
Status: DISCONTINUED | OUTPATIENT
Start: 2019-10-04 | End: 2019-10-04 | Stop reason: HOSPADM

## 2019-10-04 RX ORDER — CALCIUM CARBONATE 500 MG/1
1000 TABLET, CHEWABLE ORAL 4 TIMES DAILY PRN
Status: DISCONTINUED | OUTPATIENT
Start: 2019-10-04 | End: 2019-10-06 | Stop reason: HOSPADM

## 2019-10-04 RX ORDER — HYDROXYZINE HYDROCHLORIDE 25 MG/1
25 TABLET, FILM COATED ORAL EVERY 6 HOURS PRN
Status: DISCONTINUED | OUTPATIENT
Start: 2019-10-04 | End: 2019-10-06 | Stop reason: HOSPADM

## 2019-10-04 RX ORDER — ACETAMINOPHEN 325 MG/1
975 TABLET ORAL ONCE
Status: DISCONTINUED | OUTPATIENT
Start: 2019-10-04 | End: 2019-10-04 | Stop reason: HOSPADM

## 2019-10-04 RX ORDER — SODIUM CHLORIDE 9 MG/ML
INJECTION, SOLUTION INTRAVENOUS CONTINUOUS
Status: DISCONTINUED | OUTPATIENT
Start: 2019-10-04 | End: 2019-10-06 | Stop reason: HOSPADM

## 2019-10-04 RX ORDER — FENTANYL CITRATE 50 UG/ML
INJECTION, SOLUTION INTRAMUSCULAR; INTRAVENOUS PRN
Status: DISCONTINUED | OUTPATIENT
Start: 2019-10-04 | End: 2019-10-04

## 2019-10-04 RX ORDER — FENTANYL CITRATE 0.05 MG/ML
50 INJECTION, SOLUTION INTRAMUSCULAR; INTRAVENOUS ONCE
Status: COMPLETED | OUTPATIENT
Start: 2019-10-04 | End: 2019-10-04

## 2019-10-04 RX ORDER — NALOXONE HYDROCHLORIDE 0.4 MG/ML
.1-.4 INJECTION, SOLUTION INTRAMUSCULAR; INTRAVENOUS; SUBCUTANEOUS
Status: DISCONTINUED | OUTPATIENT
Start: 2019-10-04 | End: 2019-10-04

## 2019-10-04 RX ORDER — LABETALOL HYDROCHLORIDE 5 MG/ML
10 INJECTION, SOLUTION INTRAVENOUS EVERY 10 MIN PRN
Status: DISCONTINUED | OUTPATIENT
Start: 2019-10-04 | End: 2019-10-06 | Stop reason: HOSPADM

## 2019-10-04 RX ORDER — DEXAMETHASONE 4 MG/1
4 TABLET ORAL 2 TIMES DAILY
Status: DISCONTINUED | OUTPATIENT
Start: 2019-10-04 | End: 2019-10-05

## 2019-10-04 RX ORDER — BUPIVACAINE HYDROCHLORIDE 5 MG/ML
INJECTION, SOLUTION EPIDURAL; INTRACAUDAL
Status: DISCONTINUED
Start: 2019-10-04 | End: 2019-10-04 | Stop reason: HOSPADM

## 2019-10-04 RX ORDER — HYDROMORPHONE HYDROCHLORIDE 1 MG/ML
.3-.5 INJECTION, SOLUTION INTRAMUSCULAR; INTRAVENOUS; SUBCUTANEOUS EVERY 5 MIN PRN
Status: DISCONTINUED | OUTPATIENT
Start: 2019-10-04 | End: 2019-10-04 | Stop reason: HOSPADM

## 2019-10-04 RX ORDER — DEXAMETHASONE SODIUM PHOSPHATE 4 MG/ML
INJECTION, SOLUTION INTRA-ARTICULAR; INTRALESIONAL; INTRAMUSCULAR; INTRAVENOUS; SOFT TISSUE PRN
Status: DISCONTINUED | OUTPATIENT
Start: 2019-10-04 | End: 2019-10-04

## 2019-10-04 RX ORDER — GABAPENTIN 300 MG/1
300 CAPSULE ORAL
Status: DISCONTINUED | OUTPATIENT
Start: 2019-10-04 | End: 2019-10-04 | Stop reason: HOSPADM

## 2019-10-04 RX ORDER — ACETAMINOPHEN 325 MG/1
975 TABLET ORAL EVERY 8 HOURS
Status: DISCONTINUED | OUTPATIENT
Start: 2019-10-04 | End: 2019-10-06 | Stop reason: HOSPADM

## 2019-10-04 RX ORDER — HYDROMORPHONE HYDROCHLORIDE 1 MG/ML
.3-.5 INJECTION, SOLUTION INTRAMUSCULAR; INTRAVENOUS; SUBCUTANEOUS
Status: DISCONTINUED | OUTPATIENT
Start: 2019-10-04 | End: 2019-10-06 | Stop reason: HOSPADM

## 2019-10-04 RX ORDER — CEFAZOLIN SODIUM 1 G/3ML
1 INJECTION, POWDER, FOR SOLUTION INTRAMUSCULAR; INTRAVENOUS SEE ADMIN INSTRUCTIONS
Status: DISCONTINUED | OUTPATIENT
Start: 2019-10-04 | End: 2019-10-04 | Stop reason: HOSPADM

## 2019-10-04 RX ORDER — AMOXICILLIN 250 MG
2 CAPSULE ORAL 2 TIMES DAILY
Status: DISCONTINUED | OUTPATIENT
Start: 2019-10-04 | End: 2019-10-06 | Stop reason: HOSPADM

## 2019-10-04 RX ORDER — DEXAMETHASONE 1 MG
1 TABLET ORAL 2 TIMES DAILY
Status: DISCONTINUED | OUTPATIENT
Start: 2019-10-09 | End: 2019-10-05

## 2019-10-04 RX ORDER — BUPIVACAINE HYDROCHLORIDE AND EPINEPHRINE 5; 5 MG/ML; UG/ML
INJECTION, SOLUTION PERINEURAL PRN
Status: DISCONTINUED | OUTPATIENT
Start: 2019-10-04 | End: 2019-10-04 | Stop reason: HOSPADM

## 2019-10-04 RX ORDER — ONDANSETRON 4 MG/1
4 TABLET, ORALLY DISINTEGRATING ORAL EVERY 30 MIN PRN
Status: DISCONTINUED | OUTPATIENT
Start: 2019-10-04 | End: 2019-10-04 | Stop reason: HOSPADM

## 2019-10-04 RX ORDER — OXYCODONE HYDROCHLORIDE 5 MG/1
5-10 TABLET ORAL
Status: DISCONTINUED | OUTPATIENT
Start: 2019-10-04 | End: 2019-10-05

## 2019-10-04 RX ORDER — PROPOFOL 10 MG/ML
INJECTION, EMULSION INTRAVENOUS PRN
Status: DISCONTINUED | OUTPATIENT
Start: 2019-10-04 | End: 2019-10-04

## 2019-10-04 RX ORDER — CEFAZOLIN SODIUM 2 G/100ML
2 INJECTION, SOLUTION INTRAVENOUS
Status: COMPLETED | OUTPATIENT
Start: 2019-10-04 | End: 2019-10-04

## 2019-10-04 RX ORDER — SODIUM CHLORIDE, SODIUM LACTATE, POTASSIUM CHLORIDE, CALCIUM CHLORIDE 600; 310; 30; 20 MG/100ML; MG/100ML; MG/100ML; MG/100ML
INJECTION, SOLUTION INTRAVENOUS CONTINUOUS PRN
Status: DISCONTINUED | OUTPATIENT
Start: 2019-10-04 | End: 2019-10-04

## 2019-10-04 RX ORDER — PROPOFOL 10 MG/ML
INJECTION, EMULSION INTRAVENOUS CONTINUOUS PRN
Status: DISCONTINUED | OUTPATIENT
Start: 2019-10-04 | End: 2019-10-04

## 2019-10-04 RX ORDER — PROCHLORPERAZINE MALEATE 10 MG
10 TABLET ORAL EVERY 6 HOURS PRN
Status: DISCONTINUED | OUTPATIENT
Start: 2019-10-04 | End: 2019-10-04

## 2019-10-04 RX ORDER — EPINEPHRINE 1 MG/ML
INJECTION, SOLUTION INTRAMUSCULAR; SUBCUTANEOUS
Status: DISCONTINUED
Start: 2019-10-04 | End: 2019-10-04 | Stop reason: HOSPADM

## 2019-10-04 RX ORDER — LIDOCAINE HYDROCHLORIDE 20 MG/ML
INJECTION, SOLUTION INFILTRATION; PERINEURAL PRN
Status: DISCONTINUED | OUTPATIENT
Start: 2019-10-04 | End: 2019-10-04

## 2019-10-04 RX ORDER — HYDRALAZINE HYDROCHLORIDE 20 MG/ML
5 INJECTION INTRAMUSCULAR; INTRAVENOUS ONCE
Status: COMPLETED | OUTPATIENT
Start: 2019-10-04 | End: 2019-10-04

## 2019-10-04 RX ORDER — METOCLOPRAMIDE 10 MG/1
10 TABLET ORAL EVERY 6 HOURS PRN
Status: DISCONTINUED | OUTPATIENT
Start: 2019-10-04 | End: 2019-10-06 | Stop reason: HOSPADM

## 2019-10-04 RX ORDER — ONDANSETRON 2 MG/ML
4 INJECTION INTRAMUSCULAR; INTRAVENOUS EVERY 6 HOURS PRN
Status: DISCONTINUED | OUTPATIENT
Start: 2019-10-04 | End: 2019-10-04

## 2019-10-04 RX ORDER — DEXAMETHASONE 1 MG
1 TABLET ORAL DAILY
Status: DISCONTINUED | OUTPATIENT
Start: 2019-10-11 | End: 2019-10-05

## 2019-10-04 RX ORDER — LIDOCAINE 40 MG/G
CREAM TOPICAL
Status: DISCONTINUED | OUTPATIENT
Start: 2019-10-04 | End: 2019-10-04

## 2019-10-04 RX ORDER — MAGNESIUM HYDROXIDE 1200 MG/15ML
LIQUID ORAL PRN
Status: DISCONTINUED | OUTPATIENT
Start: 2019-10-04 | End: 2019-10-04 | Stop reason: HOSPADM

## 2019-10-04 RX ORDER — ONDANSETRON 2 MG/ML
4 INJECTION INTRAMUSCULAR; INTRAVENOUS EVERY 30 MIN PRN
Status: DISCONTINUED | OUTPATIENT
Start: 2019-10-04 | End: 2019-10-04 | Stop reason: HOSPADM

## 2019-10-04 RX ORDER — HYDRALAZINE HYDROCHLORIDE 20 MG/ML
5 INJECTION INTRAMUSCULAR; INTRAVENOUS
Status: DISCONTINUED | OUTPATIENT
Start: 2019-10-04 | End: 2019-10-06 | Stop reason: HOSPADM

## 2019-10-04 RX ORDER — DEXAMETHASONE 2 MG/1
2 TABLET ORAL 2 TIMES DAILY
Status: DISCONTINUED | OUTPATIENT
Start: 2019-10-07 | End: 2019-10-05

## 2019-10-04 RX ORDER — CEFAZOLIN SODIUM 1 G/3ML
1 INJECTION, POWDER, FOR SOLUTION INTRAMUSCULAR; INTRAVENOUS EVERY 8 HOURS
Status: COMPLETED | OUTPATIENT
Start: 2019-10-04 | End: 2019-10-05

## 2019-10-04 RX ORDER — AMOXICILLIN 250 MG
1 CAPSULE ORAL 2 TIMES DAILY
Status: DISCONTINUED | OUTPATIENT
Start: 2019-10-04 | End: 2019-10-06 | Stop reason: HOSPADM

## 2019-10-04 RX ADMIN — HYDROMORPHONE HYDROCHLORIDE 0.5 MG: 1 INJECTION, SOLUTION INTRAMUSCULAR; INTRAVENOUS; SUBCUTANEOUS at 19:59

## 2019-10-04 RX ADMIN — SODIUM CHLORIDE: 9 INJECTION, SOLUTION INTRAVENOUS at 17:47

## 2019-10-04 RX ADMIN — HYDRALAZINE HYDROCHLORIDE 5 MG: 20 INJECTION INTRAMUSCULAR; INTRAVENOUS at 15:32

## 2019-10-04 RX ADMIN — DEXAMETHASONE 4 MG: 4 TABLET ORAL at 20:00

## 2019-10-04 RX ADMIN — FAMOTIDINE 20 MG: 10 INJECTION, SOLUTION INTRAVENOUS at 21:57

## 2019-10-04 RX ADMIN — SODIUM CHLORIDE, POTASSIUM CHLORIDE, SODIUM LACTATE AND CALCIUM CHLORIDE: 600; 310; 30; 20 INJECTION, SOLUTION INTRAVENOUS at 10:42

## 2019-10-04 RX ADMIN — Medication 5 MG: at 12:33

## 2019-10-04 RX ADMIN — REMIFENTANIL HYDROCHLORIDE 0.2 MCG/KG/MIN: 1 INJECTION, POWDER, LYOPHILIZED, FOR SOLUTION INTRAVENOUS at 11:44

## 2019-10-04 RX ADMIN — FENTANYL CITRATE 25 MCG: 50 INJECTION INTRAMUSCULAR; INTRAVENOUS at 15:26

## 2019-10-04 RX ADMIN — HYDROMORPHONE HYDROCHLORIDE 0.5 MG: 1 INJECTION, SOLUTION INTRAMUSCULAR; INTRAVENOUS; SUBCUTANEOUS at 23:38

## 2019-10-04 RX ADMIN — FENTANYL CITRATE 25 MCG: 50 INJECTION, SOLUTION INTRAMUSCULAR; INTRAVENOUS at 11:34

## 2019-10-04 RX ADMIN — HYDROMORPHONE HYDROCHLORIDE 0.5 MG: 1 INJECTION, SOLUTION INTRAMUSCULAR; INTRAVENOUS; SUBCUTANEOUS at 15:43

## 2019-10-04 RX ADMIN — HYDROMORPHONE HYDROCHLORIDE 0.5 MG: 1 INJECTION, SOLUTION INTRAMUSCULAR; INTRAVENOUS; SUBCUTANEOUS at 00:26

## 2019-10-04 RX ADMIN — METHYLPREDNISOLONE 4 MG: 4 TABLET ORAL at 06:40

## 2019-10-04 RX ADMIN — OXYCODONE HYDROCHLORIDE 5 MG: 5 TABLET ORAL at 20:48

## 2019-10-04 RX ADMIN — LIDOCAINE HYDROCHLORIDE 80 MG: 20 INJECTION, SOLUTION INFILTRATION; PERINEURAL at 11:37

## 2019-10-04 RX ADMIN — ACETAMINOPHEN 975 MG: 325 TABLET, FILM COATED ORAL at 20:00

## 2019-10-04 RX ADMIN — HYDROMORPHONE HYDROCHLORIDE 0.5 MG: 1 INJECTION, SOLUTION INTRAMUSCULAR; INTRAVENOUS; SUBCUTANEOUS at 15:02

## 2019-10-04 RX ADMIN — HYDROMORPHONE HYDROCHLORIDE 0.5 MG: 1 INJECTION, SOLUTION INTRAMUSCULAR; INTRAVENOUS; SUBCUTANEOUS at 02:59

## 2019-10-04 RX ADMIN — PROPOFOL 150 MCG/KG/MIN: 10 INJECTION, EMULSION INTRAVENOUS at 11:44

## 2019-10-04 RX ADMIN — SENNOSIDES AND DOCUSATE SODIUM 1 TABLET: 8.6; 5 TABLET ORAL at 20:48

## 2019-10-04 RX ADMIN — ROCURONIUM BROMIDE 30 MG: 10 INJECTION INTRAVENOUS at 11:40

## 2019-10-04 RX ADMIN — HYDROMORPHONE HYDROCHLORIDE 0.5 MG: 1 INJECTION, SOLUTION INTRAMUSCULAR; INTRAVENOUS; SUBCUTANEOUS at 07:45

## 2019-10-04 RX ADMIN — MIDAZOLAM 2 MG: 1 INJECTION INTRAMUSCULAR; INTRAVENOUS at 11:29

## 2019-10-04 RX ADMIN — CEFAZOLIN 1 G: 1 INJECTION, POWDER, FOR SOLUTION INTRAMUSCULAR; INTRAVENOUS at 21:51

## 2019-10-04 RX ADMIN — METHOCARBAMOL TABLETS 750 MG: 750 TABLET, COATED ORAL at 17:40

## 2019-10-04 RX ADMIN — LABETALOL HYDROCHLORIDE 10 MG: 5 INJECTION INTRAVENOUS at 15:55

## 2019-10-04 RX ADMIN — HYDROMORPHONE HYDROCHLORIDE 0.5 MG: 1 INJECTION, SOLUTION INTRAMUSCULAR; INTRAVENOUS; SUBCUTANEOUS at 21:57

## 2019-10-04 RX ADMIN — CEFAZOLIN 1 G: 1 INJECTION, POWDER, FOR SOLUTION INTRAMUSCULAR; INTRAVENOUS at 13:48

## 2019-10-04 RX ADMIN — SODIUM CHLORIDE, POTASSIUM CHLORIDE, SODIUM LACTATE AND CALCIUM CHLORIDE: 600; 310; 30; 20 INJECTION, SOLUTION INTRAVENOUS at 13:14

## 2019-10-04 RX ADMIN — PROPOFOL 50 MG: 10 INJECTION, EMULSION INTRAVENOUS at 12:27

## 2019-10-04 RX ADMIN — HYDROMORPHONE HYDROCHLORIDE 0.3 MG: 1 INJECTION, SOLUTION INTRAMUSCULAR; INTRAVENOUS; SUBCUTANEOUS at 18:32

## 2019-10-04 RX ADMIN — ONDANSETRON 4 MG: 2 INJECTION INTRAMUSCULAR; INTRAVENOUS at 13:46

## 2019-10-04 RX ADMIN — CEFAZOLIN SODIUM 2 G: 2 INJECTION, SOLUTION INTRAVENOUS at 11:46

## 2019-10-04 RX ADMIN — FENTANYL CITRATE 50 MCG: 50 INJECTION INTRAMUSCULAR; INTRAVENOUS at 15:13

## 2019-10-04 RX ADMIN — FENTANYL CITRATE 25 MCG: 50 INJECTION INTRAMUSCULAR; INTRAVENOUS at 15:35

## 2019-10-04 RX ADMIN — CALCIUM CARBONATE (ANTACID) CHEW TAB 500 MG 1000 MG: 500 CHEW TAB at 17:39

## 2019-10-04 RX ADMIN — DEXAMETHASONE SODIUM PHOSPHATE 4 MG: 4 INJECTION, SOLUTION INTRA-ARTICULAR; INTRALESIONAL; INTRAMUSCULAR; INTRAVENOUS; SOFT TISSUE at 12:02

## 2019-10-04 RX ADMIN — FENTANYL CITRATE 50 MCG: 0.05 INJECTION, SOLUTION INTRAMUSCULAR; INTRAVENOUS at 10:36

## 2019-10-04 RX ADMIN — HYDROMORPHONE HYDROCHLORIDE 0.5 MG: 1 INJECTION, SOLUTION INTRAMUSCULAR; INTRAVENOUS; SUBCUTANEOUS at 05:20

## 2019-10-04 RX ADMIN — HYDRALAZINE HYDROCHLORIDE 5 MG: 20 INJECTION INTRAMUSCULAR; INTRAVENOUS at 14:59

## 2019-10-04 RX ADMIN — FENTANYL CITRATE 50 MCG: 0.05 INJECTION, SOLUTION INTRAMUSCULAR; INTRAVENOUS at 11:00

## 2019-10-04 RX ADMIN — HYDROMORPHONE HYDROCHLORIDE 0.5 MG: 1 INJECTION, SOLUTION INTRAMUSCULAR; INTRAVENOUS; SUBCUTANEOUS at 13:43

## 2019-10-04 RX ADMIN — HYDROMORPHONE HYDROCHLORIDE 0.5 MG: 1 INJECTION, SOLUTION INTRAMUSCULAR; INTRAVENOUS; SUBCUTANEOUS at 09:11

## 2019-10-04 RX ADMIN — SODIUM CHLORIDE, POTASSIUM CHLORIDE, SODIUM LACTATE AND CALCIUM CHLORIDE: 600; 310; 30; 20 INJECTION, SOLUTION INTRAVENOUS at 11:48

## 2019-10-04 RX ADMIN — PROPOFOL 200 MG: 10 INJECTION, EMULSION INTRAVENOUS at 11:39

## 2019-10-04 RX ADMIN — FENTANYL CITRATE 75 MCG: 50 INJECTION, SOLUTION INTRAMUSCULAR; INTRAVENOUS at 11:37

## 2019-10-04 ASSESSMENT — LIFESTYLE VARIABLES: TOBACCO_USE: 0

## 2019-10-04 ASSESSMENT — ACTIVITIES OF DAILY LIVING (ADL)
ADLS_ACUITY_SCORE: 25
ADLS_ACUITY_SCORE: 21

## 2019-10-04 NOTE — BRIEF OP NOTE
[facility]    Brief Operative Note    Pre-operative diagnosis: LUMBAR INTRADURAL MASS  Post-operative diagnosis:      same  Procedure: Procedure(s):  L1-L2 LAMINECTOMIES  AND RESECTION OF INTRADURAL MASS WITH SPINAL MONITORING  Surgeon: Surgeon(s) and Role:     * Paco Mittal MD - Primary     * Reynold Astudillo PA-C - Assisting  Anesthesia: General   Estimated blood loss: 50 mL  Drains: None  Specimens:   ID Type Source Tests Collected by Time Destination   A : INTRADURAL TUMOR Tissue Spine, Lumbar SURGICAL PATHOLOGY EXAM Paco Mittal MD 10/4/2019  1:20 PM    B : INTRADURAL TUMOR Tissue Spine, Lumbar SURGICAL PATHOLOGY EXAM Paco Mittal MD 10/4/2019  1:20 PM      Findings:   None.  Complications: None.  Implants:    Implant Name Type Inv. Item Serial No.  Lot No. LRB No. Used   GRAFT DURAGEN 3X3&quot; ID-[id number] Bone/Tissue/Biologic GRAFT DURAGEN 3X3&quot; ID-[id number]  [employer] [id number] N/A 1        Reynold Astudillo PA-C  [facility]

## 2019-10-04 NOTE — OP NOTE
Date of surgery: 10/4/2019  Surgeon: Paco Mittal MD  Assistant: LIZETH Lara  Note: LIZETH Lara was present for and assisted with the entire surgery, and his/her role as an assistant was crucial for aid in positioning, exposure, suctioning, retraction, and closure    Preoperative diagnosis: Lumbar intradural mass and severe stenosis  Postoperative diagnosis: Lumbar intradural mass and severe stenosis    Procedure:  1.  T12-L2 bilateral laminectomy and medial facetectomy and resection of intradural mass  3.  Use of intraoperative microscope, fluoroscopy, and neuromonitoring    EBL: 50 mL    Indications: 64-year-old male who was admitted with severe back pain and neurogenic claudications.  MRI showed a large intradural mass at L1-2 with severe stenosis.  Discussed the need for surgery to establish pathology and resect the mass to decompress the nerves.  Risks, benefits, indications, and alternatives were discussed with the patient and family in detail, and they wished to proceed.    Description of surgery: The patient was positioned prone.  Sterile prepping and draping procedures were performed.  Antibiotics were administered and timeout was performed.  A midline lumbar incision was performed.  The monopolar was used to expose the spinous processes, lamina, and medial facets from T12-L2.  Fluoroscopy was used to verify the correct level.  The Leksell rongeurs was used to remove the spinous processes.  The high speed drill was then used to perform bilateral laminectomies and medial facetectomies from T12-L2.  The Kerrison rongeurs were then used to remove the Ligamentum flavum.  The microscope was brought into the field.  A midline durotomy was performed.  The tumor was identified and carefully dissected from the nerves with micro instruments under microscopy, and removed with pituitary rongeurs and suction.  The nerve roots were noted to be decompressed.  Neuromonitoring signals were monitored throughout, and  found to be stable.  The dura was closed with a running gore-gregorio suture.  The dura was re-enforced with duragen and duraseal.  Antibiotic irrigation was performed.  The fascia was closed with 0-Vicryl sutures, and the dermal layer was closed with 2-0 vicryl sutures.  There were no intraprocedural complications.

## 2019-10-04 NOTE — ANESTHESIA POSTPROCEDURE EVALUATION
Patient: Shelia Ortiz    Procedure(s):  L1-L2 LAMINECTOMIES  AND RESECTION OF INTRADURAL MASS WITH SPINAL MONITORING    Diagnosis:LUMBAR INTRADURAL MASS  Diagnosis Additional Information: No value filed.    Anesthesia Type:  General, ETT    Note:  Anesthesia Post Evaluation    Patient location during evaluation: PACU  Patient participation: Able to fully participate in evaluation  Level of consciousness: awake  Pain management: adequate  Airway patency: patent  Cardiovascular status: acceptable  Respiratory status: acceptable  Hydration status: acceptable  PONV: none     Anesthetic complications: None          Last vitals:  Vitals:    10/04/19 1647 10/04/19 1717 10/04/19 1748   BP: (!) 149/89 (!) 151/95 (!) 153/100   Pulse:      Resp: 14 13 14   Temp: 37.1  C (98.7  F)     SpO2: 99% 98% 98%         Electronically Signed By: Mian Booker MD  October 4, 2019  6:04 PM

## 2019-10-04 NOTE — PROGRESS NOTES
Patient unable to be seen today due to OR schedule, remains in OR/PACU as of 1550, will see patient tomorrow.

## 2019-10-04 NOTE — ANESTHESIA PREPROCEDURE EVALUATION
Anesthesia Pre-Procedure Evaluation    Patient: Shelia Ortiz   MRN: [id number] : 1955          Preoperative Diagnosis: LUMBAR INTRADURAL MASS    Procedure(s):  L1-L2 LAMINECTOMY AND RESECTION OF INTRUDURAL MASS ( MIDAS KRYSTINA, LEICA MICROSCOPE, JACINTO FRAME, C ARM, MICRO INSTRUMENTS)    Past Medical History:   Diagnosis Date     Hyperlipidemia LDL goal <130      Sebaceous cyst 3/20/08    left posterior shoulder     Past Surgical History:   Procedure Laterality Date     COLONOSCOPY  10/24/2013    Procedure: COLONOSCOPY;  Tubular adenoma of colon  pkt sent 10/7  rx sent  ;  Surgeon: Lenin Lopez MD;  Location: [facility] OR     COLONOSCOPY WITH CO2 INSUFFLATION N/A 2019    Procedure: COLONOSCOPY WITH CO2 INSUFFLATION;  Surgeon: Kaleb Morelos MD;  Location: [facility] OR     NO HISTORY OF SURGERY         Anesthesia Evaluation     .             ROS/MED HX    ENT/Pulmonary:      (-) tobacco use and sleep apnea   Neurologic:     (+)neuropathy     Cardiovascular:     (+) Dyslipidemia, hypertension----. : . . . :. .       METS/Exercise Tolerance:     Hematologic:         Musculoskeletal:         GI/Hepatic:     (+) GERD       Renal/Genitourinary:         Endo:         Psychiatric:         Infectious Disease:         Malignancy:   (+) Malignancy History of Neuro          Other:                          Physical Exam  Normal systems: cardiovascular, pulmonary and dental    Airway   Mallampati: II  TM distance: >3 FB  Neck ROM: full    Dental     Cardiovascular       Pulmonary             Lab Results   Component Value Date    WBC 7.4 10/03/2019    HGB 14.5 10/03/2019    HCT 42.4 10/03/2019     10/03/2019     10/03/2019    POTASSIUM 4.0 10/03/2019    CHLORIDE 103 10/03/2019    CO2 26 10/03/2019    BUN 17 10/03/2019    CR 0.90 10/03/2019     (H) 10/03/2019    JACQUE 9.4 10/03/2019    ALBUMIN 4.1 2019    PROTTOTAL 7.0 2019    ALT 45 2019    AST 22 2019    ALKPHOS 69 2019  "   BILITOTAL 0.8 09/03/2019       Preop Vitals  BP Readings from Last 3 Encounters:   10/04/19 (!) 151/97   09/19/19 126/84   09/14/19 (!) 138/98    Pulse Readings from Last 3 Encounters:   10/02/19 86   09/19/19 89   09/14/19 81      Resp Readings from Last 3 Encounters:   10/04/19 16   09/14/19 16   08/26/19 20    SpO2 Readings from Last 3 Encounters:   10/04/19 98%   09/19/19 97%   09/14/19 98%      Temp Readings from Last 1 Encounters:   10/04/19 36.5  C (97.7  F) (Oral)    Ht Readings from Last 1 Encounters:   10/02/19 1.778 m (5' 10\")      Wt Readings from Last 1 Encounters:   10/02/19 78 kg (172 lb)    Estimated body mass index is 24.68 kg/m  as calculated from the following:    Height as of this encounter: 1.778 m (5' 10\").    Weight as of this encounter: 78 kg (172 lb).       Anesthesia Plan      History & Physical Review  History and physical reviewed and following examination; no interval change.    ASA Status:  2 .    NPO Status:  > 8 hours    Plan for General and ETT with Intravenous and Propofol induction. Maintenance will be Inhalation.    PONV prophylaxis:  Ondansetron (or other 5HT-3) and Dexamethasone or Solumedrol  Additional equipment: 2nd IV Propofol and ced gtt's      Postoperative Care  Postoperative pain management:  IV analgesics and Oral pain medications.      Consents  Anesthetic plan, risks, benefits and alternatives discussed with:  Patient and Spouse..                 Reagan Wang MD  "

## 2019-10-04 NOTE — PLAN OF CARE
POD 0 L1-L2 laminectomy and removal of intradural mass. Returned from PACU at 1645. Alert and oriented x4. VSS on 2 L O2 NC. CMS intact. 5/5 strength BLEs. Denies back pain. C/o of intermittent pain in L ankle and scrotum, but overall feels much more comfortable than he did before surgery. Lung sounds clear. BS hypoactive. Quick patent. Dressing with a small amt of sanguinous drainage, dressing marked. On flat bedrest until 0700 tomorrow.

## 2019-10-04 NOTE — ANESTHESIA CARE TRANSFER NOTE
Patient: Shelia Ortiz    Procedure(s):  L1-L2 LAMINECTOMIES  AND RESECTION OF INTRADURAL MASS WITH SPINAL MONITORING    Diagnosis: LUMBAR INTRADURAL MASS  Diagnosis Additional Information: No value filed.    Anesthesia Type:   General, ETT     Note:  Airway :Face Mask  Patient transferred to:PACU  Comments: Pt exhibits spont resps, all monitors and alarms on in pacu, report given to RN, vss.Handoff Report: Identifed the Patient, Identified the Reponsible Provider, Reviewed the pertinent medical history, Discussed the surgical course, Reviewed Intra-OP anesthesia mangement and issues during anesthesia, Set expectations for post-procedure period and Allowed opportunity for questions and acknowledgement of understanding      Vitals: (Last set prior to Anesthesia Care Transfer)    CRNA VITALS  10/4/2019 1353 - 10/4/2019 1430      10/4/2019             Pulse:  81    SpO2:  100 %    Resp Rate (observed):  16                Electronically Signed By: IMMANUEL Waterman CRNA  October 4, 2019  2:30 PM

## 2019-10-04 NOTE — PLAN OF CARE
Here for surgery with Dr. Mittal today at 11:00am. A&Ox4. CMS intact. Regular diet. VSS. Quick in place. Up with A1/GB/W. Surgical wash completed. C/o minimal pain while laying in bed, moderate pain with movement. Tolerating IV dilaudid q2h PRN. Pt scoring green on the Aggression Stop Light Tool. Slept well. Continue to monitor.

## 2019-10-04 NOTE — OR NURSING
Spoke with Dr. Booker regarding elevated blood pressure. Order obtained for 5mg of Hydralazine IV.

## 2019-10-05 ENCOUNTER — APPOINTMENT (OUTPATIENT)
Dept: PHYSICAL THERAPY | Facility: CLINIC | Age: 64
DRG: 520 | End: 2019-10-05
Payer: COMMERCIAL

## 2019-10-05 ENCOUNTER — APPOINTMENT (OUTPATIENT)
Dept: OCCUPATIONAL THERAPY | Facility: CLINIC | Age: 64
DRG: 520 | End: 2019-10-05
Attending: PHYSICIAN ASSISTANT
Payer: COMMERCIAL

## 2019-10-05 LAB
ANION GAP SERPL CALCULATED.3IONS-SCNC: 6 MMOL/L (ref 3–14)
BUN SERPL-MCNC: 22 MG/DL (ref 7–30)
CALCIUM SERPL-MCNC: 8.6 MG/DL (ref 8.5–10.1)
CHLORIDE SERPL-SCNC: 105 MMOL/L (ref 94–109)
CO2 SERPL-SCNC: 27 MMOL/L (ref 20–32)
CREAT SERPL-MCNC: 0.84 MG/DL (ref 0.66–1.25)
ERYTHROCYTE [DISTWIDTH] IN BLOOD BY AUTOMATED COUNT: 13 % (ref 10–15)
GFR SERPL CREATININE-BSD FRML MDRD: >90 ML/MIN/{1.73_M2}
GLUCOSE BLDC GLUCOMTR-MCNC: 134 MG/DL (ref 70–99)
GLUCOSE SERPL-MCNC: 131 MG/DL (ref 70–99)
HCT VFR BLD AUTO: 38.2 % (ref 40–53)
HGB BLD-MCNC: 12.7 G/DL (ref 13.3–17.7)
MCH RBC QN AUTO: 30.4 PG (ref 26.5–33)
MCHC RBC AUTO-ENTMCNC: 33.2 G/DL (ref 31.5–36.5)
MCV RBC AUTO: 91 FL (ref 78–100)
PLATELET # BLD AUTO: 185 10E9/L (ref 150–450)
POTASSIUM SERPL-SCNC: 4 MMOL/L (ref 3.4–5.3)
RBC # BLD AUTO: 4.18 10E12/L (ref 4.4–5.9)
SODIUM SERPL-SCNC: 138 MMOL/L (ref 133–144)
WBC # BLD AUTO: 12.3 10E9/L (ref 4–11)

## 2019-10-05 PROCEDURE — 97116 GAIT TRAINING THERAPY: CPT | Mod: GP

## 2019-10-05 PROCEDURE — 99232 SBSQ HOSP IP/OBS MODERATE 35: CPT | Performed by: INTERNAL MEDICINE

## 2019-10-05 PROCEDURE — 25000132 ZZH RX MED GY IP 250 OP 250 PS 637: Performed by: INTERNAL MEDICINE

## 2019-10-05 PROCEDURE — 25000132 ZZH RX MED GY IP 250 OP 250 PS 637: Performed by: PHYSICIAN ASSISTANT

## 2019-10-05 PROCEDURE — 97535 SELF CARE MNGMENT TRAINING: CPT | Mod: GO | Performed by: OCCUPATIONAL THERAPIST

## 2019-10-05 PROCEDURE — 25800030 ZZH RX IP 258 OP 636: Performed by: PHYSICIAN ASSISTANT

## 2019-10-05 PROCEDURE — 85027 COMPLETE CBC AUTOMATED: CPT | Performed by: INTERNAL MEDICINE

## 2019-10-05 PROCEDURE — 97530 THERAPEUTIC ACTIVITIES: CPT | Mod: GP

## 2019-10-05 PROCEDURE — 25000131 ZZH RX MED GY IP 250 OP 636 PS 637: Performed by: PHYSICIAN ASSISTANT

## 2019-10-05 PROCEDURE — 00000146 ZZHCL STATISTIC GLUCOSE BY METER IP

## 2019-10-05 PROCEDURE — 25000128 H RX IP 250 OP 636: Performed by: PHYSICIAN ASSISTANT

## 2019-10-05 PROCEDURE — 97161 PT EVAL LOW COMPLEX 20 MIN: CPT | Mod: GP

## 2019-10-05 PROCEDURE — 12000000 ZZH R&B MED SURG/OB

## 2019-10-05 PROCEDURE — 36415 COLL VENOUS BLD VENIPUNCTURE: CPT | Performed by: INTERNAL MEDICINE

## 2019-10-05 PROCEDURE — 97165 OT EVAL LOW COMPLEX 30 MIN: CPT | Mod: GO | Performed by: OCCUPATIONAL THERAPIST

## 2019-10-05 PROCEDURE — 80048 BASIC METABOLIC PNL TOTAL CA: CPT | Performed by: INTERNAL MEDICINE

## 2019-10-05 PROCEDURE — 25000132 ZZH RX MED GY IP 250 OP 250 PS 637: Performed by: HOSPITALIST

## 2019-10-05 RX ORDER — HYDROMORPHONE HYDROCHLORIDE 2 MG/1
2-4 TABLET ORAL
Status: DISCONTINUED | OUTPATIENT
Start: 2019-10-05 | End: 2019-10-06 | Stop reason: HOSPADM

## 2019-10-05 RX ORDER — GABAPENTIN 100 MG/1
100 CAPSULE ORAL 3 TIMES DAILY
Status: DISCONTINUED | OUTPATIENT
Start: 2019-10-05 | End: 2019-10-06 | Stop reason: HOSPADM

## 2019-10-05 RX ADMIN — GABAPENTIN 100 MG: 100 CAPSULE ORAL at 11:28

## 2019-10-05 RX ADMIN — SENNOSIDES AND DOCUSATE SODIUM 1 TABLET: 8.6; 5 TABLET ORAL at 09:08

## 2019-10-05 RX ADMIN — SENNOSIDES AND DOCUSATE SODIUM 2 TABLET: 8.6; 5 TABLET ORAL at 19:57

## 2019-10-05 RX ADMIN — HYDROMORPHONE HYDROCHLORIDE 2 MG: 2 TABLET ORAL at 12:37

## 2019-10-05 RX ADMIN — GABAPENTIN 100 MG: 100 CAPSULE ORAL at 16:07

## 2019-10-05 RX ADMIN — HYDROMORPHONE HYDROCHLORIDE 1 MG: 2 TABLET ORAL at 09:07

## 2019-10-05 RX ADMIN — HYDROMORPHONE HYDROCHLORIDE 2 MG: 2 TABLET ORAL at 16:07

## 2019-10-05 RX ADMIN — GABAPENTIN 100 MG: 100 CAPSULE ORAL at 23:16

## 2019-10-05 RX ADMIN — ACETAMINOPHEN 975 MG: 325 TABLET, FILM COATED ORAL at 11:27

## 2019-10-05 RX ADMIN — HYDROMORPHONE HYDROCHLORIDE 2 MG: 2 TABLET ORAL at 23:16

## 2019-10-05 RX ADMIN — SODIUM CHLORIDE: 9 INJECTION, SOLUTION INTRAVENOUS at 06:59

## 2019-10-05 RX ADMIN — DEXAMETHASONE 4 MG: 4 TABLET ORAL at 09:08

## 2019-10-05 RX ADMIN — RANITIDINE 150 MG: 150 TABLET ORAL at 09:08

## 2019-10-05 RX ADMIN — HYDROMORPHONE HYDROCHLORIDE 0.5 MG: 1 INJECTION, SOLUTION INTRAMUSCULAR; INTRAVENOUS; SUBCUTANEOUS at 02:03

## 2019-10-05 RX ADMIN — RANITIDINE 150 MG: 150 TABLET ORAL at 23:16

## 2019-10-05 RX ADMIN — OXYCODONE HYDROCHLORIDE 10 MG: 5 TABLET ORAL at 01:03

## 2019-10-05 RX ADMIN — HYDROMORPHONE HYDROCHLORIDE 1 MG: 2 TABLET ORAL at 05:14

## 2019-10-05 RX ADMIN — ACETAMINOPHEN 975 MG: 325 TABLET, FILM COATED ORAL at 19:57

## 2019-10-05 RX ADMIN — ACETAMINOPHEN 975 MG: 325 TABLET, FILM COATED ORAL at 03:48

## 2019-10-05 RX ADMIN — CEFAZOLIN 1 G: 1 INJECTION, POWDER, FOR SOLUTION INTRAMUSCULAR; INTRAVENOUS at 05:14

## 2019-10-05 RX ADMIN — HYDROMORPHONE HYDROCHLORIDE 2 MG: 2 TABLET ORAL at 19:57

## 2019-10-05 ASSESSMENT — ACTIVITIES OF DAILY LIVING (ADL)
ADLS_ACUITY_SCORE: 25
ADLS_ACUITY_SCORE: 19
ADLS_ACUITY_SCORE: 25
ADLS_ACUITY_SCORE: 19
ADLS_ACUITY_SCORE: 25
ADLS_ACUITY_SCORE: 25
PREVIOUS_RESPONSIBILITIES: MEAL PREP;HOUSEKEEPING;LAUNDRY;SHOPPING;MEDICATION MANAGEMENT;YARDWORK;FINANCES;DRIVING

## 2019-10-05 ASSESSMENT — MIFFLIN-ST. JEOR: SCORE: 1586.89

## 2019-10-05 NOTE — PROGRESS NOTES
"[facility]    Neurosurgery  Daily Post-Op Note    Assessment & Plan   Procedure(s):  L1-L2 LAMINECTOMIES  AND RESECTION OF INTRADURAL MASS WITH SPINAL MONITORING   -1 Day Post-Op  Doing well.  Describes a lot of \"pulsating\" left leg pain, but getting better control now on po dilaudid.   Adding gabapentin as well.     Plan:  -Advance activity as tolerated  -Continue supportive and symptomatic treatment        Reynold Astudillo    Interval History   Stable.  Doing well.    Physical Exam   Temp: 97.5  F (36.4  C) Temp src: Oral BP: 139/89 Pulse: 83 Heart Rate: 76 Resp: 18 SpO2: 99 % O2 Device: Nasal cannula Oxygen Delivery: 1 LPM  Vitals:    10/02/19 1428 10/05/19 0631   Weight: 78 kg (172 lb) 78.1 kg (172 lb 1.6 oz)     Vital Signs with Ranges  Temp:  [97.5  F (36.4  C)-99.4  F (37.4  C)] 97.5  F (36.4  C)  Pulse:  [] 83  Heart Rate:  [] 76  Resp:  [10-25] 18  BP: (139-183)/() 139/89  SpO2:  [96 %-100 %] 99 %  I/O last 3 completed shifts:  In: 2600 [I.V.:2600]  Out: 3595 [Urine:3545; Blood:50]    Alert and oriented.  Moves all extremities equally.    Incision: CDI      Medications     sodium chloride 75 mL/hr at 10/05/19 0659        acetaminophen  975 mg Oral Q8H     ranitidine  150 mg Oral Q12H    Or     famotidine  20 mg Intravenous Q12H     gabapentin  100 mg Oral TID     influenza vaccine adult (product based on age)  0.5 mL Intramuscular Prior to discharge     senna-docusate  1 tablet Oral BID    Or     senna-docusate  2 tablet Oral BID     sodium chloride (PF)  3 mL Intracatheter Q8H       Data         Reynold Astudillo PAJANIA  [facility]    "

## 2019-10-05 NOTE — PROGRESS NOTES
10/05/19 1400   Quick Adds   Type of Visit Initial Occupational Therapy Evaluation   Living Environment   Lives With spouse;child(barb), adult   Living Arrangements house   Home Accessibility stairs to enter home;stairs within home   Number of Stairs, Main Entrance 2   Stair Railings, Main Entrance none   Number of Stairs, Within Home, Primary 7   Stair Railings, Within Home, Primary railings on both sides of stairs   Transportation Anticipated family or friend will provide;car, drives self   Living Environment Comment Pt reports he has a step in shower   Self-Care   Usual Activity Tolerance excellent   Current Activity Tolerance moderate   Regular Exercise Yes   Activity/Exercise Type walking   Exercise Amount/Frequency daily;45 mins   Equipment Currently Used at Home none   Activity/Exercise/Self-Care Comment Pt has had increasing pain recently, but was previosuly walking 45 min daily and using a standing desk for work   Functional Level   Ambulation 0-->independent   Transferring 0-->independent   Toileting 0-->independent   Bathing 2-->assistive person   Dressing 2-->assistive person   Eating 0-->independent   Communication 0-->understands/communicates without difficulty   Swallowing 0-->swallows foods/liquids without difficulty   Cognition 0 - no cognition issues reported   Fall history within last six months no   Which of the above functional risks had a recent onset or change? ambulation;transferring;bathing;dressing   Prior Functional Level Comment Pt was IND In ADLs and IADLs previous to increased pain 2 weeks ago, since then his wife has been helping as needed for pain. Pt reports he has a high toilet with a handhold, wife is supportive and able to help, daughter is availabel ot help and is 19yo   General Information   Onset of Illness/Injury or Date of Surgery - Date 10/02/19   Referring Physician Reynold Astudillo, PAYolandaC   Patient/Family Goals Statement to return to IND   Additional Occupational Profile  Info/Pertinent History of Current Problem 64 year old male with PMH HTN, CNS tumor who was admitted on 10/2/2019 with acute on chronic back pain. Underwent L1-L2 laminectomy and resection of intradural mass on 10/4/2019.   Precautions/Limitations fall precautions;spinal precautions   Cognitive Status Examination   Orientation orientation to person, place and time   Level of Consciousness alert   Follows Commands (Cognition) WFL   Sensory Examination   Sensory Comments Pt reports some sensory involvement in LLE, states it is improving   Pain Assessment   Patient Currently in Pain Yes, see Vital Sign flowsheet   Range of Motion (ROM)   ROM Comment BUE WFL for ADLs   Strength   Strength Comments BUE WFL for ADLs   Hand Strength   Hand Strength Comments BUE WFL for ADLs   Coordination   Upper Extremity Coordination No deficits were identified   Mobility   Bed Mobility Comments MIN A   Transfer Skill: Bed to Chair/Chair to Bed   Level of [place]: Bed to Chair stand-by assist   Physical Assist/Nonphysical Assist: Bed to Chair supervision;verbal cues   Weight-Bearing Restrictions full weight-bearing   Assistive Device - Transfer Skill Bed to Chair Chair to Bed Rehab Eval rolling walker   Transfer Skill: Sit to Stand   Level of [place]: Sit/Stand stand-by assist   Physical Assist/Nonphysical Assist: Sit/Stand supervision;verbal cues   Transfer Skill: Sit to Stand full weight-bearing   Assistive Device for Transfer: Sit/Stand rolling walker   Transfer Skill: Toilet Transfer   Level of [place]: Toilet stand-by assist   Physical Assist/Nonphysical Assist: Toilet supervision;verbal cues   Weight-Bearing Restrictions: Toilet full weight-bearing   Assistive Device rolling walker;grab bars;seat riser   Balance   Balance Comments no LOB, no path deviations noted   Upper Body Dressing   Level of [place]: Dress Upper Body independent   Lower Body Dressing   Level of [place]: Dress Lower Body minimum assist  "(75% patients effort)   Toileting   Level of [place]: Toilet stand-by assist   Grooming   Level of [place]: Grooming independent   Eating/Self Feeding   Level of [place]: Eating independent   Instrumental Activities of Daily Living (IADL)   Previous Responsibilities meal prep;housekeeping;laundry;shopping;medication management;yardwork;finances;driving   Activities of Daily Living Analysis   Impairments Contributing to Impaired Activities of Daily Living pain;post surgical precautions   General Therapy Interventions   Planned Therapy Interventions ADL retraining;IADL retraining;transfer training;bed mobility training   Clinical Impression   Criteria for Skilled Therapeutic Interventions Met yes, treatment indicated   OT Diagnosis reduced IND IN ADLs   Influenced by the following impairments post surgical precautions and pain   Assessment of Occupational Performance 3-5 Performance Deficits   Identified Performance Deficits bathing, dressing, bed mobility, toielting   Clinical Decision Making (Complexity) Low complexity   Therapy Frequency Other (see comments)  (1 time)   Predicted Duration of Therapy Intervention (days/wks) 1 day   Anticipated Equipment Needs at Discharge reacher   Anticipated Discharge Disposition Home with Assist   Risks and Benefits of Treatment have been explained. Yes   Patient, Family & other staff in agreement with plan of care Yes   [facility] TM \"6 Clicks\"   2016, Trustees of [facility], under license to [employer].  All rights reserved.   6 Clicks Short Forms Daily Activity Inpatient Short Form   [facility]  \"6 Clicks\" Daily Activity Inpatient Short Form   1. Putting on and taking off regular lower body clothing? 3 - A Little   2. Bathing (including washing, rinsing, drying)? 3 - A Little   3. Toileting, which includes using toilet, bedpan or urinal? 3 - A Little   4. Putting on and taking off regular upper body clothing? 4 - None   5. " Taking care of personal grooming such as brushing teeth? 4 - None   6. Eating meals? 4 - None   Daily Activity Raw Score (Score out of 24.Lower scores equate to lower levels of function) 21   Total Evaluation Time   Total Evaluation Time (Minutes) 5

## 2019-10-05 NOTE — PROGRESS NOTES
10/05/19 1200   Quick Adds   Quick Adds Certification   Type of Visit Initial PT Evaluation   Living Environment   Lives With spouse   Living Arrangements house   Home Accessibility stairs to enter home;stairs within home   Number of Stairs, Main Entrance 2   Stair Railings, Main Entrance railings safe and in good condition;railings on both sides of stairs   Number of Stairs, Within Home, Primary 7   Stair Railings, Within Home, Primary railing on left side (ascending)   Transportation Anticipated family or friend will provide   Self-Care   Usual Activity Tolerance moderate   Current Activity Tolerance poor   Regular Exercise Yes   Activity/Exercise Type walking   Exercise Amount/Frequency daily   Equipment Currently Used at Home none   Activity/Exercise/Self-Care Comment PT: Patient states he walked 45 minutes two weeks ago.    Functional Level Prior   Ambulation 0-->independent   Transferring 0-->independent   Toileting 0-->independent   Fall history within last six months no   Which of the above functional risks had a recent onset or change? ambulation;transferring;toileting   General Information   Onset of Illness/Injury or Date of Surgery - Date 10/04/19   Referring Physician Reynold Astudillo, WADE   Patient/Family Goals Statement Return home   Pertinent History of Current Problem (include personal factors and/or comorbidities that impact the POC) Underwent L1-L2 laminectomy and resection of intradural mass on 10/4/2019. See EMR for additional details.    Precautions/Limitations spinal precautions   Weight-Bearing Status - LUE full weight-bearing   Weight-Bearing Status - RUE full weight-bearing   Weight-Bearing Status - LLE full weight-bearing   Weight-Bearing Status - RLE full weight-bearing   Cognitive Status Examination   Orientation orientation to person, place and time   Level of Consciousness alert   Follows Commands and Answers Questions 100% of the time   Personal Safety and Judgment intact   Memory  intact   Pain Assessment   Patient Currently in Pain Yes, see Vital Sign flowsheet   Integumentary/Edema   Integumentary/Edema no deficits were identifed   Posture    Posture Forward head position;Protracted shoulders   Range of Motion (ROM)   ROM Comment PT: ROM of LEs not assessed d/t reports of pain in sitting and laying supine. ROM WFL for ambulation.    Strength   Strength Comments PT: Pt unable to complete seated hip flexion or knee extension d/t reports of back pain. Not tested against resistance. LE strength is WFL for ambulation.    Bed Mobility   Bed Mobility Comments PT: Claudette from supine to/from EOB with HOB flat   Transfer Skills   Transfer Comments PT: CGA from EOB to standing at FWW.    Gait   Gait Comments PT: Ambulated 10' with FWW and CGA   Balance   Balance Comments PT: bilateral UE use to maintain standing balance.    Sensory Examination   Sensory Perception no deficits were identified   Coordination   Coordination Comments Not assessed due to LBP with LE motion   Muscle Tone   Muscle Tone no deficits were identified   General Therapy Interventions   Planned Therapy Interventions balance training;bed mobility training;gait training;neuromuscular re-education;ROM;strengthening   Clinical Impression   Criteria for Skilled Therapeutic Intervention yes, treatment indicated   PT Diagnosis Impaired functional mobility   Influenced by the following impairments Decreased activity tolerance, low back pain, impaired balance, decreased strength of bilateral LEs   Functional limitations due to impairments Difficulty with ambulation, transfers and bed mobility   Clinical Presentation Stable/Uncomplicated   Clinical Presentation Rationale Per clinical judgement   Clinical Decision Making (Complexity) Low complexity   Therapy Frequency 2x/day   Predicted Duration of Therapy Intervention (days/wks) 5 days   Anticipated Equipment Needs at Discharge front wheeled walker   Anticipated Discharge Disposition Home with  "Assist   Risk & Benefits of therapy have been explained Yes   Patient, Family & other staff in agreement with plan of care Yes   [facility] AM-PAC  \"6 Clicks\" V.2 Basic Mobility Inpatient Short Form   1. Turning from your back to your side while in a flat bed without using bedrails? 3 - A Little   2. Moving from lying on your back to sitting on the side of a flat bed without using bedrails? 3 - A Little   3. Moving to and from a bed to a chair (including a wheelchair)? 3 - A Little   4. Standing up from a chair using your arms (e.g., wheelchair, or bedside chair)? 3 - A Little   5. To walk in hospital room? 3 - A Little   6. Climbing 3-5 steps with a railing? 1 - Total   Basic Mobility Raw Score (Score out of 24.Lower scores equate to lower levels of function) 16   Total Evaluation Time   Total Evaluation Time (Minutes) 20     "

## 2019-10-05 NOTE — PROVIDER NOTIFICATION
"Text page sent to LIZETH Astudillo, \"Can we get clarification on HOB elevation for this morning? Patient has been HOB flat overnight. \"      Call back from Reynold Astudillo, updated on pain control overnight, and change to PO dilaudid with better relief. Biggest complaint is pulsating pain on left leg. OK to raise HOB, up as tolerated.  "

## 2019-10-05 NOTE — PLAN OF CARE
Problem: Patient Care Overview    Plan: Patient would benefit from short distance ambulation with nursing 3x during afternoon shift.     Patient plan for discharge: Home with wife versus TCU    Current status: Physical Therapy orders received. Evaluation completed and treatment initiated.     Patient is a 64 year old male who is post op day 1 following L1-2 laminectomies and resectino of intradural mass. The patient lives with spouse in 2-story home; two steps to enter the home with bilateral railings. Bedroom/bathroom are on second level with bilateral railings.      Bed Mobility: Minimal assist for transfer from supine to/from sitting at edge of bed. Patient utilized step stool from EOB to supine, and has one at home.    Sit to/From Stand: The patient required CGA from edge of bed to FWW x 3 reps.    Ambulation: Ambulated 100' with FWW and CGA.     Barriers to return to prior living situation:   Assistance level required,  fall risk, decreased activity tolerance, decreased strength of bilateral LEs    Recommendations for discharge:   Pending patient completes stairs: Home with spouse who is able to assist with 24/7 mobility  Front Wheeled Walker.   Home Exercise Program    Rationale for recommendations:  Pending that patient is able to ascend/descend stairs safely, anticipate that patient will progress to safely return home at discharge. The patient will have 24/7 caregiver assist upon discharge. Recommend 24/7 assist with bed mobility, ambulation and stairs.     If patient is unable to safely complete stairs, would benefit from therapy at a TCU to progress strength, activity tolerance, transfers, and gait.          Entered by: Jerri Ng 10/05/2019 12:08 PM

## 2019-10-05 NOTE — PLAN OF CARE
POD 1 from a L1-L2 Lami and removal of Indural mass. A&Ox4. CMS intact with 5/5 strength on assessment- Pt states he feels his LLE is weaker- MD notified. Bowel sounds hypoactive, passing flatus, tolerating full liquid diet. VSS on 1L O2. Dressing reinforced at 1930- clean dry and intact. Flat bedrest until 0700. Quick in place. C/o 5-10/10 throbbing posterior LLE  Pain and throbbing L testicular pain, decreased with 10mg oxycodone and 0.5 mg IV dilaudid. Pt states IV dilaudid is most helpful. repositioned frequently. Pt scoring green on the Aggression Stop Light Tool. Plan for continued pain control and mobilization.

## 2019-10-05 NOTE — PROGRESS NOTES
[facility]    Hospitalist Progress Note    Interval History   - Back pain improved, but complains of left testicular pain and left leg pain radiating from his lower back    Assessment & Plan   Summary: Shelia Ortiz is a 64 year old male with PMH HTN, CNS tumor who was admitted on 10/2/2019 with acute on chronic back pain. Underwent L1-L2 laminectomy and resection of intradural mass on 10/4/2019.    Acute on chronic back pain secondary to underlying CNS tumor:  MRI 8/29/19 notes 2.6 x 1.1 cm intradural, extra medullary, peripherally enhancing cystic mass approximately at the level of L1-2. There is an additional enhancing mass at the level of the right S2 neural foramen and a subcentimeter enhancing nodule immediately superior to the index mass. 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 Underwent L1-L2 laminectomy and resection of intradural mass on 10/4/2019.  - Appreciate NSG involvement   - Post-op pain, DVT, diet, saleh  - PT  - Gabapentin 100mg TID for left leg pain  - Defer testicular symptoms to NSG, left testicle appears normal on my exam    Reflux: Ranitidine     Hypertension: Likely related to pain.  - Hydralazine IV PRN     DVT Prophylaxis: Pneumatic Compression Devices  Code Status: Full Code  PT/OT: ordered    Disposition: Expected discharge tomorrow    Moustapha Sanchez MD  Text Page  (7am to 6pm)  -Data reviewed today: I reviewed all new labs and imaging results over the last 24 hours.    Physical Exam   Temp: 97.5  F (36.4  C) Temp src: Oral BP: 139/89 Pulse: 83 Heart Rate: 76 Resp: 18 SpO2: 99 % O2 Device: Nasal cannula Oxygen Delivery: 1 LPM  Vitals:    10/02/19 1428 10/05/19 0631   Weight: 78 kg (172 lb) 78.1 kg (172 lb 1.6 oz)     Vital Signs with Ranges  Temp:  [97.5  F (36.4  C)-99.4  F (37.4  C)] 97.5  F (36.4  C)  Pulse:  [] 83  Heart Rate:  [] 76  Resp:  [10-25]  18  BP: (139-183)/() 139/89  SpO2:  [96 %-100 %] 99 %  I/O last 3 completed shifts:  In: 2600 [I.V.:2600]  Out: 3595 [Urine:3545; Blood:50]  O2 requirements: none    Constitutional: Male in mild pain laying on right side  HEENT: Eyes nonicteric, oral mucosa moist  Cardiovascular: RRR, normal S1/2, no m/r/g  Respiratory: CTAB, no wheezing or crackles  Vascular: No LE pitting edema  : Both testicles soft, no masses palpated, saleh present  Skin/Integumen: No rashes  Neuro/Psych: Appropriate affect and mood. A&Ox3, moves all extremities    Medications     sodium chloride 75 mL/hr at 10/05/19 0659       acetaminophen  975 mg Oral Q8H     ranitidine  150 mg Oral Q12H    Or     famotidine  20 mg Intravenous Q12H     gabapentin  100 mg Oral TID     influenza vaccine adult (product based on age)  0.5 mL Intramuscular Prior to discharge     senna-docusate  1 tablet Oral BID    Or     senna-docusate  2 tablet Oral BID     sodium chloride (PF)  3 mL Intracatheter Q8H       Data   Recent Labs   Lab 10/05/19  0737 10/03/19  0737   WBC 12.3* 7.4   HGB 12.7* 14.5   MCV 91 91    250    134   POTASSIUM 4.0 4.0   CHLORIDE 105 103   CO2 27 26   BUN 22 17   CR 0.84 0.90   ANIONGAP 6 5   JACQUE 8.6 9.4   * 162*       Imaging:   Recent Results (from the past 24 hour(s))   XR Surgery IDALIA L/T 5 Min Fluoro w Stills    Narrative    XR SURGERY C-ARM FLUOROSCOPY LESS THAN FIVE MINUTES WITH STILLS   10/4/2019 2:00 PM     COMPARISON: MRI of the lumbar spine 8/29/2019    HISTORY: L1-L2 laminectomies.     10 seconds fluoroscopy time. 2140-0956. Five images.    NUMBER OF IMAGES ACQUIRED: 5    VIEWS: Lateral views of the lumbar spine    FLUOROSCOPY TIME: .2 minutes.      Impression    IMPRESSION: The first image again demonstrates a transitional  lumbosacral segment, which on the previous MRI examination was called  a lumbarized S1. The next 2 images show linear instruments pointing  toward the upper and lower lumbar spine  posterior elements (not enough  information in the field of view to determine which levels exactly).  The second to last image shows a linear metallic instrument pointing  towards the L4 vertebral body. The final image demonstrates a metallic  instrument pointing to the L3 superior endplate. Please note that no  intraoperative consultation of radiology was requested for this  examination. Please see the operative report for additional  information and real-time findings.    NIMO VILLAR MD

## 2019-10-05 NOTE — PROVIDER NOTIFICATION
"MD Notification    Notified Person: MD    Notified Person Name: Shaunna Watson     Notification Date/Time: 0403    Notification Interaction: page    Purpose of Notification: \"POD1 L1-2 lami. oxycodone and robaxin ineffective at reducing pain. IV dilaudid helpful. Can I get an order for PO dilaudid?\"    Orders Received: PO dilaudid 1-2mg q3 hrs PRN.     Comments:    "

## 2019-10-05 NOTE — PROVIDER NOTIFICATION
"MD Notification    Notified Person: MD    Notified Person Name: Khoa Astudillo PA    Notification Date/Time: 10/4/19 2059    Notification Interaction: page    Purpose of Notification: \"Pt having 8-9/10 throbbing pain in LLE and feels that that extremity is weaker when assessed. upon RN assessment strength is 5/5 in all extremities denies numbness and tingling.\"     Orders Received: Keep up on pain medications overnight to help pain. Monitor weakness    Comments:      "

## 2019-10-05 NOTE — PLAN OF CARE
"POD 1 from L1-2 lammy/ removal inderal mass with Dr. Mittal. A&O x4. CMS intact. Bowel sounds active, positive flatus. Tolerating full liquid diet.  VSS on RA. Dressing CDI. Up A1/gb/walker. Quick removed, DTV. C/o pain to left lower leg describing as \" pulsating\". Pain decreased with ambulation, PO dilaudid and gabapentin. Pt scoring green on the Aggression Stop Light Tool. Plan continue pain management, ambulation, discharge plan pending progress.       Addendum: Patient pain well controlled with PO dilaudid and addition of gabapentin. Tolerating regular food well. Attempted to void x1 with minimal output, bladder scanned for 350. Pt attempting a walk and would like to eat dinner and try again before need for straight cath.   Addendum:  Patient able to void, oncoming nurse to bladder scan.   "

## 2019-10-06 ENCOUNTER — APPOINTMENT (OUTPATIENT)
Dept: PHYSICAL THERAPY | Facility: CLINIC | Age: 64
DRG: 520 | End: 2019-10-06
Payer: COMMERCIAL

## 2019-10-06 VITALS
BODY MASS INDEX: 24.64 KG/M2 | HEIGHT: 70 IN | OXYGEN SATURATION: 97 % | RESPIRATION RATE: 16 BRPM | HEART RATE: 98 BPM | WEIGHT: 172.1 LBS | TEMPERATURE: 97.6 F | DIASTOLIC BLOOD PRESSURE: 79 MMHG | SYSTOLIC BLOOD PRESSURE: 115 MMHG

## 2019-10-06 LAB — HGB BLD-MCNC: 12.2 G/DL (ref 13.3–17.7)

## 2019-10-06 PROCEDURE — 99238 HOSP IP/OBS DSCHRG MGMT 30/<: CPT | Performed by: INTERNAL MEDICINE

## 2019-10-06 PROCEDURE — 25000132 ZZH RX MED GY IP 250 OP 250 PS 637: Performed by: PHYSICIAN ASSISTANT

## 2019-10-06 PROCEDURE — 36415 COLL VENOUS BLD VENIPUNCTURE: CPT | Performed by: PHYSICIAN ASSISTANT

## 2019-10-06 PROCEDURE — 25000132 ZZH RX MED GY IP 250 OP 250 PS 637: Performed by: INTERNAL MEDICINE

## 2019-10-06 PROCEDURE — 97530 THERAPEUTIC ACTIVITIES: CPT | Mod: GP

## 2019-10-06 PROCEDURE — 85018 HEMOGLOBIN: CPT | Performed by: PHYSICIAN ASSISTANT

## 2019-10-06 RX ORDER — HYDROMORPHONE HYDROCHLORIDE 2 MG/1
2-4 TABLET ORAL
Qty: 40 TABLET | Refills: 0 | Status: SHIPPED | OUTPATIENT
Start: 2019-10-06 | End: 2019-10-11

## 2019-10-06 RX ORDER — AMOXICILLIN 250 MG
1 CAPSULE ORAL DAILY PRN
Qty: 30 TABLET | Refills: 1 | Status: SHIPPED | OUTPATIENT
Start: 2019-10-06 | End: 2019-10-11

## 2019-10-06 RX ORDER — METHOCARBAMOL 750 MG/1
750 TABLET, FILM COATED ORAL EVERY 6 HOURS PRN
Qty: 30 TABLET | Refills: 0 | Status: SHIPPED | OUTPATIENT
Start: 2019-10-06 | End: 2019-10-11

## 2019-10-06 RX ADMIN — SENNOSIDES AND DOCUSATE SODIUM 2 TABLET: 8.6; 5 TABLET ORAL at 09:35

## 2019-10-06 RX ADMIN — HYDROMORPHONE HYDROCHLORIDE 2 MG: 2 TABLET ORAL at 09:45

## 2019-10-06 RX ADMIN — RANITIDINE 150 MG: 150 TABLET ORAL at 09:35

## 2019-10-06 RX ADMIN — HYDROMORPHONE HYDROCHLORIDE 2 MG: 2 TABLET ORAL at 04:16

## 2019-10-06 RX ADMIN — ACETAMINOPHEN 975 MG: 325 TABLET, FILM COATED ORAL at 12:51

## 2019-10-06 RX ADMIN — ACETAMINOPHEN 975 MG: 325 TABLET, FILM COATED ORAL at 04:16

## 2019-10-06 RX ADMIN — GABAPENTIN 100 MG: 100 CAPSULE ORAL at 09:35

## 2019-10-06 RX ADMIN — HYDROMORPHONE HYDROCHLORIDE 2 MG: 2 TABLET ORAL at 12:51

## 2019-10-06 ASSESSMENT — ACTIVITIES OF DAILY LIVING (ADL)
ADLS_ACUITY_SCORE: 19

## 2019-10-06 NOTE — PLAN OF CARE
POD 2 from a L1-L2 lammy and removal of Indural mass. A&Ox4. CMS intact with 5/5 strength on assessment- Pt states he feels his LLE is weaker. Bowel sounds active, passing flatus, no BM yet. Tolerating a regular diet. VSS on RA. Dressing clean dry and intact. Up w/ SBA, GB and walker, wife has been assisting him. Voiding adequately in BR. C/o 7/10 throbbing posterior LLE pain, decreased with 2mg PO dilaudid and scheduled tylenol.  Pt scoring green on the Aggression Stop Light Tool. Plan to discharge today pending progress, continue to monitor.

## 2019-10-06 NOTE — PLAN OF CARE
POD 2 from a L1-L2 lammy and resection of intradural mass. A&O x4.  CMS intact. Bowel sounds active, positive flatus, tolerating regular diet. VSS on RA. Dressing removed by Neurosurg PA, new dressing placed at discharged. Up SBA. C/o pain to left leg, decreased with scheduled tylenol, PO dilaudid and ambulation. Pt scoring green on the Aggression Stop Light Tool. Discharge medications, instructions and follow up appointments reviewed with patient and spouse. Dressing teaching done with spouse. Patient discharged to home via wife with belongings.

## 2019-10-06 NOTE — PLAN OF CARE
Problem: Patient Care Overview    Patient plan for discharge: Home with spouse who is able to assist on stairs    Current status: Patient is a 64 year old male who is post op day 2 following L1-2 laminectomies and resectino of intradural mass.                   Bed Mobility: IND from supine to EOB with log roll technique. Claudette at bilateral LEs from EOB to supine, with wife present and watching, stating she can provide this assist at home.               Sit to/From Stand: The patient required SBA from EOB to standing at FWW on first attempt, progressing to modified IND.              Ambulation: Ambulated 100' with FWW and SBA, progressing to modified IND.              Stairs: Ascended/descended 9 steps with bilateral railings and SBA. Wife present, observing, able to provide assist at home.     Barriers to return to prior living situation:   Stairs    Recommendations for discharge:   FWW (issued and received)  Home with spouse and 24/7 assist on stairs.     Rationale for recommendations:   Patient demonstrates ability to safely ascend/descend stairs with wife present and will have 24/7 caregiver assist upon discharge. Encouraged wife to be present for household mobility for the first 3-5 days for safety.          Entered by: Jerri Ng 10/06/2019 11:24 AM

## 2019-10-06 NOTE — PLAN OF CARE
Physical Therapy Discharge Summary    Reason for therapy discharge:    Discharged to home.    Progress towards therapy goal(s). See goals on Care Plan in [facility] electronic health record for goal details.  Goals met    Therapy recommendation(s):    No further therapy is recommended.  Continue home exercise program.

## 2019-10-06 NOTE — PROGRESS NOTES
CYNDEE    D: SW met with patient and reviewed therapy notes of Home VS TCU. Patient reports that he will be dischargin to home as his spouse will be home 24/7 and he is feeling that he is near his baseline. Patient educated on refferal process for TCU, insurance coverage, and provided with list of TCU just in case he wanted to explore this as back up to option of going home. Patient pleasant and appreciative of SW involvement.     P: Pt plans to discharge home with assist from spouse.

## 2019-10-06 NOTE — DISCHARGE SUMMARY
[facility]    Hospitalist Discharge Summary       Date of Admission:  10/2/2019  Date of Discharge:  10/6/2019  Discharging Provider: Moustapha Sanchez MD      Discharge Diagnoses   Schwannoma  Acute on chronic back pain secondary to above  S/p L1-2 laminectomy and tumor resection    Follow-ups Needed After Discharge   Follow-up Appointments     Follow-up and recommended labs and tests       Follow up with primary care provider, Georgia Burleson, within 7 days   for hospital follow- up.   - Follow up with Hem Onc clinic regarding your tumor biopsy results  - Follow up with Neurosurgery clinic as scheduled           Unresulted Labs Ordered in the Past 30 Days of this Admission     Date and Time Order Name Status Description    10/4/2019 1321 Surgical pathology exam In process       These results will be followed up by PCP, neurosurgery clinic    Hospital Course   Summary: Shelia Ortiz is a 64 year old male with PMH HTN, CNS tumor who was admitted on 10/2/2019 with acute on chronic back pain. MRI 8/29/19 notes 2.6 x 1.1 cm intradural, extra medullary, peripherally enhancing cystic mass approximately at the level of L1-2. There is an additional enhancing mass at the level of the right S2 neural foramen and a subcentimeter enhancing nodule immediately superior to the index mass. 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 Underwent L1-L2 laminectomy and resection of intradural mass on 10/4/2019 with Neurosurgery. No postsurgical complications noted. Biopsy results positive for Schwannoma.    Consultations This Hospital Stay   SPINE SURGERY ADULT IP CONSULT  NEUROSURGERY IP CONSULT  OCCUPATIONAL THERAPY ADULT IP CONSULT  PHYSICAL THERAPY ADULT IP CONSULT    Code Status   Full Code    Time Spent on this Encounter   Moustapha HALEY, personally saw the patient today and spent approximately 25 minutes  discharging this patient.       Moustapha Sanchez MD  [facility]  ______________________________________________________________________    Physical Exam   Vital Signs: Temp: 97.6  F (36.4  C) Temp src: Oral BP: 115/79 Pulse: 98 Heart Rate: 97 Resp: 16 SpO2: 97 % O2 Device: None (Room air) Oxygen Delivery: 1 LPM  Weight: 172 lbs 1.6 oz    Constitutional: Male in mild pain laying on right side  HEENT: Eyes nonicteric, oral mucosa moist  Cardiovascular: RRR, normal S1/2, no m/r/g  Respiratory: CTAB, no wheezing or crackles  Vascular: No LE pitting edema  : Both testicles soft, no masses palpated, saleh present  Skin/Integumen: No rashes, back dressings noted c/d/i  Neuro/Psych: Appropriate affect and mood. A&Ox3, moves all extremities       Primary Care Physician   Georgia Burleson    Discharge Disposition   Discharged to home  Condition at discharge: Stable    Significant Results and Procedures   Most Recent 3 CBC's:  Recent Labs   Lab Test 10/06/19  0548 10/05/19  0737 10/03/19  0737 09/19/19  1545   WBC  --  12.3* 7.4 5.7   HGB 12.2* 12.7* 14.5 14.1   MCV  --  91 91 92   PLT  --  185 250 222     Most Recent 3 BMP's:  Recent Labs   Lab Test 10/05/19  0737 10/03/19  0737 09/19/19  1545    134 137   POTASSIUM 4.0 4.0 3.9   CHLORIDE 105 103 103   CO2 27 26 33*   BUN 22 17 16   CR 0.84 0.90 1.13   ANIONGAP 6 5 1*   JACQUE 8.6 9.4 9.0   * 162* 105*     Most Recent 2 LFT's:  Recent Labs   Lab Test 09/03/19  1256 01/18/19  0741   AST 22 23   ALT 45 38   ALKPHOS 69 101   BILITOTAL 0.8 0.5   ,   Results for orders placed or performed during the hospital encounter of 10/02/19   XR Surgery IDALIA L/T 5 Min Fluoro w Stills    Narrative    XR SURGERY C-ARM FLUOROSCOPY LESS THAN FIVE MINUTES WITH STILLS   10/4/2019 2:00 PM     COMPARISON: MRI of the lumbar spine 8/29/2019    HISTORY: L1-L2 laminectomies.     10 seconds fluoroscopy time. 1498-4014. Five images.    NUMBER OF IMAGES ACQUIRED:  5    VIEWS: Lateral views of the lumbar spine    FLUOROSCOPY TIME: .2 minutes.      Impression    IMPRESSION: The first image again demonstrates a transitional  lumbosacral segment, which on the previous MRI examination was called  a lumbarized S1. The next 2 images show linear instruments pointing  toward the upper and lower lumbar spine posterior elements (not enough  information in the field of view to determine which levels exactly).  The second to last image shows a linear metallic instrument pointing  towards the L4 vertebral body. The final image demonstrates a metallic  instrument pointing to the L3 superior endplate. Please note that no  intraoperative consultation of radiology was requested for this  examination. Please see the operative report for additional  information and real-time findings.    NIMO VILLAR MD       Discharge Orders      Physical Therapy Referral      Onc/Heme Adult Referral      Reason for your hospital stay    You were hospitalized for back pain and had spinal surgery     Activity    Your activity upon discharge: activity as tolerated     When to contact your care team    Call your neurosurgery clinic if you have any of the following: increased drainage or increased swelling.     Follow-up and recommended labs and tests     Follow up with primary care provider, Georgia Burleson, within 7 days for hospital follow- up.   - Follow up with Hem Onc clinic regarding your tumor biopsy results  - Follow up with Neurosurgery clinic as scheduled     Full Code     Diet    Follow this diet upon discharge:      Advance Diet as Tolerated: Regular Diet Adult     Discharge Medications   Current Discharge Medication List      START taking these medications    Details   HYDROmorphone (DILAUDID) 2 MG tablet Take 1-2 tablets (2-4 mg) by mouth every 3 hours as needed for moderate to severe pain  Qty: 40 tablet, Refills: 0    Associated Diagnoses: S/P laminectomy      methocarbamol (ROBAXIN) 750 MG  tablet Take 1 tablet (750 mg) by mouth every 6 hours as needed for muscle spasms  Qty: 30 tablet, Refills: 0    Associated Diagnoses: S/P laminectomy      order for DME Equipment being ordered: Walker Wheels () and Walker ()  Treatment Diagnosis: Impaired functional mobility  Qty: 1 each, Refills: 0    Associated Diagnoses: Chronic bilateral low back pain with left-sided sciatica      senna-docusate (SENOKOT-S/PERICOLACE) 8.6-50 MG tablet Take 1 tablet by mouth daily as needed for constipation  Qty: 30 tablet, Refills: 1    Associated Diagnoses: S/P laminectomy         STOP taking these medications       diclofenac (VOLTAREN) 75 MG EC tablet Comments:   Reason for Stopping:         HYDROcodone-acetaminophen (NORCO) 5-325 MG tablet Comments:   Reason for Stopping:         traMADol (ULTRAM) 50 MG tablet Comments:   Reason for Stopping:             Allergies   No Known Allergies

## 2019-10-06 NOTE — DISCHARGE INSTRUCTIONS
Spine and Brain [facility] at [facility]  Dr. Mittal Discharge Instructions Following Spine Surgery  [phone]  Monday - Friday; 8:00 AM - 4:00 PM    In General:   After you have had surgery on your spine, remember do not twist, or excessively flex or extend the area that you had surgery.  These activities can prevent healing.  Pain is normal and to be expected following surgery.  Please call our office to schedule your appointment follow up appointment.      Bowel Care:  Many people have constipation (hard stools) after surgery.  To help prevent constipation: Drink plenty of fluid (8-10 glasses/day); Eat more fiber, such as whole grain bread, bran cereal, and fruits and vegetables; Stay active by walking; Over the counter stool softener may also help.      Medications:  Spine surgery and pain management is unique to all patients.  You will generally be given medications for pain, muscle spasms or tightness, and for constipation during the immediate post op period.  It is important that you use these as prescribed.  Please remember to bring your pill bottles to all of your appointments. Avoid alcoholic beverages while taking narcotic pain medications. You can use ice to areas of pain as needed, 20 minutes at a time.  Changing positions and walking will help loosen your muscles as well.    Driving:  No driving while on narcotic pain medications.  It is state law not to drive while under the influence of a drug to a degree which renders you incapable of safely driving.  The narcotic medication you will be taking after surgery falls under this category.     Activity:   After surgery, most people feel less pain than they have had in a long time.  Walking and light activities will help you regain the use of your muscles.  You are encouraged to walk: start with short walks 5-10 minutes at a time for 4-5 times per day and increase as tolerated.  Stair climbing as tolerated, we recommend you use the railing.  No lifting greater than 10 pounds: approximately equal to one gallon of milk. No twisting, bending in the area you have had surgery. No housework, vacuuming, laundry, leaf raking, lawn mowing, or snow removal. Wear your brace (if ordered) as directed.    Showers:  If you have sutures or staples you may shower two days after surgery. It is ok to let water run over your incision but do not touch or scrub on the incision. Pat dry immediately after showering. If there is a dressing in place, you may remove it 2 days after surgery. If you were closed with Derma dumas (glue), you may shower without covering the incision. No baths, hot tubs, or pool activity for at least 6 weeks.     Nutrition:  In general, your diet restrictions will not change with your surgery.  You may need to eat small frequent meals initially until your appetite returns.  Eat plenty of high fiber foods and drink plenty of fluids. If you do not have a fluid restriction from or prior to surgery, we recommend 6-8 (8oz) glasses of water per day. Other fluids are fine, but water is best. Nausea is not uncommon; it is a common side effect to many pain medications.  We recommend that you take the pain medications with food, if this does not improve your symptoms, please call us.     Smoking:  For proper healing it is required that you quit using all tobacco products.  This includes smoking, chewing, nicotine gums, and nicotine patches.  Call Dr. Mittal if these occur: Drainage from your incision, increased pain/redness/swelling, temperatures greater than 101.5, increased leg pain or swelling or unrelieved headaches    Go to the nearest Emergency Room if you experience: chest pain, shortness of breath, neck swelling or swallowing problems      Referral placed for hem/oncology  Please call to make an appointment:   [facility]   [phone]      Please call Spine and Brain Clinic for staple removal in 2 week   [phone]

## 2019-10-06 NOTE — PROGRESS NOTES
[facility]    Neurosurgery  Daily Post-Op Note    Assessment & Plan   Procedure(s):  L1-L2 LAMINECTOMIES  AND RESECTION OF INTRADURAL MASS WITH SPINAL MONITORING   -2 Days Post-Op  Doing well.  Pain well-controlled.  Tolerating physical therapy and rehabilitation well.  Ambulating independently. Tolerating diet.     Plan:  -Advance activity as tolerated  -Continue supportive and symptomatic treatment  -home today. Instructions provided.   -call [phone] to schedule f/u appt, 2 weeks post op for staple removal.       Reynold Astudillo    Interval History   Stable.  Doing well.  Improving slowly.  Pain is reasonably controlled.  No fevers.     Physical Exam   Temp: 97.6  F (36.4  C) Temp src: Oral BP: 115/79 Pulse: 98 Heart Rate: 97 Resp: 16 SpO2: 97 % O2 Device: None (Room air) Oxygen Delivery: 1 LPM  Vitals:    10/02/19 1428 10/05/19 0631   Weight: 78 kg (172 lb) 78.1 kg (172 lb 1.6 oz)     Vital Signs with Ranges  Temp:  [97.6  F (36.4  C)-98.1  F (36.7  C)] 97.6  F (36.4  C)  Pulse:  [] 98  Heart Rate:  [] 97  Resp:  [14-18] 16  BP: (115-139)/(69-93) 115/79  SpO2:  [97 %-99 %] 97 %  I/O last 3 completed shifts:  In: 480 [P.O.:480]  Out: 1925 [Urine:1925]    Alert and oriented.  Moves all extremities equally.      Incision: CDI.Staples intact.       Medications     sodium chloride Stopped (10/05/19 1727)        acetaminophen  975 mg Oral Q8H     ranitidine  150 mg Oral Q12H    Or     famotidine  20 mg Intravenous Q12H     gabapentin  100 mg Oral TID     influenza vaccine adult (product based on age)  0.5 mL Intramuscular Prior to discharge     senna-docusate  1 tablet Oral BID    Or     senna-docusate  2 tablet Oral BID     sodium chloride (PF)  3 mL Intracatheter Q8H             Reynold Astudillo PA-C  [facility]

## 2019-10-07 ENCOUNTER — PATIENT OUTREACH (OUTPATIENT)
Dept: CARE COORDINATION | Facility: CLINIC | Age: 64
End: 2019-10-07

## 2019-10-07 ENCOUNTER — TELEPHONE (OUTPATIENT)
Dept: FAMILY MEDICINE | Facility: CLINIC | Age: 64
End: 2019-10-07

## 2019-10-07 ASSESSMENT — ACTIVITIES OF DAILY LIVING (ADL): DEPENDENT_IADLS:: INDEPENDENT

## 2019-10-07 NOTE — PROGRESS NOTES
"Clinic Care Coordination Contact    Clinic Care Coordination Contact  OUTREACH    Referral Information:  Referral Source: IP Report    Primary Diagnosis: Oncology  Chief Complaint   Patient presents with     Clinic Care Coordination - Post Hospital     RN   Universal Utilization: Inpatient at [facility] from 10/2/19 to 10/6/19 with CNS tumor, s/p L 1-2 laminectomy with tumor resection  Clinic Utilization  Difficulty keeping appointments:: No  Compliance Concerns: No  No-Show Concerns: No  No PCP office visit in Past Year: No  Utilization    Last refreshed: 10/7/2019  2:42 PM:  Hospital Admissions 1           Last refreshed: 10/7/2019  2:42 PM:  ED Visits 0           Last refreshed: 10/7/2019  2:42 PM:  No Show Count (past year) 0              Current as of: 10/7/2019  2:42 PM          Clinical Concerns:    Current Medical Concerns:  Patient returns care coordinator's call.    He states his pain is much, much better after his surgery. He is up and moving around the house without difficulty. He states he has a walker that he uses\"for safety\" if he goes out.     Patient states he still has some soreness and numbness in his left leg. This does come and go. It is getting better but slowly.     Patient states he is still waiting for the pathology report.        Current Behavioral Concerns: Denies concerns    Education Provided to patient: Role of care coordination.      Pain  Pain (GOAL):: No    Health Maintenance Reviewed: Due/Overdue   Health Maintenance Due   Topic Date Due     URINE DRUG SCREEN  1955     HIV SCREENING  06/16/1972     ZOSTER IMMUNIZATION (1 of 2) 06/16/2007     ADVANCE CARE PLANNING  01/30/2018     INFLUENZA VACCINE (1) 09/01/2019     Clinical Pathway: None    Medication Management:  Patient is knowledgeable about and independent in his medications. States he does not think he will need pain medication after tonight.      Functional Status:  Dependent ADLs:: Independent  Dependent " IADLs:: Independent  Bed or wheelchair confined:: No  Mobility Status: Independent  Fallen 2 or more times in the past year?: No  Any fall with injury in the past year?: No    Living Situation:  Current living arrangement:: I live in a private home with family. Patient has spouse and 20 year old daughter at home with him  Type of residence:: Private home - stairs    Diet/Exercise/Sleep:  Diet:: Regular  Inadequate nutrition (GOAL):: No  Food Insecurity: No  Exercise:: Currently not exercising  Inadequate activity/exercise (GOAL):: No  Significant changes in sleep pattern (GOAL): No    Transportation:  Transportation concerns (GOAL):: No  Transportation means:: Accessible car     Psychosocial:  Anglican or spiritual beliefs that impact treatment:: No  Mental health DX:: No  Mental health management concern (GOAL):: No  Informal Support system:: Spouse     Financial/Insurance:      Resources and Interventions:  Current Resources:    Community Resources: None  Supplies used at home:: None  Equipment Currently Used at Home: walker, rolling    Advance Care Plan/Directive  Advanced Care Plans/Directives on file:: No    Referrals Placed: None     Patient/Caregiver understanding: Patient has good understanding   Future Appointments              In 1 month Stefanie Stewart PA-C [facility]    In 3 months Taylor Cohn NP [facility] Neurosurgery [facility]      Plan: Patient will make an appointment with PCP for follow up and discussion of path report.   Patient will make an appointment with oncology.  Patient will call clinic care coordinator with questions or concerns.     Clinic care coordinator will follow up in one month to determine treatment plan for CNS tumor     Heather Butler RN, [facility] - Primary [facility] Clinic RN Coordinator  [facility]   10/7/2019    3:57 PM  [phone]

## 2019-10-07 NOTE — TELEPHONE ENCOUNTER
Patient discharged from Inpatient.      Discharge location: [facility]  Discharge date: 10/6/19  Diagnosis: Bilateral Low Back Pain With Left-Sided Sciatica, Chronic Bilateral Low Back Pain With Left-Sided Sciatica    Please follow up as appropriate. If no follow up required, please close encounter.      Lyubov SAUCEDA, Patient Care

## 2019-10-08 LAB — COPATH REPORT: NORMAL

## 2019-10-08 NOTE — TELEPHONE ENCOUNTER
ONCOLOGY INTAKE: Records Information      APPT INFORMATION: 11OCT19 Dr. Mirela Bateman  Referring provider:  Dr. Moustapha Sanchez  Referring provider s clinic:  [facility]  Reason for visit/diagnosis:  spinal tumor  Has patient been notified of appointment date and time?: Yes    RECORDS INFORMATION:  Were the records received with the referral (via [employer])? Internal Referral    Has patient been seen for any external appt for this diagnosis? No    If yes, where? NA    Has patient had any imaging or procedures outside of [place] for this condition? No      If Yes, where? NA    ADDITIONAL INFORMATION:  None

## 2019-10-09 ENCOUNTER — TELEPHONE (OUTPATIENT)
Dept: NEUROSURGERY | Facility: CLINIC | Age: 64
End: 2019-10-09

## 2019-10-09 DIAGNOSIS — Q85.03 SCHWANNOMATOSIS (H): Primary | ICD-10-CM

## 2019-10-09 NOTE — TELEPHONE ENCOUNTER
Called and informed patient, per Dr. Mittal... Pathology is grade 1, benign schwannoma (benign tumor of cells that make up the lining of neurons).  Given the complete resection, overall prognosis is excellent.  No other adjuvant treatment needed.  Will plan for repeat MRI at 3 month appointment to follow up.     Order placed for MRI in 3 months, patient will schedule appointments at 6 week follow up on 11/20/19

## 2019-10-11 ENCOUNTER — PRE VISIT (OUTPATIENT)
Dept: ONCOLOGY | Facility: CLINIC | Age: 64
End: 2019-10-11

## 2019-10-11 ENCOUNTER — ONCOLOGY VISIT (OUTPATIENT)
Dept: ONCOLOGY | Facility: CLINIC | Age: 64
End: 2019-10-11
Attending: PSYCHIATRY & NEUROLOGY
Payer: COMMERCIAL

## 2019-10-11 ENCOUNTER — TELEPHONE (OUTPATIENT)
Dept: ONCOLOGY | Facility: CLINIC | Age: 64
End: 2019-10-11

## 2019-10-11 VITALS
HEART RATE: 90 BPM | DIASTOLIC BLOOD PRESSURE: 83 MMHG | TEMPERATURE: 98.1 F | RESPIRATION RATE: 16 BRPM | OXYGEN SATURATION: 100 % | WEIGHT: 170 LBS | SYSTOLIC BLOOD PRESSURE: 139 MMHG | HEIGHT: 70 IN | BODY MASS INDEX: 24.34 KG/M2

## 2019-10-11 DIAGNOSIS — Z98.890 S/P LAMINECTOMY: ICD-10-CM

## 2019-10-11 DIAGNOSIS — Q85.03 SCHWANNOMATOSIS (H): ICD-10-CM

## 2019-10-11 DIAGNOSIS — D36.10 SCHWANNOMA: Primary | ICD-10-CM

## 2019-10-11 PROCEDURE — 99215 OFFICE O/P EST HI 40 MIN: CPT | Mod: 24 | Performed by: PSYCHIATRY & NEUROLOGY

## 2019-10-11 PROCEDURE — G0463 HOSPITAL OUTPT CLINIC VISIT: HCPCS | Mod: ZF

## 2019-10-11 RX ORDER — ACETAMINOPHEN 500 MG
500-1000 TABLET ORAL EVERY 6 HOURS PRN
COMMUNITY
End: 2020-08-10

## 2019-10-11 ASSESSMENT — PAIN SCALES - GENERAL: PAINLEVEL: NO PAIN (0)

## 2019-10-11 ASSESSMENT — MIFFLIN-ST. JEOR: SCORE: 1577.36

## 2019-10-11 NOTE — TELEPHONE ENCOUNTER
ONCOLOGY INTAKE: Records Information      APPT INFORMATION:  Referring provider:  Dr. Mirela Bateman  Referring provider s clinic:   Oncology Adult  Reason for visit/diagnosis:  Schwannoma [D36.10];Schwannomatosis (H) [Q85.03]  Has patient been notified of appointment date and time?: NA    RECORDS INFORMATION:  Were the records received with the referral (via [employer])? Internal Referral    ADDITIONAL INFORMATION:  Left VM with hours and phone. Letter sent for follow up. Referral in Epic

## 2019-10-11 NOTE — NURSING NOTE
"Oncology Rooming Note    October 11, 2019 12:54 PM   Shelia Ortiz is a 64 year old male who presents for:    Chief Complaint   Patient presents with     Oncology Clinic Visit     NEW; TJ spinal tumor     Initial Vitals: /83   Pulse 90   Temp 98.1  F (36.7  C) (Oral)   Resp 16   Ht 1.778 m (5' 10\")   Wt 77.1 kg (170 lb)   SpO2 100%   BMI 24.39 kg/m   Estimated body mass index is 24.39 kg/m  as calculated from the following:    Height as of this encounter: 1.778 m (5' 10\").    Weight as of this encounter: 77.1 kg (170 lb). Body surface area is 1.95 meters squared.  No Pain (0) Comment: Data Unavailable   No LMP for male patient.  Allergies reviewed: Yes  Medications reviewed: Yes    Medications: Medication refills not needed today.  Pharmacy name entered into [employer]: [id number] - [place], [place] - [address], [place] AT [facility]    Clinical concerns: No new concerns.       Lindsay Boateng CMA              "

## 2019-10-11 NOTE — PATIENT INSTRUCTIONS
Referral to genetics.     Topics discussed;  1. Schwannomatosis/ schwannomas   2. Benign familial tremor    Follow-up with Dr. Mittal's office for post-operative management.   Agree with Dr. Mittal's plan for repeat imaging in 3 months.     Mirela Bateman MD  Neuro-oncology  10/11/2019

## 2019-10-11 NOTE — LETTER
"     RE: Shelia Ortiz  [address]  [facility] [address]     Dear Colleague,    Thank you for referring your patient, Shelia Ortiz, to the [facility]. Please see a copy of my visit note below.    NEURO-ONCOLOGY INITIAL VISIT  Oct 11, 2019    CHIEF COMPLAINT: Mr. \"Javier\" Shelia Ortiz is a 64 year old right-handed man with likely multiple schwannomas of the spine, the largest one at L1-2, which was gross totally resected by Dr. Paco Mittal, neurosurgery at [place], on 10/4/2019. Pathology was consistent with schwannoma. He is presenting to this initial clinic visit as referred by Dr. Mittal for evaluation and recommendations on treatment.     Accompanying him to this visit is wife (\"Jason\").      HISTORY OF PRESENT ILLNESS  A summary of the patient s oncologic history is as follows;   -6/2019 PRESENTATION: Progressive, positional, severe throbbing low back pain, with radiation to the bilateral groin and anterior thighs.  -8/29/2019 MR L-spine imaging with a 2.6 x 1.1 cm intradural, extra medullary, peripherally  enhancing cystic mass at L1-2. There is an additional enhancing mass at the level of the right S2 neural foramen and a subcentimeter enhancing nodule immediately superior to the index mass. Multiple benign vertebral body hemangiomas.  -9/3/2019 MR C- and T-spine imaging with a small enhancing intradural extramedullary nodular lesion at T1. 2 mm intradural extramedullary enhancing nodule at the T9-T10.   -9/3/2019 MR brain imaging with no contrast enhancing lesions.   -10/4/2019 SURGERY: L1-2 laminectomy and gross total resection of L1-2 lesion by Dr. Mittal.   PATHOLOGY: Schwannoma.  -NEURO-ONC: Referral to genetics.     Today in clinic;   -Javier does complain of numbness of the left buttock and hip that has been improving since surgery. No weakness in the leg. Limited to no pain, this is improving as well.   -No numbness in the groin. Denies constipation (frequency of BM is " "improving since surgery). Denies retaining urine.   -Denies any headaches, abnormalities with vision, impairment in cognitive ability or difficulty with speaking or language.  -No hear loss/ changes.   -Denies any episodes concerning for a seizure, including unexplained episodes of loss of consciousness, unexplained falls, unexplained loss of bowel/ bladder control or tongue biting, unexplained bruising/ myalgia, periods of loss of time, or witnessed shaking/ jerking movements.   -Off all steroids.  -No strong family history of schwannomas; MGM with a life long \"neck mass\". No family history of early hearing loss.   -Notes a peripheral nerve schwannomas on the left arm, but nothing on the legs, head and neck region, or chest, abdomen, and pelvis.    REVIEW OF SYSTEMS  A comprehensive ROS negative except as in HPI.      MEDICATIONS   Current Outpatient Medications   Medication Sig Dispense Refill     acetaminophen (TYLENOL) 500 MG tablet Take 500-1,000 mg by mouth every 6 hours as needed for mild pain       DRUG ALLERGIES No Known Allergies       IMMUNIZATIONS   Immunization History   Administered Date(s) Administered     Influenza (IIV3) PF 10/11/2010, 12/08/2011     Influenza Vaccine, 3 YRS +, IM (QUADRIVALENT W/PRESERVATIVES) 10/08/2018, 11/30/2018     TDAP Vaccine (Adacel) 10/28/2009     PAST MEDICAL HISTORY   Past Medical History:   Diagnosis Date     Hyperlipidemia LDL goal <130      Sebaceous cyst 3/20/08    left posterior shoulder     PAST SURGICAL HISTORY   Past Surgical History:   Procedure Laterality Date     COLONOSCOPY  10/24/2013    Procedure: COLONOSCOPY;  Tubular adenoma of colon  pkt sent 10/7  rx sent  ;  Surgeon: Lenin Lopez MD;  Location: [facility]     COLONOSCOPY WITH CO2 INSUFFLATION N/A 1/29/2019    Procedure: COLONOSCOPY WITH CO2 INSUFFLATION;  Surgeon: Kaleb Morelos MD;  Location: [facility] OR     LAMINECTOMY LUMBAR ONE LEVEL N/A 10/4/2019    Procedure: L1-L2 LAMINECTOMIES  AND " "RESECTION OF INTRADURAL MASS WITH SPINAL MONITORING;  Surgeon: Paco Mittal MD;  Location: [facility] OR     NO HISTORY OF SURGERY       SOCIAL HISTORY   History   Smoking Status     Never Smoker   Smokeless Tobacco     Never Used    Social History    Substance and Sexual Activity      Alcohol use: No     History   Drug Use No   , 2 children.    FAMILY HISTORY   Family History   Problem Relation Age of Onset     Hypertension Mother          age 86-stroke      Diabetes Mother         developed in her 70s, now age 77 or 78     Cerebrovascular Disease Mother      Hypertension Father      Cancer - colorectal Father         age 74 or 75     Hypertension Brother      Asthma No family hx of      C.A.D. No family hx of      Breast Cancer No family hx of      Prostate Cancer No family hx of      Thyroid Disease No family hx of      Osteoporosis No family hx of        PHYSICAL EXAMINATION  /83   Pulse 90   Temp 98.1  F (36.7  C) (Oral)   Resp 16   Ht 1.778 m (5' 10\")   Wt 77.1 kg (170 lb)   SpO2 100%   BMI 24.39 kg/m      Wt Readings from Last 2 Encounters:   10/11/19 77.1 kg (170 lb)   10/05/19 78.1 kg (172 lb 1.6 oz)      Ht Readings from Last 2 Encounters:   10/11/19 1.778 m (5' 10\")   10/02/19 1.778 m (5' 10\")     KPS: 100    -Generally well appearing.  -Respiratory: Normal breath sounds, no audible wheezing.   -Skin: No rashes. Healing back incision, staples present. Left posterior arm with a schwannoma. No freckling in armpit. No cafe au lait spots.   -Hematologic/ lymphatic: No abnormal bruising. No leg swelling.  -Psychiatric: Normal mood and affect. Pleasant, talkative.  -Neurologic:   MENTAL STATUS:     Alert, oriented to date.    Recall: Immediate 3/3, delayed 1/3 improved to 3/3 with clues.    Speech fluent. Comprehension intact to multi-step commands.   Normal naming.   Good right-left orientation.     CRANIAL NERVES:     Pupils are equal, round, reactive to light.     Extraocular movements " full, patient denies diplopia.     Visual fields full.     Facial sensation intact to light touch.   Symmetric facial movements.   Hearing intact.   Palate moves symmetrically.     Sternocleidomastoid and trapezius strength intact.   Tongue midline.  MOTOR:    Normal and symmetric tone.   Grossly 5/5 throughout.    No pronation or drift. No orbiting.   Able to rise from a chair without use of arms.   On toe/ heel walk, equal distance from floor to heels/ toes.   Benign positional tremor on the right >> left (low amplitude, high frequency).    SENSATION:    Intact to light touch throughout (even in the left hip).  COORDINATION:   Intact finger-nose with eyes open and closed.   REFLEXES:    Normal to muted, and symmetric.    No grasp.    GAIT:  Walks without assistance.   No pain.    Good speed. Normal stride length and heel strike. Normal turns. Normal arm swing.   Able to toe, heel walk. Able to tandem walk.       MEDICAL RECORDS  Obtained and personally reviewed all available outside medical records in addition to reviewing any records available in our electronic system.     LABS  Personally reviewed all available lab results.     IMAGING  Personally reviewed MR brain and spine imaging.       IMPRESSION  Clinic was spent discussing in detail the nature of this tumor, providing emotional support, answering questions pertaining to my recommendations, and devising the treatment plan as outlined below.     It was explained to Javier that schwannomas are benign peripheral nerve sheath tumors. Treatment is resection alone for any lesion that is symptomatic. Therefore, no additional cancer-directed therapy is indicated.     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 Germline  mutations in the tumor suppressor genes SMARCB1 and also LZTR1 are thought to be responsible for a majority of familial cases, but a small proportion are sporadic. In addition to schwannomas, patients with schwannomatosis may also be at increased risk for other tumors such as meningiomas (more common) or malignant peripheral nerve sheath tumors (less common). As a result, recommend referral to genetics for potentially further testing. Javier endorses a maternal grandmother with a life long lump on her neck. He has 2 children.     Also discussed the likely diagnosis of benign positional familial tremor. Asymmetric; right > left. Mild, not interfering with day to day routine. Father with history of a tremor. Continue to monitor by primary care physician.      PROBLEM LIST  Schwannomatosis  Lumbar schwannoma  Back pain, resolved  L1 dermatomal numbness, resolved    PLAN  -CANCER-DIRECTED THERAPY-  -Referral to genetics.   -Agree with Dr. Mittal's plan of repeat imaging in 3 months + follow-up with him, though optimal type (region focused versus complete neuro-axis imaging) and frequency of surveillance scans has not been established in schwannomatosis. Some suggested performing MR brain + spine imaging every two to three years for asymptomatic lesions and annually (or sooner) for symptomatic lesions.    -PAIN-  -Well controlled with Tylenol PRN.     Return to clinic as needed.     Mirela Bateman MD  Neuro-oncology

## 2019-10-11 NOTE — PROGRESS NOTES
"NEURO-ONCOLOGY INITIAL VISIT  Oct 11, 2019    CHIEF COMPLAINT: Mr. \"Javier\" Shelia Ortiz is a 64 year old right-handed man with likely multiple schwannomas of the spine, the largest one at L1-2, which was gross totally resected by Dr. Paco Mittal, neurosurgery at [place], on 10/4/2019. Pathology was consistent with schwannoma. He is presenting to this initial clinic visit as referred by Dr. Mittal for evaluation and recommendations on treatment.     Accompanying him to this visit is wife (\"Jason\").      HISTORY OF PRESENT ILLNESS  A summary of the patient s oncologic history is as follows;   -6/2019 PRESENTATION: Progressive, positional, severe throbbing low back pain, with radiation to the bilateral groin and anterior thighs.  -8/29/2019 MR L-spine imaging with a 2.6 x 1.1 cm intradural, extra medullary, peripherally  enhancing cystic mass at L1-2. There is an additional enhancing mass at the level of the right S2 neural foramen and a subcentimeter enhancing nodule immediately superior to the index mass. Multiple benign vertebral body hemangiomas.  -9/3/2019 MR C- and T-spine imaging with a small enhancing intradural extramedullary nodular lesion at T1. 2 mm intradural extramedullary enhancing nodule at the T9-T10.   -9/3/2019 MR brain imaging with no contrast enhancing lesions.   -10/4/2019 SURGERY: L1-2 laminectomy and gross total resection of L1-2 lesion by Dr. Mittal.   PATHOLOGY: Schwannoma.  -NEURO-ONC: Referral to genetics.     Today in clinic;   -Javier does complain of numbness of the left buttock and hip that has been improving since surgery. No weakness in the leg. Limited to no pain, this is improving as well.   -No numbness in the groin. Denies constipation (frequency of BM is improving since surgery). Denies retaining urine.   -Denies any headaches, abnormalities with vision, impairment in cognitive ability or difficulty with speaking or language.  -No hear loss/ changes.   -Denies any episodes " "concerning for a seizure, including unexplained episodes of loss of consciousness, unexplained falls, unexplained loss of bowel/ bladder control or tongue biting, unexplained bruising/ myalgia, periods of loss of time, or witnessed shaking/ jerking movements.   -Off all steroids.  -No strong family history of schwannomas; MGM with a life long \"neck mass\". No family history of early hearing loss.   -Notes a peripheral nerve schwannomas on the left arm, but nothing on the legs, head and neck region, or chest, abdomen, and pelvis.    REVIEW OF SYSTEMS  A comprehensive ROS negative except as in HPI.      MEDICATIONS   Current Outpatient Medications   Medication Sig Dispense Refill     acetaminophen (TYLENOL) 500 MG tablet Take 500-1,000 mg by mouth every 6 hours as needed for mild pain       DRUG ALLERGIES No Known Allergies       IMMUNIZATIONS   Immunization History   Administered Date(s) Administered     Influenza (IIV3) PF 10/11/2010, 12/08/2011     Influenza Vaccine, 3 YRS +, IM (QUADRIVALENT W/PRESERVATIVES) 10/08/2018, 11/30/2018     TDAP Vaccine (Adacel) 10/28/2009     PAST MEDICAL HISTORY   Past Medical History:   Diagnosis Date     Hyperlipidemia LDL goal <130      Sebaceous cyst 3/20/08    left posterior shoulder     PAST SURGICAL HISTORY   Past Surgical History:   Procedure Laterality Date     COLONOSCOPY  10/24/2013    Procedure: COLONOSCOPY;  Tubular adenoma of colon  pkt sent 10/7  rx sent  ;  Surgeon: Lenin Lopez MD;  Location: [facility]     COLONOSCOPY WITH CO2 INSUFFLATION N/A 1/29/2019    Procedure: COLONOSCOPY WITH CO2 INSUFFLATION;  Surgeon: Kaleb Morelos MD;  Location: [facility] OR     LAMINECTOMY LUMBAR ONE LEVEL N/A 10/4/2019    Procedure: L1-L2 LAMINECTOMIES  AND RESECTION OF INTRADURAL MASS WITH SPINAL MONITORING;  Surgeon: Paco Mittal MD;  Location: [facility] OR     NO HISTORY OF SURGERY       SOCIAL HISTORY   History   Smoking Status     Never Smoker   Smokeless Tobacco     Never Used " "   Social History    Substance and Sexual Activity      Alcohol use: No     History   Drug Use No   , 2 children.    FAMILY HISTORY   Family History   Problem Relation Age of Onset     Hypertension Mother          age 86-stroke      Diabetes Mother         developed in her 70s, now age 77 or 78     Cerebrovascular Disease Mother      Hypertension Father      Cancer - colorectal Father         age 74 or 75     Hypertension Brother      Asthma No family hx of      C.A.D. No family hx of      Breast Cancer No family hx of      Prostate Cancer No family hx of      Thyroid Disease No family hx of      Osteoporosis No family hx of        PHYSICAL EXAMINATION  /83   Pulse 90   Temp 98.1  F (36.7  C) (Oral)   Resp 16   Ht 1.778 m (5' 10\")   Wt 77.1 kg (170 lb)   SpO2 100%   BMI 24.39 kg/m     Wt Readings from Last 2 Encounters:   10/11/19 77.1 kg (170 lb)   10/05/19 78.1 kg (172 lb 1.6 oz)      Ht Readings from Last 2 Encounters:   10/11/19 1.778 m (5' 10\")   10/02/19 1.778 m (5' 10\")     KPS: 100    -Generally well appearing.  -Respiratory: Normal breath sounds, no audible wheezing.   -Skin: No rashes. Healing back incision, staples present. Left posterior arm with a schwannoma. No freckling in armpit. No cafe au lait spots.   -Hematologic/ lymphatic: No abnormal bruising. No leg swelling.  -Psychiatric: Normal mood and affect. Pleasant, talkative.  -Neurologic:   MENTAL STATUS:     Alert, oriented to date.    Recall: Immediate 3/3, delayed 1/3 improved to 3/3 with clues.    Speech fluent. Comprehension intact to multi-step commands.   Normal naming.   Good right-left orientation.     CRANIAL NERVES:     Pupils are equal, round, reactive to light.     Extraocular movements full, patient denies diplopia.     Visual fields full.     Facial sensation intact to light touch.   Symmetric facial movements.   Hearing intact.   Palate moves symmetrically.     Sternocleidomastoid and trapezius strength " intact.   Tongue midline.  MOTOR:    Normal and symmetric tone.   Grossly 5/5 throughout.    No pronation or drift. No orbiting.   Able to rise from a chair without use of arms.   On toe/ heel walk, equal distance from floor to heels/ toes.   Benign positional tremor on the right >> left (low amplitude, high frequency).    SENSATION:    Intact to light touch throughout (even in the left hip).  COORDINATION:   Intact finger-nose with eyes open and closed.   REFLEXES:    Normal to muted, and symmetric.    No grasp.    GAIT:  Walks without assistance.   No pain.    Good speed. Normal stride length and heel strike. Normal turns. Normal arm swing.   Able to toe, heel walk. Able to tandem walk.       MEDICAL RECORDS  Obtained and personally reviewed all available outside medical records in addition to reviewing any records available in our electronic system.     LABS  Personally reviewed all available lab results.     IMAGING  Personally reviewed MR brain and spine imaging.       IMPRESSION  Clinic was spent discussing in detail the nature of this tumor, providing emotional support, answering questions pertaining to my recommendations, and devising the treatment plan as outlined below.     It was explained to Javier that schwannomas are benign peripheral nerve sheath tumors. Treatment is resection alone for any lesion that is symptomatic. Therefore, no additional cancer-directed therapy is indicated.     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 Germline mutations in the tumor suppressor genes SMARCB1 and also LZTR1 are thought to be responsible for a majority of familial cases, but a small proportion are sporadic. In addition to schwannomas, patients with schwannomatosis may also  be at increased risk for other tumors such as meningiomas (more common) or malignant peripheral nerve sheath tumors (less common). As a result, recommend referral to genetics for potentially further testing. Javier endorses a maternal grandmother with a life long lump on her neck. He has 2 children.     Also discussed the likely diagnosis of benign positional familial tremor. Asymmetric; right > left. Mild, not interfering with day to day routine. Father with history of a tremor. Continue to monitor by primary care physician.      PROBLEM LIST  Schwannomatosis  Lumbar schwannoma  Back pain, resolved  L1 dermatomal numbness, resolved    PLAN  -CANCER-DIRECTED THERAPY-  -Referral to genetics.   -Agree with Dr. Mittal's plan of repeat imaging in 3 months + follow-up with him, though optimal type (region focused versus complete neuro-axis imaging) and frequency of surveillance scans has not been established in schwannomatosis. Some suggested performing MR brain + spine imaging every two to three years for asymptomatic lesions and annually (or sooner) for symptomatic lesions.    -PAIN-  -Well controlled with Tylenol PRN.     Return to clinic as needed.     Mriela Bateman MD  Neuro-oncology

## 2019-10-14 ENCOUNTER — OFFICE VISIT (OUTPATIENT)
Dept: FAMILY MEDICINE | Facility: CLINIC | Age: 64
End: 2019-10-14
Payer: COMMERCIAL

## 2019-10-14 VITALS
RESPIRATION RATE: 16 BRPM | BODY MASS INDEX: 24.05 KG/M2 | HEIGHT: 70 IN | SYSTOLIC BLOOD PRESSURE: 148 MMHG | HEART RATE: 85 BPM | OXYGEN SATURATION: 99 % | WEIGHT: 168 LBS | TEMPERATURE: 98 F | DIASTOLIC BLOOD PRESSURE: 92 MMHG

## 2019-10-14 DIAGNOSIS — Q85.03 SCHWANNOMATOSIS (H): ICD-10-CM

## 2019-10-14 DIAGNOSIS — Z23 NEED FOR PROPHYLACTIC VACCINATION AND INOCULATION AGAINST INFLUENZA: ICD-10-CM

## 2019-10-14 DIAGNOSIS — Z98.890 S/P LAMINECTOMY: ICD-10-CM

## 2019-10-14 DIAGNOSIS — I10 ESSENTIAL HYPERTENSION: Primary | ICD-10-CM

## 2019-10-14 PROBLEM — M54.42 CHRONIC LEFT-SIDED LOW BACK PAIN WITH LEFT-SIDED SCIATICA: Status: RESOLVED | Noted: 2019-08-24 | Resolved: 2019-10-14

## 2019-10-14 PROBLEM — G89.29 CHRONIC LEFT-SIDED LOW BACK PAIN WITH LEFT-SIDED SCIATICA: Status: RESOLVED | Noted: 2019-08-24 | Resolved: 2019-10-14

## 2019-10-14 PROCEDURE — 90471 IMMUNIZATION ADMIN: CPT | Performed by: FAMILY MEDICINE

## 2019-10-14 PROCEDURE — 99214 OFFICE O/P EST MOD 30 MIN: CPT | Mod: 25 | Performed by: FAMILY MEDICINE

## 2019-10-14 PROCEDURE — 90686 IIV4 VACC NO PRSV 0.5 ML IM: CPT | Performed by: FAMILY MEDICINE

## 2019-10-14 RX ORDER — METOPROLOL SUCCINATE 25 MG/1
25 TABLET, EXTENDED RELEASE ORAL DAILY
Qty: 30 TABLET | Refills: 1 | Status: SHIPPED | OUTPATIENT
Start: 2019-10-14 | End: 2019-11-03

## 2019-10-14 ASSESSMENT — MIFFLIN-ST. JEOR: SCORE: 1568.29

## 2019-10-14 NOTE — PROGRESS NOTES
Subjective     Shelia Ortiz is a 64 year old male who presents to clinic today for the following health issues:    HPI     Patient would like to discuss hypertension with provider. He is noticed that his readings have elevated.     Hospital Follow-up Visit:    Hospital/Nursing Home/IP Rehab Facility: [facility]  Date of Admission: 10/2/19  Date of Discharge: 10/6/19  Reason(s) for Admission: Acute on chronic back pain secondary to underlying CNS tumor.  S/p L1-2 laminectomy and tumor resection.            Problems taking medications regularly:  None       Medication changes since discharge:   Patient reported not taking any discharged medications except Tylenol taking 1 tablet instead of 2.  START taking these medications     Details   HYDROmorphone (DILAUDID) 2 MG tablet Take 1-2 tablets (2-4 mg) by mouth every 3 hours as needed for moderate to severe pain  Qty: 40 tablet, Refills: 0     Associated Diagnoses: S/P laminectomy       methocarbamol (ROBAXIN) 750 MG tablet Take 1 tablet (750 mg) by mouth every 6 hours as needed for muscle spasms  Qty: 30 tablet, Refills: 0     Associated Diagnoses: S/P laminectomy       order for DME Equipment being ordered: Walker Wheels () and Walker ()  Treatment Diagnosis: Impaired functional mobility  Qty: 1 each, Refills: 0     Associated Diagnoses: Chronic bilateral low back pain with left-sided sciatica       senna-docusate (SENOKOT-S/PERICOLACE) 8.6-50 MG tablet Take 1 tablet by mouth daily as needed for constipation  Qty: 30 tablet, Refills: 1     Associated Diagnoses: S/P laminectomy                STOP taking these medications         diclofenac (VOLTAREN) 75 MG EC tablet Comments:   Reason for Stopping:            HYDROcodone-acetaminophen (NORCO) 5-325 MG tablet Comments:   Reason for Stopping:            traMADol (ULTRAM) 50 MG tablet Comments:   Reason for Stopping:             Problems adhering to non-medication therapy:  None    Summary of  "hospitalization:  [facility] discharge summary reviewed  Diagnostic Tests/Treatments reviewed.  Follow up needed: none  Other Healthcare Providers Involved in Patient s Care:         None  Update since discharge: improved.     Post Discharge Medication Reconciliation: discharge medications reconciled and changed, per note/orders (see AVS).  Plan of care communicated with patient     Coding guidelines for this visit:  Type of Medical   Decision Making Face-to-Face Visit       within 7 Days of discharge Face-to-Face Visit        within 14 days of discharge   Moderate Complexity [id number]   High Complexity [id number]                BP Readings from Last 3 Encounters:   10/14/19 (!) 138/92   10/11/19 139/83   10/06/19 115/79    Wt Readings from Last 3 Encounters:   10/14/19 76.2 kg (168 lb)   10/11/19 77.1 kg (170 lb)   10/05/19 78.1 kg (172 lb 1.6 oz)                      Reviewed and updated as needed this visit by Provider  Tobacco  Allergies  Meds  Med Hx  Surg Hx  Fam Hx  Soc Hx        Review of Systems   ROS COMP: Constitutional, HEENT, cardiovascular, pulmonary, gi and gu systems are negative, except as otherwise noted.      Objective    BP (!) 148/92   Pulse 85   Temp 98  F (36.7  C) (Oral)   Resp 16   Ht 1.778 m (5' 10\")   Wt 76.2 kg (168 lb)   SpO2 99%   BMI 24.11 kg/m    Body mass index is 24.11 kg/m .  Physical Exam   GENERAL: healthy, alert and no distress  NECK: no adenopathy, no asymmetry, masses, or scars and thyroid normal to palpation  RESP: lungs clear to auscultation - no rales, rhonchi or wheezes  CV: regular rate and rhythm, normal S1 S2, no S3 or S4, no murmur, click or rub, no peripheral edema and peripheral pulses strong  MS: no gross musculoskeletal defects noted, no edema  SKIN: no suspicious lesions or rashes and incision site with staples in place.  Mild ecchymosis surrounding incision site resolving.  NEURO: Normal strength and tone, mentation intact and speech " normal  BACK: no CVA tenderness, no paralumbar tenderness    Diagnostic Test Results:  Labs reviewed in Epic        Assessment & Plan     1. Essential hypertension  Begin low dose Bblocker for BP control.    - metoprolol succinate ER (TOPROL-XL) 25 MG 24 hr tablet; Take 1 tablet (25 mg) by mouth daily  Dispense: 30 tablet; Refill: 1    2. S/P laminectomy  3. Schwannomatosis (H)  excision of inrtadural tumor.  Recovering well.  Follow up with surgeon as planned  11/20/19.  Follow up MRI in 3 months planned - ordered by neurosurgery.      4. Need for prophylactic vaccination and inoculation against influenza  - INFLUENZA VACCINE IM > 6 MONTHS VALENT IIV4 [81411]  - Vaccine Administration, Initial [81392]       Patient Instructions     Your incision site looks good.  The bruising will resolve over time.    Begin Metoprolol 25 mg daily in AM.  Flu shot today.                  Return in about 2 weeks (around 10/28/2019) for BP Recheck with Medical Assistant if normal at home..    Jannet Mijares MD  [facility]

## 2019-10-14 NOTE — PATIENT INSTRUCTIONS
Your incision site looks good.  The bruising will resolve over time.    Begin Metoprolol 25 mg daily in AM.  Flu shot today.      At [facility], we strive to deliver an exceptional experience to you, every time we see you.  If you receive a survey in the mail, please send us back your thoughts. We really do value your feedback.    Your care team:     Family Medicine   WADE Hernandez MD Emily Bunt, APRN CNP   S. MD Jannet Rao MD Angela Wermerskirchen, MD         Clinic hours: Monday - Wednesday 7 am-7 pm   Thursdays and Fridays 7 am-5 pm.     [facility]: Monday - Friday 11 am-9 pm,   Saturday and Sunday 9 am-5 pm.    [facility]: Monday -Thursday 8 am-8 pm; Friday 8 am-6 pm; Saturday and Sunday 9 am-5 pm.     [facility]: Monday - Thursday 8 am - 7 pm; Friday 8 am - 6 pm    Clinic: [phone]   [facility]: [phone]   [facility]: [phone]

## 2019-10-18 ENCOUNTER — OFFICE VISIT (OUTPATIENT)
Dept: NEUROSURGERY | Facility: CLINIC | Age: 64
End: 2019-10-18
Attending: PHYSICIAN ASSISTANT
Payer: COMMERCIAL

## 2019-10-18 VITALS
DIASTOLIC BLOOD PRESSURE: 90 MMHG | HEART RATE: 65 BPM | TEMPERATURE: 97.8 F | HEIGHT: 70 IN | WEIGHT: 166 LBS | OXYGEN SATURATION: 99 % | BODY MASS INDEX: 23.77 KG/M2 | RESPIRATION RATE: 16 BRPM | SYSTOLIC BLOOD PRESSURE: 149 MMHG

## 2019-10-18 DIAGNOSIS — Q85.03 SCHWANNOMATOSIS (H): Primary | ICD-10-CM

## 2019-10-18 PROCEDURE — 99207 ZZC NO CHARGE NURSE ONLY: CPT

## 2019-10-18 PROCEDURE — 40000269 ZZH STATISTIC NO CHARGE FACILITY FEE

## 2019-10-18 ASSESSMENT — MIFFLIN-ST. JEOR: SCORE: 1559.22

## 2019-10-18 ASSESSMENT — PAIN SCALES - GENERAL: PAINLEVEL: NO PAIN (1)

## 2019-10-18 NOTE — LETTER
10/18/2019         RE: Shelia Ortiz  [address]  [place] [facility] [address]        Dear Colleague,    Thank you for referring your patient, Shelia Ortiz, to the [facility]. Please see a copy of my visit note below.    Nurse Suture/Staple removal visit note:  Pain  Patient presents today s/p L1-2 laminectomy and resection of intradural mass per the order of Dr. Mittal. Pain rates 2/10 in his back, also has pulsating pain down L leg especially at night, pt had similar pain before surgery. Patient taking 1 tablet of Tylenol per day. Patient stopped taking Oxycodone about a week ago along with muscle relaxant.     Incision  Wound is healing nicely without erythema, fluctuation, induration, drainage, or fever. Incision edges well approximated without signs of infection. Staples removed by writer. Steri-strips applied. Patient tolerated with minimal discomfort.    Assessment/Patient Questions  Denies numbness/tingling to bilateral lower extremities. Denies weakness. Able to dorsi-flex and plantar flex with equal strength.  All questions answered.    Instructions for Patient    Keep your incision clean and dry at all times. Steri strips applied and should remain in place for 7-10 days.    No bathing, swimming, or submerging in water until incision is well healed.      No lifting greater than 10-15 pounds. No bending, twisting, or overhead reaching.    Return in 4 weeks on 11/20 for follow up appointment.    Weaning from narcotic pain medications: When it is time, start weaning by extending the time between doses. For example, if you're taking 2 tabs every 4 hours, spread it out to 2 tabs over 4.5, 5, 6 hours. At that point you can certainly cut down to 1 tab, then wean to an as needed basis until completely done with them.     For refills, please call our clinic. A nurse will call you back to obtain a pain assessment. Please call 3-4 business days before you run out so we can ensure  there is a provider available at the location you prefer for .    Call the clinic or go to Emergency Room if you develop any new pain, drainage, swelling, or fever. Go to the Emergency Room if sudden onset of severe headache, weakness, confusion, change in level of consciousness, pain, or loss of movement.    Post-operative appointments: Arrive 30 minutes before your 6 week post op, 3 months post op, 6 months post op, 1 year post-op appointments to allow time for an x-ray before each.    Please call clinic with any further questions or concerns    DESMOND Jensen  [facility]  [address] [address]  T:  [phone]  F:  [phone]      Again, thank you for allowing me to participate in the care of your patient.        Sincerely,         Neurosurgery Nurse

## 2019-10-18 NOTE — TELEPHONE ENCOUNTER
ONCOLOGY INTAKE: Records Information      APPT INFORMATION:  Referring provider:  Dr Mirela Bateman  Referring provider s clinic:   Oncology  Reason for visit/diagnosis:  personal history of    Schwannoma        Has patient been notified of appointment date and time?: yes    RECORDS INFORMATION:  Were the records received with the referral (via [employer])? No, internal referral

## 2019-10-18 NOTE — PROGRESS NOTES
Nurse Suture/Staple removal visit note:  Pain  Patient presents today s/p L1-2 laminectomy and resection of intradural mass per the order of Dr. Mittal. Pain rates 2/10 in his back, also has pulsating pain down L leg especially at night, pt had similar pain before surgery. Patient taking 1 tablet of Tylenol per day. Patient stopped taking Oxycodone about a week ago along with muscle relaxant.     Incision  Wound is healing nicely without erythema, fluctuation, induration, drainage, or fever. Incision edges well approximated without signs of infection. Staples removed by writer. Steri-strips applied. Patient tolerated with minimal discomfort.    Assessment/Patient Questions  Denies numbness/tingling to bilateral lower extremities. Denies weakness. Able to dorsi-flex and plantar flex with equal strength.  All questions answered.    Instructions for Patient    Keep your incision clean and dry at all times. Steri strips applied and should remain in place for 7-10 days.    No bathing, swimming, or submerging in water until incision is well healed.      No lifting greater than 10-15 pounds. No bending, twisting, or overhead reaching.    Return in 4 weeks on 11/20 for follow up appointment.    Weaning from narcotic pain medications: When it is time, start weaning by extending the time between doses. For example, if you're taking 2 tabs every 4 hours, spread it out to 2 tabs over 4.5, 5, 6 hours. At that point you can certainly cut down to 1 tab, then wean to an as needed basis until completely done with them.     For refills, please call our clinic. A nurse will call you back to obtain a pain assessment. Please call 3-4 business days before you run out so we can ensure there is a provider available at the location you prefer for .    Call the clinic or go to Emergency Room if you develop any new pain, drainage, swelling, or fever. Go to the Emergency Room if sudden onset of severe headache, weakness, confusion, change  in level of consciousness, pain, or loss of movement.    Post-operative appointments: Arrive 30 minutes before your 6 week post op, 3 months post op, 6 months post op, 1 year post-op appointments to allow time for an x-ray before each.    Please call clinic with any further questions or concerns    DESMOND Jensen  [facility]  [place] [address]  [place] [address]  T:  [phone]  F:  [phone]

## 2019-10-18 NOTE — PATIENT INSTRUCTIONS
Instructions for Patient      Keep your incision clean and dry at all times. Steri strips applied and should remain in place for 7-10 days.    No bathing, swimming, or submerging in water until incision is well healed.      No lifting greater than 10-15 pounds. No bending, twisting, or overhead reaching.    Return in 4 weeks on 11/20 for follow up appointment with Stefanie Stewart PA-C.    Weaning from narcotic pain medications: When it is time, start weaning by extending the time between doses. For example, if you're taking 2 tabs every 4 hours, spread it out to 2 tabs over 4.5, 5, 6 hours. At that point you can certainly cut down to 1 tab, then wean to an as needed basis until completely done with them.     For refills, please call our clinic. A nurse will call you back to obtain a pain assessment. Please call 3-4 business days before you run out so we can ensure there is a provider available at the location you prefer for .    Call the clinic or go to Emergency Room if you develop any new pain, drainage, swelling, or fever. Go to the Emergency Room if sudden onset of severe headache, weakness, confusion, change in level of consciousness, pain, or loss of movement.    Post-operative appointments: Arrive 30 minutes before your 6 week post op, 3 months post op, 6 months post op, 1 year post-op appointments to allow time for an x-ray before each.    Please call clinic with any further questions or concerns    DESMOND Jensen  [facility]  [address] [address]  T:  [phone]  F:  [phone]

## 2019-10-18 NOTE — NURSING NOTE
"Shelia Ortiz is a 64 year old male who presents for:  Chief Complaint   Patient presents with     Surgical Followup        Initial Vitals:  BP (!) 149/90   Pulse 65   Temp 97.8  F (36.6  C) (Oral)   Resp 16   Ht 5' 10\" (1.778 m)   Wt 166 lb (75.3 kg)   SpO2 99%   BMI 23.82 kg/m   Estimated body mass index is 23.82 kg/m  as calculated from the following:    Height as of this encounter: 5' 10\" (1.778 m).    Weight as of this encounter: 166 lb (75.3 kg).. Body surface area is 1.93 meters squared. BP completed using cuff size: regular  No Pain (1)        Nursing Comments: Pt present today for post op follow up.        Herrera Grey CMA    "

## 2019-10-31 ENCOUNTER — ALLIED HEALTH/NURSE VISIT (OUTPATIENT)
Dept: NURSING | Facility: CLINIC | Age: 64
End: 2019-10-31
Payer: COMMERCIAL

## 2019-10-31 VITALS — SYSTOLIC BLOOD PRESSURE: 136 MMHG | DIASTOLIC BLOOD PRESSURE: 86 MMHG

## 2019-10-31 DIAGNOSIS — I10 ESSENTIAL HYPERTENSION: ICD-10-CM

## 2019-10-31 DIAGNOSIS — R03.0 ELEVATED BLOOD PRESSURE READING WITHOUT DIAGNOSIS OF HYPERTENSION: Primary | ICD-10-CM

## 2019-10-31 PROCEDURE — 99207 ZZC NO CHARGE NURSE ONLY: CPT

## 2019-10-31 NOTE — NURSING NOTE
Shelia Ortiz is a 64 year old patient who comes in today for a Blood Pressure check.  Initial BP:  /86      Data Unavailable  Disposition: results routed to provider

## 2019-11-03 RX ORDER — METOPROLOL SUCCINATE 25 MG/1
25 TABLET, EXTENDED RELEASE ORAL DAILY
Qty: 90 TABLET | Refills: 1 | Status: SHIPPED | OUTPATIENT
Start: 2019-11-03 | End: 2020-05-07

## 2019-11-08 ENCOUNTER — PATIENT OUTREACH (OUTPATIENT)
Dept: CARE COORDINATION | Facility: CLINIC | Age: 64
End: 2019-11-08

## 2019-11-08 ENCOUNTER — HEALTH MAINTENANCE LETTER (OUTPATIENT)
Age: 64
End: 2019-11-08

## 2019-11-08 NOTE — PROGRESS NOTES
Clinic Care Coordination Contact    Situation: Patient chart reviewed by care coordinator.  Left message on voicemail for return call    Background: 64 year old male post laminectomy due to CNS tumor    Assessment: Tumor was benign. Patient is diagnosed with schwannomatosis.   Patient has done well with healing. He has no numbness to extremities.      Patient met with oncology. He has additional lesions. These will be continuously monitored and removed as needed.     Plan/Recommendations: Will close to clinic care coordination at this time.     Heather Butler RN, [facility] RN Coordinator  [facility]   11/8/2019    1:40 PM  [phone]

## 2019-11-12 ENCOUNTER — PRE VISIT (OUTPATIENT)
Dept: ONCOLOGY | Facility: CLINIC | Age: 64
End: 2019-11-12

## 2019-12-02 ENCOUNTER — OFFICE VISIT (OUTPATIENT)
Dept: SURGERY | Facility: CLINIC | Age: 64
End: 2019-12-02
Payer: COMMERCIAL

## 2019-12-02 VITALS
WEIGHT: 166 LBS | BODY MASS INDEX: 23.77 KG/M2 | SYSTOLIC BLOOD PRESSURE: 151 MMHG | HEART RATE: 65 BPM | HEIGHT: 70 IN | DIASTOLIC BLOOD PRESSURE: 103 MMHG

## 2019-12-02 DIAGNOSIS — Z90.49 S/P LAPAROSCOPIC APPENDECTOMY: Primary | ICD-10-CM

## 2019-12-02 PROCEDURE — 99024 POSTOP FOLLOW-UP VISIT: CPT | Performed by: SURGERY

## 2019-12-02 ASSESSMENT — MIFFLIN-ST. JEOR: SCORE: 1559.22

## 2019-12-02 NOTE — LETTER
"    12/2/2019         RE: Shelia Ortiz  [address]  [place] [facility] [address]        Dear Colleague,    Thank you for referring your patient, Shelia Ortiz, to the [facility]. Please see a copy of my visit note below.    General Surgery Post Op    Pt returns for follow up visit s/p lap appendectomy (perforated) on 11/19/19. Postop ileus, discharged on 11/24/19    Patient has been doing well, tolerating diet. Bowels moving well and normally. Pain controlled. No issues with wound healing/redness/drainage. No fevers.  Bruising resolving  Not needing any pain medication  Mainly just gets fatigued very quickly    Physical exam: Vitals: BP (!) 151/103   Pulse 65   Ht 1.778 m (5' 10\")   Wt 75.3 kg (166 lb)   BMI 23.82 kg/m     BMI= Body mass index is 23.82 kg/m .    Exam:  Constitutional: healthy, alert and no distress  Gastrointestinal: Abdomen soft, non-tender. BS normal. No masses, organomegaly  Incisions healing well no infection. Periumbilical bruising resolving    Path:  Final Diagnosis Appendix, appendectomy-Marked acute appendicitis and serositis with focal perforation     Clinical Information appendicitis     Gross Description Appendix: A 8 cm appendix. The serosal surface is hemorrhagic with exudate. There is a focal perforation near the tip. Sectioning reveals purulent and fecal material in the lumen. There is no tumor in the tip. RS-2 (SES)          Assessment:     ICD-10-CM    1. S/P laparoscopic appendectomy Z90.49      Plan: Doing well. Easy fatigue expected now with 2 surgeries fairly close together (back was ~1.5 weeks ago). Advised making sure good protein in diet. Plans to return to work 12/16/19. No restrictions from my point of view. Follow up with me as needed.    Kaleb Morelos MD        Again, thank you for allowing me to participate in the care of your patient.        Sincerely,        Kaleb Morelos MD    "

## 2019-12-02 NOTE — PROGRESS NOTES
"General Surgery Post Op    Pt returns for follow up visit s/p lap appendectomy (perforated) on 11/19/19. Postop ileus, discharged on 11/24/19    Patient has been doing well, tolerating diet. Bowels moving well and normally. Pain controlled. No issues with wound healing/redness/drainage. No fevers.  Bruising resolving  Not needing any pain medication  Mainly just gets fatigued very quickly    Physical exam: Vitals: BP (!) 151/103   Pulse 65   Ht 1.778 m (5' 10\")   Wt 75.3 kg (166 lb)   BMI 23.82 kg/m    BMI= Body mass index is 23.82 kg/m .    Exam:  Constitutional: healthy, alert and no distress  Gastrointestinal: Abdomen soft, non-tender. BS normal. No masses, organomegaly  Incisions healing well no infection. Periumbilical bruising resolving    Path:  Final Diagnosis Appendix, appendectomy-Marked acute appendicitis and serositis with focal perforation     Clinical Information appendicitis     Gross Description Appendix: A 8 cm appendix. The serosal surface is hemorrhagic with exudate. There is a focal perforation near the tip. Sectioning reveals purulent and fecal material in the lumen. There is no tumor in the tip. RS-2 (SES)          Assessment:     ICD-10-CM    1. S/P laparoscopic appendectomy Z90.49      Plan: Doing well. Easy fatigue expected now with 2 surgeries fairly close together (back was ~1.5 weeks ago). Advised making sure good protein in diet. Plans to return to work 12/16/19. No restrictions from my point of view. Follow up with me as needed.    Kaleb Morelos MD      "

## 2019-12-03 ENCOUNTER — OFFICE VISIT (OUTPATIENT)
Dept: NEUROSURGERY | Facility: CLINIC | Age: 64
End: 2019-12-03
Payer: COMMERCIAL

## 2019-12-03 VITALS
BODY MASS INDEX: 24.11 KG/M2 | HEIGHT: 70 IN | DIASTOLIC BLOOD PRESSURE: 93 MMHG | WEIGHT: 168.4 LBS | OXYGEN SATURATION: 100 % | SYSTOLIC BLOOD PRESSURE: 153 MMHG | HEART RATE: 87 BPM

## 2019-12-03 DIAGNOSIS — Z98.890 S/P LAMINECTOMY: Primary | ICD-10-CM

## 2019-12-03 PROCEDURE — 99024 POSTOP FOLLOW-UP VISIT: CPT | Performed by: NURSE PRACTITIONER

## 2019-12-03 ASSESSMENT — PAIN SCALES - GENERAL: PAINLEVEL: NO PAIN (0)

## 2019-12-03 ASSESSMENT — MIFFLIN-ST. JEOR: SCORE: 1570.11

## 2019-12-03 NOTE — PATIENT INSTRUCTIONS
- May increase lifting restriction and activity as tolerated.      - Follow up in 6 weeks.      - Call the clinic at [phone] for increased pain or any other questions and concerns.    - VINCENT to follow up with Primary Care provider regarding elevated blood pressure.

## 2019-12-03 NOTE — LETTER
"    12/3/2019         RE: Shelia Ortiz  [address]  [place] [facility] [address]        Dear Colleague,    Thank you for referring your patient, Shelia Ortiz, to the [facility]. Please see a copy of my visit note below.    [facility]  Neurosurgery Follow Up:    HPI:  8 weeks s/p L1-2 laminectomy for resection of intradural mass. Doing well. Denies back pain and radicular pain. Notes some mild left buttock numbness. No weakness. He is scheduled for a follow up MRI on Friday.    Medical, surgical, family, and social history unchanged since prior exam.  Exam:  Constitutional:  Alert, well nourished, NAD.  HEENT: Normocephalic, atraumatic.   Pulm:  Without shortness of breath   CV:  No pitting edema of BLE.      Vital Signs:  BP (!) 153/93   Pulse 87   Ht 5' 10\" (1.778 m)   Wt 168 lb 6.4 oz (76.4 kg)   SpO2 100%   BMI 24.16 kg/m       Neurological:  Awake  Alert  Oriented x 3  Motor exam:        IP Q DF PF EHL  R   5  5   5   5    5  L   5  5   5   5    5     Able to spontaneously move L/E bilaterally  Sensation intact throughout all L/E dermatomes     Incisions:  Healed nicely.    A/P:  8 weeks s/p L1-2 laminectomy for resection of intradural mass. Doing well. Denies back pain and radicular pain. Notes some mild left buttock numbness. No weakness. He is scheduled for a follow up MRI on Friday. Our clinic will call with the results. May increase weight restriction as tolerated. Follow up in 6 weeks. He verbalized understanding and agreement.    Patient Instructions   - May increase lifting restriction and activity as tolerated.      - Follow up in 6 weeks.      - Call the clinic at [phone] for increased pain or any other questions and concerns.    - VINCENT to follow up with Primary Care provider regarding elevated blood pressure.      Taylor Cohn CNP  [facility]  [address] [place] [address]  [address]  [place] [address]  Tel [phone]  Fax " [phone]        Again, thank you for allowing me to participate in the care of your patient.        Sincerely,        Taylor Cohn NP

## 2019-12-03 NOTE — PROGRESS NOTES
"[facility] Neurosurgery  Neurosurgery Follow Up:    HPI:  8 weeks s/p L1-2 laminectomy for resection of intradural mass. Doing well. Denies back pain and radicular pain. Notes some mild left buttock numbness. No weakness. He is scheduled for a follow up MRI on Friday.    Medical, surgical, family, and social history unchanged since prior exam.  Exam:  Constitutional:  Alert, well nourished, NAD.  HEENT: Normocephalic, atraumatic.   Pulm:  Without shortness of breath   CV:  No pitting edema of BLE.      Vital Signs:  BP (!) 153/93   Pulse 87   Ht 5' 10\" (1.778 m)   Wt 168 lb 6.4 oz (76.4 kg)   SpO2 100%   BMI 24.16 kg/m      Neurological:  Awake  Alert  Oriented x 3  Motor exam:        IP Q DF PF EHL  R   5  5   5   5    5  L   5  5   5   5    5     Able to spontaneously move L/E bilaterally  Sensation intact throughout all L/E dermatomes     Incisions:  Healed nicely.    A/P:  8 weeks s/p L1-2 laminectomy for resection of intradural mass. Doing well. Denies back pain and radicular pain. Notes some mild left buttock numbness. No weakness. He is scheduled for a follow up MRI on Friday. Our clinic will call with the results. May increase weight restriction as tolerated. Follow up in 6 weeks. He verbalized understanding and agreement.    Patient Instructions   - May increase lifting restriction and activity as tolerated.      - Follow up in 6 weeks.      - Call the clinic at [phone] for increased pain or any other questions and concerns.    - VINCENT to follow up with Primary Care provider regarding elevated blood pressure.      Taylor Cohn, ELVA  [facility]  [address] [facility]  [address] [place] [address]  Tel [phone]  Fax [phone]      "

## 2019-12-03 NOTE — NURSING NOTE
"Shelia Ortiz is a 64 year old male who presents for:  Chief Complaint   Patient presents with     Neurologic Problem     S/P L1-2 laminectomy and resection of intradermal mass. DOS 10/4/19. Patient notes his back is doing great. He denies any pain. It feels great. He limits any heavy lifting. No pain in the leg.         Vitals:    Vitals:    12/03/19 1402   BP: (!) 153/93   Pulse: 87   SpO2: 100%   Weight: 168 lb 6.4 oz (76.4 kg)   Height: 5' 10\" (1.778 m)       BMI:  Estimated body mass index is 24.16 kg/m  as calculated from the following:    Height as of this encounter: 5' 10\" (1.778 m).    Weight as of this encounter: 168 lb 6.4 oz (76.4 kg).    Pain Score:  No Pain (0)        Radha Peñaloza CMA      "

## 2019-12-06 ENCOUNTER — ANCILLARY PROCEDURE (OUTPATIENT)
Dept: MRI IMAGING | Facility: CLINIC | Age: 64
End: 2019-12-06
Attending: NEUROLOGICAL SURGERY
Payer: COMMERCIAL

## 2019-12-06 DIAGNOSIS — Q85.03 SCHWANNOMATOSIS (H): ICD-10-CM

## 2019-12-06 PROCEDURE — 72158 MRI LUMBAR SPINE W/O & W/DYE: CPT | Performed by: RADIOLOGY

## 2019-12-06 PROCEDURE — A9585 GADOBUTROL INJECTION: HCPCS | Mod: JW | Performed by: NEUROLOGICAL SURGERY

## 2019-12-06 RX ORDER — GADOBUTROL 604.72 MG/ML
10 INJECTION INTRAVENOUS ONCE
Status: COMPLETED | OUTPATIENT
Start: 2019-12-06 | End: 2019-12-06

## 2019-12-06 RX ADMIN — GADOBUTROL 8 ML: 604.72 INJECTION INTRAVENOUS at 12:27

## 2019-12-09 ENCOUNTER — TELEPHONE (OUTPATIENT)
Dept: NEUROSURGERY | Facility: CLINIC | Age: 64
End: 2019-12-09

## 2019-12-09 NOTE — TELEPHONE ENCOUNTER
"After reviewing Lumbar MRI per Dr. Mittal, \"Looks good, other tumor is stable, main lesion is resected\". Calling to relay to patient. Thankful for call. Patient stated he will discuss with Gaye RUSSELL NP at his next follow up if a repeat MRI at some point in the future is needed.  "

## 2019-12-13 ENCOUNTER — PRE VISIT (OUTPATIENT)
Dept: ONCOLOGY | Facility: CLINIC | Age: 64
End: 2019-12-13

## 2020-01-16 ENCOUNTER — OFFICE VISIT (OUTPATIENT)
Dept: NEUROSURGERY | Facility: CLINIC | Age: 65
End: 2020-01-16
Payer: COMMERCIAL

## 2020-01-16 ENCOUNTER — TELEPHONE (OUTPATIENT)
Dept: NEUROSURGERY | Facility: CLINIC | Age: 65
End: 2020-01-16

## 2020-01-16 VITALS
WEIGHT: 172.2 LBS | SYSTOLIC BLOOD PRESSURE: 120 MMHG | HEIGHT: 70 IN | OXYGEN SATURATION: 98 % | TEMPERATURE: 96.8 F | DIASTOLIC BLOOD PRESSURE: 81 MMHG | BODY MASS INDEX: 24.65 KG/M2 | HEART RATE: 62 BPM

## 2020-01-16 DIAGNOSIS — Q85.03 SCHWANNOMATOSIS (H): ICD-10-CM

## 2020-01-16 DIAGNOSIS — Z98.890 S/P LAMINECTOMY: Primary | ICD-10-CM

## 2020-01-16 DIAGNOSIS — Q85.03 SCHWANNOMATOSIS (H): Primary | ICD-10-CM

## 2020-01-16 PROCEDURE — 99024 POSTOP FOLLOW-UP VISIT: CPT | Performed by: PHYSICIAN ASSISTANT

## 2020-01-16 ASSESSMENT — PAIN SCALES - GENERAL: PAINLEVEL: NO PAIN (0)

## 2020-01-16 ASSESSMENT — MIFFLIN-ST. JEOR: SCORE: 1587.34

## 2020-01-16 NOTE — TELEPHONE ENCOUNTER
Called patient informed him that follow up MRI's recommended late August, early September 2020. Orders placed, [employer] message sent per patient's request to serve as a reminder.

## 2020-01-16 NOTE — PROGRESS NOTES
"[facility] Neurosurgery  Neurosurgery followup:    HPI: 3 months s/p L1-2 laminectomy for resection of intradural mass. Recovering well with no back or leg pain. Post op MRI with resection of main lesion and stable other tumor. He is eager to increase activity. No weakness.  Exam:  Constitutional:  Alert, well nourished, NAD.  HEENT: Normocephalic, atraumatic.   Pulm:  Without shortness of breath   CV:  No pitting edema of BLE.      /81   Pulse 62   Temp 96.8  F (36  C) (Oral)   Ht 5' 10\" (1.778 m)   Wt 172 lb 3.2 oz (78.1 kg)   SpO2 98%   BMI 24.71 kg/m      Neurological:  Awake  Alert  Oriented x 3  Motor exam:        IP Q DF PF EHL  R   5  5   5   5    5  L   5  5   5   5    5     Reflexes are 2+ in the patellar and Achilles. There is no clonus. Downgoing Babinski.    Able to spontaneously move L/E bilaterally  Sensation intact throughout all L/E dermatomes     Incision: Nicely healed  A/P: 3 months s/p L1-2 laminectomy for resection of intradural mass. Recovering well with no back or leg pain. Post op MRI with resection of main lesion and stable other tumor. Full strength on exam. Incision nicely healed. Discussed with Dr. Mittal and Will have him repeat MRI w and w/o to monitor lesion. Patient voiced understanding and agreement.        Stefanie Stewart PA-C  [facility]  [address] [place] [address]  [address] [place] [address]    Tel [phone]  Pager [phone]    "

## 2020-01-16 NOTE — NURSING NOTE
"Shelia Ortiz is a 64 year old male who presents for:  Chief Complaint   Patient presents with     Neurologic Problem     S/p L1-2 laminectomy and resection of intradural mass. DOS 10/4/19. Patient notes his back is doing good. Denies any pain. Has a concern about the MRI results. He would like to discuss those. A few weeks ago he had a little pain in the left leg but then went away after a few days. When he sits or lays on bed he has a little pain.         Vitals:    Vitals:    01/16/20 1357   BP: 120/81   Pulse: 62   Temp: 96.8  F (36  C)   TempSrc: Oral   SpO2: 98%   Weight: 172 lb 3.2 oz (78.1 kg)   Height: 5' 10\" (1.778 m)       BMI:  Estimated body mass index is 24.71 kg/m  as calculated from the following:    Height as of this encounter: 5' 10\" (1.778 m).    Weight as of this encounter: 172 lb 3.2 oz (78.1 kg).    Pain Score:  No Pain (0)        Radha Peñaloza CMA      "

## 2020-01-16 NOTE — TELEPHONE ENCOUNTER
FROILANM requesting a return call to discuss repeat imaging.    Per Dr. Mittal and Stefanie OLVERA repeat MRIs of all the lesions and post-op bed at a year from the pre-op scans     Order was placed by Stefanie OLEVRA, will await return call from patient to provide number for scheduling. This imaging should be done approximately 9/2020

## 2020-01-16 NOTE — LETTER
"    1/16/2020         RE: Shelia Ortiz  [address]  [place] [address]        Dear Colleague,    Thank you for referring your patient, Shelia Ortiz, to the [facility]. Please see a copy of my visit note below.    [facility] Neurosurgery  Neurosurgery followup:    HPI: 3 months s/p L1-2 laminectomy for resection of intradural mass. Recovering well with no back or leg pain. Post op MRI with resection of main lesion and stable other tumor. He is eager to increase activity. No weakness.  Exam:  Constitutional:  Alert, well nourished, NAD.  HEENT: Normocephalic, atraumatic.   Pulm:  Without shortness of breath   CV:  No pitting edema of BLE.      /81   Pulse 62   Temp 96.8  F (36  C) (Oral)   Ht 5' 10\" (1.778 m)   Wt 172 lb 3.2 oz (78.1 kg)   SpO2 98%   BMI 24.71 kg/m       Neurological:  Awake  Alert  Oriented x 3  Motor exam:        IP Q DF PF EHL  R   5  5   5   5    5  L   5  5   5   5    5     Reflexes are 2+ in the patellar and Achilles. There is no clonus. Downgoing Babinski.    Able to spontaneously move L/E bilaterally  Sensation intact throughout all L/E dermatomes     Incision: Nicely healed  A/P: 3 months s/p L1-2 laminectomy for resection of intradural mass. Recovering well with no back or leg pain. Post op MRI with resection of main lesion and stable other tumor. Full strength on exam. Incision nicely healed. Discussed with Dr. Mittal and Will have him repeat MRI w and w/o to monitor lesion. Patient voiced understanding and agreement.        Stefanie Stewart PA-C  [facility]surgery  [address] [place] [address]  [address] [place] [address]    Tel [phone]  Pager [phone]      Again, thank you for allowing me to participate in the care of your patient.        Sincerely,        Stefanie Stewart PA-C    "

## 2020-01-16 NOTE — TELEPHONE ENCOUNTER
Reason For Call: Patient would like to know if an MRI should be done right away, or in 1 year as dicussed at his visit today with Stefanie, he would like a call back at [phone]

## 2020-01-27 ENCOUNTER — MYC MEDICAL ADVICE (OUTPATIENT)
Dept: FAMILY MEDICINE | Facility: CLINIC | Age: 65
End: 2020-01-27

## 2020-02-23 ENCOUNTER — HEALTH MAINTENANCE LETTER (OUTPATIENT)
Age: 65
End: 2020-02-23

## 2020-03-26 NOTE — OR NURSING
Cynthia advised that her x-ray was completed and sent to our office. They wanted to know if the xrays have been received.   The patient is complaining of pain and they want to know what was found on the xrays. She says the xray was done on Tuesday (3/24/20).  We have no faxes for her.                                ----- Message from Jeimy Flor sent at 3/26/2020  1:30 PM CDT -----  Contact: Patient's daughter- Cynthia Ford  Please call concerning patient's x-ray results and the pain she is experiencing. Please call to advise at Ph .[phone]       Spoke with Dr. Booker. Sign out given. Ok to transfer to the floor.

## 2020-05-06 DIAGNOSIS — I10 ESSENTIAL HYPERTENSION: ICD-10-CM

## 2020-05-07 RX ORDER — METOPROLOL SUCCINATE 25 MG/1
TABLET, EXTENDED RELEASE ORAL
Qty: 90 TABLET | Refills: 0 | Status: SHIPPED | OUTPATIENT
Start: 2020-05-07 | End: 2020-08-03

## 2020-05-07 NOTE — TELEPHONE ENCOUNTER
Prescription approved per [facility] Refill Protocol.    Lily Galeas RN  [facility]

## 2020-07-29 DIAGNOSIS — I10 ESSENTIAL HYPERTENSION: ICD-10-CM

## 2020-08-03 RX ORDER — METOPROLOL SUCCINATE 25 MG/1
TABLET, EXTENDED RELEASE ORAL
Qty: 90 TABLET | Refills: 0 | Status: SHIPPED | OUTPATIENT
Start: 2020-08-03 | End: 2021-06-28 | Stop reason: DRUGHIGH

## 2020-08-03 NOTE — TELEPHONE ENCOUNTER
Prescription approved per [facility] Refill Protocol.  Sendy Edge RN  [facility]

## 2020-08-10 ENCOUNTER — VIRTUAL VISIT (OUTPATIENT)
Dept: FAMILY MEDICINE | Facility: CLINIC | Age: 65
End: 2020-08-10
Payer: COMMERCIAL

## 2020-08-10 DIAGNOSIS — E78.5 HYPERLIPIDEMIA LDL GOAL <130: ICD-10-CM

## 2020-08-10 DIAGNOSIS — I10 ESSENTIAL HYPERTENSION: Primary | ICD-10-CM

## 2020-08-10 PROCEDURE — 99213 OFFICE O/P EST LOW 20 MIN: CPT | Mod: GT | Performed by: FAMILY MEDICINE

## 2020-08-10 RX ORDER — METOPROLOL SUCCINATE 50 MG/1
50 TABLET, EXTENDED RELEASE ORAL DAILY
Qty: 90 TABLET | Refills: 1 | Status: SHIPPED | OUTPATIENT
Start: 2020-08-10 | End: 2021-02-03

## 2020-08-10 ASSESSMENT — PAIN SCALES - GENERAL: PAINLEVEL: NO PAIN (0)

## 2020-08-10 NOTE — PATIENT INSTRUCTIONS
Increase metoprolol to 50 mg dosing.  You can take 2 of the 25's until they are gone.  You can continue to monitor the blood pressure at the 25 mg dose for a few weeks to see if you are continuing to see elevated diastolic blood pressures prior to the increase to 50 mg.    Fasting labs recommended.  Please schedule lab appointment.      At [facility], we strive to deliver an exceptional experience to you, every time we see you. If you receive a survey, please complete it as we do value your feedback.  If you have MyChart, you can expect to receive results automatically within 24 hours of their completion.  Your provider will send a note interpreting your results as well.   If you do not have MyChart, you should receive your results in about a week by mail.    Your care team:     Family Medicine   WADE Hernandez APRN CNP S. Matthew Hockett, MD Pamela Kolacz, MD Angela Wermerskirchen, MD    Internal Medicine  Florencio Sahni MD     Clinic hours: Monday - Wednesday 7 am-7 pm   Thursdays and Fridays 7 am-5 pm.     [facility]: Monday - Friday 11 am-9 pm,   Saturday and Sunday 9 am-5 pm.     [facility]: Monday - Thursday 8 am - 7 pm; Friday 8 am - 6 pm    Clinic: [phone]   [facility]: [phone]     Use [email or web address] for 24/7 diagnosis and treatment of dozens of conditions.

## 2020-08-10 NOTE — PROGRESS NOTES
"Shelia Ortiz is a 65 year old male who is being evaluated via a billable video visit.      The patient has been notified of following:     \"This video visit will be conducted via a call between you and your physician/provider. We have found that certain health care needs can be provided without the need for an in-person physical exam.  This service lets us provide the care you need with a video conversation.  If a prescription is necessary we can send it directly to your pharmacy.  If lab work is needed we can place an order for that and you can then stop by our lab to have the test done at a later time.    Video visits are billed at different rates depending on your insurance coverage.  Please reach out to your insurance provider with any questions.    If during the course of the call the physician/provider feels a video visit is not appropriate, you will not be charged for this service.\"    Patient has given verbal consent for Video visit? Yes  How would you like to obtain your AVS? MyChart  If you are dropped from the video visit, the video invite should be resent to: Send to e-mail at: [email or web address]  Will anyone else be joining your video visit? No    Subjective     Shelia Ortiz is a 65 year old male who presents today via video visit for the following health issues:    HPI    Hypertension Follow-up      Do you check your blood pressure regularly outside of the clinic? Yes     Are you following a low salt diet? Yes    Are your blood pressures ever more than 140 on the top number (systolic) OR more   than 90 on the bottom number (diastolic), for example 140/90? No      How many servings of fruits and vegetables do you eat daily?  2-3    On average, how many sweetened beverages do you drink each day (Examples: soda, juice, sweet tea, etc.  Do NOT count diet or artificially sweetened beverages)?   2    How many days per week do you exercise enough to make your heart beat faster? 7    How many minutes a day do " you exercise enough to make your heart beat faster? 30 - 60    How many days per week do you miss taking your medication? 0         Video Start Time: 8:38 am        BP Readings from Last 3 Encounters:   01/16/20 120/81   12/03/19 (!) 153/93   12/02/19 (!) 151/103    Wt Readings from Last 3 Encounters:   01/16/20 78.1 kg (172 lb 3.2 oz)   12/03/19 76.4 kg (168 lb 6.4 oz)   12/02/19 75.3 kg (166 lb)                    Reviewed and updated as needed this visit by Provider  Tobacco  Allergies  Meds  Med Hx  Surg Hx  Fam Hx  Soc Hx        Review of Systems   Constitutional, HEENT, cardiovascular, pulmonary, gi and gu systems are negative, except as otherwise noted.      Objective             Physical Exam     GENERAL: Healthy, alert and no distress  EYES: Eyes grossly normal to inspection.  No discharge or erythema, or obvious scleral/conjunctival abnormalities.  RESP: No audible wheeze, cough, or visible cyanosis.  No visible retractions or increased work of breathing.    SKIN: Visible skin clear. No significant rash, abnormal pigmentation or lesions.  NEURO: Cranial nerves grossly intact.  Mentation and speech appropriate for age.  PSYCH: Mentation appears normal, affect normal/bright, judgement and insight intact, normal speech and appearance well-groomed.      Diagnostic Test Results:  Labs reviewed in Epic        Assessment & Plan     1. Essential hypertension  Blood pressure is borderline elevated at times at home.  He is tolerating medication well.  We will plan an increase to 50 mg dosing.  He can use 2 of the 25 that he has at home until they are gone.  Return to clinic for fasting labs recommended  - metoprolol succinate ER (TOPROL-XL) 50 MG 24 hr tablet; Take 1 tablet (50 mg) by mouth daily  Dispense: 90 tablet; Refill: 1  - Albumin Random Urine Quantitative with Creat Ratio; Future  - **Comprehensive metabolic panel FUTURE anytime; Future  - **CBC with platelets FUTURE anytime; Future    2.  Hyperlipidemia LDL goal <130  Return to clinic for fasting labs recommended  - **Comprehensive metabolic panel FUTURE anytime; Future  - Lipid panel reflex to direct LDL Fasting; Future     Patient Instructions     Increase metoprolol to 50 mg dosing.  You can take 2 of the 25's until they are gone.  You can continue to monitor the blood pressure at the 25 mg dose for a few weeks to see if you are continuing to see elevated diastolic blood pressures prior to the increase to 50 mg.    Fasting labs recommended.  Please schedule lab appointment.            Return in about 1 week (around 8/17/2020) for Lab Work, 6 months follow up appointment..    Jannet Mijares MD  [facility]      Video-Visit Details    Type of service:  Video Visit    Video End Time:8:59 AM    Originating Location (pt. Location): Home    Distant Location (provider location):  [facility]     Platform used for Video Visit: Urgent Group           Jannet Mijares MD

## 2020-08-14 DIAGNOSIS — I10 ESSENTIAL HYPERTENSION: ICD-10-CM

## 2020-08-14 DIAGNOSIS — E78.5 HYPERLIPIDEMIA LDL GOAL <130: ICD-10-CM

## 2020-08-14 LAB
ALBUMIN SERPL-MCNC: 4.2 G/DL (ref 3.4–5)
ALP SERPL-CCNC: 96 U/L (ref 40–150)
ALT SERPL W P-5'-P-CCNC: 43 U/L (ref 0–70)
ANION GAP SERPL CALCULATED.3IONS-SCNC: 2 MMOL/L (ref 3–14)
AST SERPL W P-5'-P-CCNC: 21 U/L (ref 0–45)
BILIRUB SERPL-MCNC: 0.8 MG/DL (ref 0.2–1.3)
BUN SERPL-MCNC: 19 MG/DL (ref 7–30)
CALCIUM SERPL-MCNC: 8.7 MG/DL (ref 8.5–10.1)
CHLORIDE SERPL-SCNC: 106 MMOL/L (ref 94–109)
CHOLEST SERPL-MCNC: 196 MG/DL
CO2 SERPL-SCNC: 31 MMOL/L (ref 20–32)
CREAT SERPL-MCNC: 1.14 MG/DL (ref 0.66–1.25)
CREAT UR-MCNC: 97 MG/DL
ERYTHROCYTE [DISTWIDTH] IN BLOOD BY AUTOMATED COUNT: 12.4 % (ref 10–15)
GFR SERPL CREATININE-BSD FRML MDRD: 68 ML/MIN/{1.73_M2}
GLUCOSE SERPL-MCNC: 105 MG/DL (ref 70–99)
HCT VFR BLD AUTO: 45.4 % (ref 40–53)
HDLC SERPL-MCNC: 45 MG/DL
HGB BLD-MCNC: 15.3 G/DL (ref 13.3–17.7)
LDLC SERPL CALC-MCNC: 103 MG/DL
MCH RBC QN AUTO: 30.1 PG (ref 26.5–33)
MCHC RBC AUTO-ENTMCNC: 33.7 G/DL (ref 31.5–36.5)
MCV RBC AUTO: 89 FL (ref 78–100)
MICROALBUMIN UR-MCNC: 12 MG/L
MICROALBUMIN/CREAT UR: 11.86 MG/G CR (ref 0–17)
NONHDLC SERPL-MCNC: 151 MG/DL
PLATELET # BLD AUTO: 197 10E9/L (ref 150–450)
POTASSIUM SERPL-SCNC: 3.9 MMOL/L (ref 3.4–5.3)
PROT SERPL-MCNC: 7.6 G/DL (ref 6.8–8.8)
RBC # BLD AUTO: 5.09 10E12/L (ref 4.4–5.9)
SODIUM SERPL-SCNC: 139 MMOL/L (ref 133–144)
TRIGL SERPL-MCNC: 241 MG/DL
WBC # BLD AUTO: 4.3 10E9/L (ref 4–11)

## 2020-08-14 PROCEDURE — 80061 LIPID PANEL: CPT | Performed by: FAMILY MEDICINE

## 2020-08-14 PROCEDURE — 85027 COMPLETE CBC AUTOMATED: CPT | Performed by: FAMILY MEDICINE

## 2020-08-14 PROCEDURE — 80053 COMPREHEN METABOLIC PANEL: CPT | Performed by: FAMILY MEDICINE

## 2020-08-14 PROCEDURE — 82043 UR ALBUMIN QUANTITATIVE: CPT | Performed by: FAMILY MEDICINE

## 2020-08-14 PROCEDURE — 36415 COLL VENOUS BLD VENIPUNCTURE: CPT | Performed by: FAMILY MEDICINE

## 2020-09-25 ENCOUNTER — ANCILLARY PROCEDURE (OUTPATIENT)
Dept: MRI IMAGING | Facility: CLINIC | Age: 65
End: 2020-09-25
Attending: PHYSICIAN ASSISTANT
Payer: COMMERCIAL

## 2020-09-25 DIAGNOSIS — Z98.890 S/P LAMINECTOMY: ICD-10-CM

## 2020-09-25 DIAGNOSIS — Q85.03 SCHWANNOMATOSIS (H): ICD-10-CM

## 2020-09-25 PROCEDURE — A9585 GADOBUTROL INJECTION: HCPCS | Performed by: PHYSICIAN ASSISTANT

## 2020-09-25 PROCEDURE — 72158 MRI LUMBAR SPINE W/O & W/DYE: CPT | Performed by: STUDENT IN AN ORGANIZED HEALTH CARE EDUCATION/TRAINING PROGRAM

## 2020-09-25 PROCEDURE — 72157 MRI CHEST SPINE W/O & W/DYE: CPT | Performed by: STUDENT IN AN ORGANIZED HEALTH CARE EDUCATION/TRAINING PROGRAM

## 2020-09-25 RX ORDER — GADOBUTROL 604.72 MG/ML
10 INJECTION INTRAVENOUS ONCE
Status: COMPLETED | OUTPATIENT
Start: 2020-09-25 | End: 2020-09-25

## 2020-09-25 RX ADMIN — GADOBUTROL 8 ML: 604.72 INJECTION INTRAVENOUS at 15:51

## 2020-10-07 ENCOUNTER — TELEPHONE (OUTPATIENT)
Dept: NEUROSURGERY | Facility: CLINIC | Age: 65
End: 2020-10-07

## 2020-10-07 NOTE — TELEPHONE ENCOUNTER
Patient LVM that he had a Lumbar MRI on 09/25/2020 and inquiring about results.  Called and stated will forward to team to review.  He agreed with plan.

## 2020-10-09 NOTE — TELEPHONE ENCOUNTER
"MRI lumbar and thoracic spine reviewed with Dr. Mittal. MRIs are stable. Recommend to repeat in 1 year, then can switch to every 2 years. We typically follow for 5-10 years.     Spoke to patient and discussed MRI results. He states he \"feels great\", denies any back or leg pain, denies weakness. He's happy to report he is able to exercise daily.     Our team will contact patient to schedule MRI lumbar and thoracic spine for Sept/Oct 2021. Advised patient to follow-up sooner if he develops any increased pain, weakness, or neurological changes. He voiced agreement with this plan.     Pamella Martinez [facility]  [address] [address]  Tel [phone]  Pager [phone]    "

## 2020-10-25 DIAGNOSIS — I10 ESSENTIAL HYPERTENSION: ICD-10-CM

## 2020-10-27 RX ORDER — METOPROLOL SUCCINATE 25 MG/1
TABLET, EXTENDED RELEASE ORAL
Qty: 90 TABLET | Refills: 0 | OUTPATIENT
Start: 2020-10-27

## 2020-10-27 NOTE — TELEPHONE ENCOUNTER
Metoprolol Succinate 25 mg  Dose was increased to 50 mg daily in August 2020, by PCP. New script was sent at time of change for 6 month supply of 50 mg tabs.  Current request for 25 mg tabs denied to pharmacy, due to adjustment in therapy.    Lily Galeas RN  [facility]

## 2020-12-06 ENCOUNTER — HEALTH MAINTENANCE LETTER (OUTPATIENT)
Age: 65
End: 2020-12-06

## 2021-02-02 DIAGNOSIS — I10 ESSENTIAL HYPERTENSION: ICD-10-CM

## 2021-02-02 NOTE — TELEPHONE ENCOUNTER
"Routing refill request to provider for review/approval because:  Requested Prescriptions   Pending Prescriptions Disp Refills    metoprolol succinate ER (TOPROL-XL) 50 MG 24 hr tablet [Pharmacy Med Name: METOPROLOL SUCC ER 50 MG TAB] 90 tablet 1     Sig: TAKE 1 TABLET BY MOUTH EVERY DAY       Beta-Blockers Protocol Failed - 2/2/2021 12:28 AM        Failed - Blood pressure under 140/90 in past 12 months     BP Readings from Last 3 Encounters:   01/16/20 120/81   12/03/19 (!) 153/93   12/02/19 (!) 151/103                 Failed - Recent (12 mo) or future (30 days) visit within the authorizing provider's specialty     Patient has had an office visit with the authorizing provider or a provider within the authorizing providers department within the previous 12 mos or has a future within next 30 days. See \"Patient Info\" tab in inbasket, or \"Choose Columns\" in Meds & Orders section of the refill encounter.              Passed - Patient is age 6 or older        Passed - Medication is active on med list               Gracie PINTON, RN, CPN          "

## 2021-02-03 RX ORDER — METOPROLOL SUCCINATE 50 MG/1
50 TABLET, EXTENDED RELEASE ORAL DAILY
Qty: 30 TABLET | Refills: 0 | Status: SHIPPED | OUTPATIENT
Start: 2021-02-03 | End: 2021-05-26

## 2021-04-11 ENCOUNTER — HEALTH MAINTENANCE LETTER (OUTPATIENT)
Age: 66
End: 2021-04-11

## 2021-05-24 DIAGNOSIS — I10 ESSENTIAL HYPERTENSION: ICD-10-CM

## 2021-05-24 NOTE — TELEPHONE ENCOUNTER
Routing refill request to provider for review/approval because:  Karissa given x1 and patient did not follow up, please advise  Emy Boyle BSN, RN

## 2021-05-26 RX ORDER — METOPROLOL SUCCINATE 50 MG/1
50 TABLET, EXTENDED RELEASE ORAL DAILY
Qty: 30 TABLET | Refills: 0 | Status: SHIPPED | OUTPATIENT
Start: 2021-05-26 | End: 2021-06-17

## 2021-05-26 NOTE — TELEPHONE ENCOUNTER
Patient needs to be seen by Jannet Mijares MD  (provider or resource)  Is there a time frame in which the patient should be seen Yes: within month  What does the patient need to be seen regarding HTN,  physical  Does patient need to come fasting No  Phone: [phone] (home)  When workflow completed Route to provider if refill needed to get to follow up appointment.    Clinic: [facility] at [phone]    'Hi, this is (your name) I am calling from (provider's name) office. After reviewing your chart, (Provider's name) has indicated that you need an appointment to review (reason for visit). May I assist you in making this appointment? (Please contact us via [employer] or by phone at (Clinic name and Phone number) to schedule this appointment at your earliest convenience)'    Was first outreach attempted?No  If yes, the Second attempt due on:

## 2021-05-26 NOTE — TELEPHONE ENCOUNTER
6/28/21 appt scheduled.   Pt will be out of medication by appt date.  Routing to provider for omar refill

## 2021-06-17 DIAGNOSIS — I10 ESSENTIAL HYPERTENSION: ICD-10-CM

## 2021-06-17 RX ORDER — METOPROLOL SUCCINATE 50 MG/1
50 TABLET, EXTENDED RELEASE ORAL DAILY
Qty: 30 TABLET | Refills: 0 | Status: SHIPPED | OUTPATIENT
Start: 2021-06-17 | End: 2021-06-28 | Stop reason: DRUGHIGH

## 2021-06-17 NOTE — TELEPHONE ENCOUNTER
Routing refill request to provider for review/approval because:  Karissa given x1 and is scheduled to be seen on 06/28/2021, please advise.     Carli Marcus RN  [facility]

## 2021-06-25 NOTE — PROGRESS NOTES
Assessment & Plan     Essential hypertension  Trial of Metoprolol 25 mg dosage daily.  Blood pressure is controlled but borderline elevated microalbumin is now noted.  Recheck of this planned in 6 months with his annual exam.  If this level remains elevated, addition or substitution of lisinopril would be recommended.  - Comprehensive metabolic panel  - CBC with platelets  - Albumin Random Urine Quantitative with Creat Ratio  - metoprolol succinate ER (TOPROL-XL) 25 MG 24 hr tablet; Take 1 tablet (25 mg) by mouth daily    Hyperlipidemia LDL goal <130  The 10-year ASCVD risk score (Monicakleber PAULA Jr., et al., 2013) is: 16.7%    Values used to calculate the score:      Age: 66 years      Sex: Male      Is Non- : No      Diabetic: No      Tobacco smoker: No      Systolic Blood Pressure: 136 mmHg      Is BP treated: Yes      HDL Cholesterol: 41 mg/dL      Total Cholesterol: 195 mg/dL   Message to patient with results recommending use of cholesterol-lowering medication.  Awaiting patient response.  - Lipid panel reflex to direct LDL Fasting  - Comprehensive metabolic panel    Screening for prostate cancer  Normal screening  - PSA, screen    Need for Td vaccine  Update immunization  - TD PRESERV FREE, IM (7+ YRS)    Need for shingles vaccine  Booster in 2 to 6 months  - ZOSTER VACCINE RECOMBINANT ADJUVANTED IM NJX             Patient Instructions   Decrease the Metoprolol to 25 mg daily.   If BP increases, return to the 50 mg dose and notify me for a new prescription.      Labs today.      Update tetanus and shingles vaccine today.          Return in about 6 months (around 12/28/2021) for Physical Exam, chronic health problems.    Jannet Mijares MD  [facility]    Dakota MONZON is a 66 year old who presents for the following health issues     History of Present Illness       He eats 2-3 servings of fruits and vegetables daily.He consumes 1 sweetened beverage(s)  daily.He exercises with enough effort to increase his heart rate 10 to 19 minutes per day.    He is taking medications regularly.       Hypertension Follow-up      Do you check your blood pressure regularly outside of the clinic? Yes     Are you following a low salt diet? Yes    Are your blood pressures ever more than 140 on the top number (systolic) OR more   than 90 on the bottom number (diastolic), for example 140/90? No   135/94  He reports that blood pressures have been the same whether he is on the 25 to 50 mg metoprolol.  He would like to return to the 25 mg dosing and monitor his blood pressure now.        How many servings of fruits and vegetables do you eat daily?  2-3    On average, how many sweetened beverages do you drink each day (Examples: soda, juice, sweet tea, etc.  Do NOT count diet or artificially sweetened beverages)?   0    How many days per week do you exercise enough to make your heart beat faster? 6    How many minutes a day do you exercise enough to make your heart beat faster? 30 - 60    How many days per week do you miss taking your medication? 0      Hyperlipidemia Follow-Up      Are you regularly taking any medication or supplement to lower your cholesterol?   No    Are you having muscle aches or other side effects that you think could be caused by your cholesterol lowering medication?  n/a        Review of Systems   Constitutional, HEENT, cardiovascular, pulmonary, gi and gu systems are negative, except as otherwise noted.    last week with cramping in legs.        Objective    /89   Pulse 62   Temp 97.8  F (36.6  C) (Oral)   Resp 16   Wt 81.6 kg (180 lb)   SpO2 98%   BMI 25.83 kg/m    Body mass index is 25.83 kg/m .  Physical Exam   GENERAL: alert, no distress and over weight  NECK: no adenopathy, no asymmetry, masses, or scars and thyroid normal to palpation  RESP: lungs clear to auscultation - no rales, rhonchi or wheezes  CV: regular rate and rhythm, normal S1 S2, no S3  or S4, no murmur, click or rub, no peripheral edema and peripheral pulses strong  ABDOMEN: soft, nontender, no hepatosplenomegaly, no masses and bowel sounds normal  MS: no gross musculoskeletal defects noted, no edema  BACK: no CVA tenderness, no paralumbar tenderness  PSYCH: mentation appears normal, affect normal/bright    Results for orders placed or performed in visit on 06/28/21   Lipid panel reflex to direct LDL Fasting     Status: Abnormal   Result Value Ref Range    Cholesterol 195 <200 mg/dL    Triglycerides 208 (H) <150 mg/dL    HDL Cholesterol 41 >39 mg/dL    LDL Cholesterol Calculated 112 (H) <100 mg/dL    Non HDL Cholesterol 154 (H) <130 mg/dL   Comprehensive metabolic panel     Status: Abnormal   Result Value Ref Range    Sodium 139 133 - 144 mmol/L    Potassium 4.3 3.4 - 5.3 mmol/L    Chloride 107 94 - 109 mmol/L    Carbon Dioxide 28 20 - 32 mmol/L    Anion Gap 4 3 - 14 mmol/L    Glucose 102 (H) 70 - 99 mg/dL    Urea Nitrogen 16 7 - 30 mg/dL    Creatinine 1.21 0.66 - 1.25 mg/dL    GFR Estimate 63 >60 mL/min/[1.73_m2]    GFR Estimate If Black 73 >60 mL/min/[1.73_m2]    Calcium 8.5 8.5 - 10.1 mg/dL    Bilirubin Total 0.5 0.2 - 1.3 mg/dL    Albumin 4.0 3.4 - 5.0 g/dL    Protein Total 7.7 6.8 - 8.8 g/dL    Alkaline Phosphatase 76 40 - 150 U/L    ALT 48 0 - 70 U/L    AST 28 0 - 45 U/L   CBC with platelets     Status: None   Result Value Ref Range    WBC 4.9 4.0 - 11.0 10e9/L    RBC Count 5.19 4.4 - 5.9 10e12/L    Hemoglobin 15.6 13.3 - 17.7 g/dL    Hematocrit 47.0 40.0 - 53.0 %    MCV 91 78 - 100 fl    MCH 30.1 26.5 - 33.0 pg    MCHC 33.2 31.5 - 36.5 g/dL    RDW 13.0 10.0 - 15.0 %    Platelet Count 176 150 - 450 10e9/L   Albumin Random Urine Quantitative with Creat Ratio     Status: Abnormal   Result Value Ref Range    Creatinine Urine 273 mg/dL    Albumin Urine mg/L 58 mg/L    Albumin Urine mg/g Cr 21.17 (H) 0 - 17 mg/g Cr   PSA, screen     Status: None   Result Value Ref Range    PSA 0.98 0 - 4 ug/L            This chart was documented by provider using a voice activated software called Dragon in addition to manual typing. There may be vocabulary errors or other grammatical errors due to this.

## 2021-06-28 ENCOUNTER — APPOINTMENT (OUTPATIENT)
Dept: LAB | Facility: CLINIC | Age: 66
End: 2021-06-28
Payer: COMMERCIAL

## 2021-06-28 ENCOUNTER — OFFICE VISIT (OUTPATIENT)
Dept: FAMILY MEDICINE | Facility: CLINIC | Age: 66
End: 2021-06-28
Payer: COMMERCIAL

## 2021-06-28 VITALS
RESPIRATION RATE: 16 BRPM | WEIGHT: 180 LBS | SYSTOLIC BLOOD PRESSURE: 136 MMHG | DIASTOLIC BLOOD PRESSURE: 89 MMHG | OXYGEN SATURATION: 98 % | BODY MASS INDEX: 25.83 KG/M2 | TEMPERATURE: 97.8 F | HEART RATE: 62 BPM

## 2021-06-28 DIAGNOSIS — Z23 NEED FOR SHINGLES VACCINE: ICD-10-CM

## 2021-06-28 DIAGNOSIS — E78.5 HYPERLIPIDEMIA LDL GOAL <130: ICD-10-CM

## 2021-06-28 DIAGNOSIS — Z23 NEED FOR TD VACCINE: ICD-10-CM

## 2021-06-28 DIAGNOSIS — I10 ESSENTIAL HYPERTENSION: Primary | ICD-10-CM

## 2021-06-28 DIAGNOSIS — Z12.5 SCREENING FOR PROSTATE CANCER: ICD-10-CM

## 2021-06-28 LAB
ALBUMIN SERPL-MCNC: 4 G/DL (ref 3.4–5)
ALP SERPL-CCNC: 76 U/L (ref 40–150)
ALT SERPL W P-5'-P-CCNC: 48 U/L (ref 0–70)
ANION GAP SERPL CALCULATED.3IONS-SCNC: 4 MMOL/L (ref 3–14)
AST SERPL W P-5'-P-CCNC: 28 U/L (ref 0–45)
BILIRUB SERPL-MCNC: 0.5 MG/DL (ref 0.2–1.3)
BUN SERPL-MCNC: 16 MG/DL (ref 7–30)
CALCIUM SERPL-MCNC: 8.5 MG/DL (ref 8.5–10.1)
CHLORIDE SERPL-SCNC: 107 MMOL/L (ref 94–109)
CHOLEST SERPL-MCNC: 195 MG/DL
CO2 SERPL-SCNC: 28 MMOL/L (ref 20–32)
CREAT SERPL-MCNC: 1.21 MG/DL (ref 0.66–1.25)
CREAT UR-MCNC: 273 MG/DL
ERYTHROCYTE [DISTWIDTH] IN BLOOD BY AUTOMATED COUNT: 13 % (ref 10–15)
GFR SERPL CREATININE-BSD FRML MDRD: 63 ML/MIN/{1.73_M2}
GLUCOSE SERPL-MCNC: 102 MG/DL (ref 70–99)
HCT VFR BLD AUTO: 47 % (ref 40–53)
HDLC SERPL-MCNC: 41 MG/DL
HGB BLD-MCNC: 15.6 G/DL (ref 13.3–17.7)
LDLC SERPL CALC-MCNC: 112 MG/DL
MCH RBC QN AUTO: 30.1 PG (ref 26.5–33)
MCHC RBC AUTO-ENTMCNC: 33.2 G/DL (ref 31.5–36.5)
MCV RBC AUTO: 91 FL (ref 78–100)
MICROALBUMIN UR-MCNC: 58 MG/L
MICROALBUMIN/CREAT UR: 21.17 MG/G CR (ref 0–17)
NONHDLC SERPL-MCNC: 154 MG/DL
PLATELET # BLD AUTO: 176 10E9/L (ref 150–450)
POTASSIUM SERPL-SCNC: 4.3 MMOL/L (ref 3.4–5.3)
PROT SERPL-MCNC: 7.7 G/DL (ref 6.8–8.8)
PSA SERPL-ACNC: 0.98 UG/L (ref 0–4)
RBC # BLD AUTO: 5.19 10E12/L (ref 4.4–5.9)
SODIUM SERPL-SCNC: 139 MMOL/L (ref 133–144)
TRIGL SERPL-MCNC: 208 MG/DL
WBC # BLD AUTO: 4.9 10E9/L (ref 4–11)

## 2021-06-28 PROCEDURE — 80053 COMPREHEN METABOLIC PANEL: CPT | Performed by: FAMILY MEDICINE

## 2021-06-28 PROCEDURE — 90471 IMMUNIZATION ADMIN: CPT | Performed by: FAMILY MEDICINE

## 2021-06-28 PROCEDURE — G0103 PSA SCREENING: HCPCS | Performed by: FAMILY MEDICINE

## 2021-06-28 PROCEDURE — 90714 TD VACC NO PRESV 7 YRS+ IM: CPT | Performed by: FAMILY MEDICINE

## 2021-06-28 PROCEDURE — 90750 HZV VACC RECOMBINANT IM: CPT | Performed by: FAMILY MEDICINE

## 2021-06-28 PROCEDURE — 80061 LIPID PANEL: CPT | Performed by: FAMILY MEDICINE

## 2021-06-28 PROCEDURE — 90472 IMMUNIZATION ADMIN EACH ADD: CPT | Performed by: FAMILY MEDICINE

## 2021-06-28 PROCEDURE — 82043 UR ALBUMIN QUANTITATIVE: CPT | Performed by: FAMILY MEDICINE

## 2021-06-28 PROCEDURE — 85027 COMPLETE CBC AUTOMATED: CPT | Performed by: FAMILY MEDICINE

## 2021-06-28 PROCEDURE — 36415 COLL VENOUS BLD VENIPUNCTURE: CPT | Performed by: FAMILY MEDICINE

## 2021-06-28 PROCEDURE — 99214 OFFICE O/P EST MOD 30 MIN: CPT | Mod: 25 | Performed by: FAMILY MEDICINE

## 2021-06-28 RX ORDER — METOPROLOL SUCCINATE 25 MG/1
25 TABLET, EXTENDED RELEASE ORAL DAILY
Qty: 90 TABLET | Refills: 1 | Status: SHIPPED | OUTPATIENT
Start: 2021-06-28 | End: 2021-12-13 | Stop reason: ALTCHOICE

## 2021-06-28 NOTE — PATIENT INSTRUCTIONS
Decrease the Metoprolol to 25 mg daily.   If BP increases, return to the 50 mg dose and notify me for a new prescription.      Labs today.      Update tetanus and shingles vaccine today.

## 2021-06-28 NOTE — NURSING NOTE
Prior to immunization administration, verified patients identity using patient s name and date of birth. Please see Immunization Activity for additional information.     Screening Questionnaire for Adult Immunization    Are you sick today?   No   Do you have allergies to medications, food, a vaccine component or latex?   No   Have you ever had a serious reaction after receiving a vaccination?   No   Do you have a long-term health problem with heart, lung, kidney, or metabolic disease (e.g., diabetes), asthma, a blood disorder, no spleen, complement component deficiency, a cochlear implant, or a spinal fluid leak?  Are you on long-term aspirin therapy?   No   Do you have cancer, leukemia, HIV/AIDS, or any other immune system problem?   No   Do you have a parent, brother, or sister with an immune system problem?   No   In the past 3 months, have you taken medications that affect  your immune system, such as prednisone, other steroids, or anticancer drugs; drugs for the treatment of rheumatoid arthritis, Crohn s disease, or psoriasis; or have you had radiation treatments?   No   Have you had a seizure, or a brain or other nervous system problem?   No   During the past year, have you received a transfusion of blood or blood    products, or been given immune (gamma) globulin or antiviral drug?   No   For women: Are you pregnant or is there a chance you could become       pregnant during the next month?   No   Have you received any vaccinations in the past 4 weeks?   No     Immunization questionnaire answers were all negative.        Per orders of Dr. Mijares, injection of TD and shingrix given by Marta Thomas. Patient instructed to remain in clinic for 15 minutes afterwards, and to report any adverse reaction to me immediately.       Screening performed by Marta Thomas on 6/28/2021 at 8:35 AM.

## 2021-09-07 DIAGNOSIS — Q85.03 SCHWANNOMATOSIS (H): Primary | ICD-10-CM

## 2021-09-07 NOTE — CONFIDENTIAL NOTE
Ok per Dr. Mittal to have patient delay obtaining T and L spine MRIs until after Jan. 1, 2022.     Called and notified patient. Writer did provide number for central scheduling but also told him a reminder will be sent to the RNs to call patient in January to make sure he schedules the MRIs. Advised patient should he develop any symptoms or have any questions in the interim to call us. Understanding was verbalized.

## 2021-09-07 NOTE — CONFIDENTIAL NOTE
"Per Dr. Mittal- Repeat MRI thoracic and lumbar spine for Sept/Oct 2021. Orders placed.     Spoke to patient. He was wondering if he could possibly delay getting the MRIs. He said he has a very high deductible right now and after Jan. 1 2022 it will be much more affordable for him. Patient said he is \" feeling good\" and denies any symptoms (no back or leg pain, and no weakness reported). Message routed to Dr. Mittal to make sure he is ok if patient delays the imaging.     Writer will follow-up with patient after response received.   "

## 2021-09-25 ENCOUNTER — HEALTH MAINTENANCE LETTER (OUTPATIENT)
Age: 66
End: 2021-09-25

## 2021-10-26 ENCOUNTER — MYC MEDICAL ADVICE (OUTPATIENT)
Dept: FAMILY MEDICINE | Facility: CLINIC | Age: 66
End: 2021-10-26

## 2021-10-26 DIAGNOSIS — I10 ESSENTIAL HYPERTENSION: ICD-10-CM

## 2021-10-26 DIAGNOSIS — E78.5 HYPERLIPIDEMIA LDL GOAL <130: Primary | ICD-10-CM

## 2021-10-27 RX ORDER — METOPROLOL SUCCINATE 50 MG/1
50 TABLET, EXTENDED RELEASE ORAL DAILY
Qty: 90 TABLET | Refills: 0 | Status: SHIPPED | OUTPATIENT
Start: 2021-10-27 | End: 2022-01-19

## 2021-12-13 DIAGNOSIS — I10 ESSENTIAL HYPERTENSION: ICD-10-CM

## 2021-12-13 RX ORDER — METOPROLOL SUCCINATE 25 MG/1
25 TABLET, EXTENDED RELEASE ORAL DAILY
Qty: 90 TABLET | Refills: 1 | OUTPATIENT
Start: 2021-12-13

## 2022-01-20 ENCOUNTER — OFFICE VISIT (OUTPATIENT)
Dept: FAMILY MEDICINE | Facility: CLINIC | Age: 67
End: 2022-01-20
Payer: COMMERCIAL

## 2022-01-20 VITALS
HEART RATE: 77 BPM | OXYGEN SATURATION: 99 % | TEMPERATURE: 97.6 F | BODY MASS INDEX: 25.97 KG/M2 | RESPIRATION RATE: 12 BRPM | WEIGHT: 181.4 LBS | SYSTOLIC BLOOD PRESSURE: 128 MMHG | DIASTOLIC BLOOD PRESSURE: 93 MMHG | HEIGHT: 70 IN

## 2022-01-20 DIAGNOSIS — Q85.03 SCHWANNOMATOSIS (H): ICD-10-CM

## 2022-01-20 DIAGNOSIS — I10 ESSENTIAL HYPERTENSION: Primary | ICD-10-CM

## 2022-01-20 DIAGNOSIS — Z23 HIGH PRIORITY FOR 2019-NCOV VACCINE: ICD-10-CM

## 2022-01-20 PROCEDURE — 91306 COVID-19,PF,MODERNA (18+ YRS BOOSTER .25ML): CPT | Performed by: FAMILY MEDICINE

## 2022-01-20 PROCEDURE — 99214 OFFICE O/P EST MOD 30 MIN: CPT | Performed by: FAMILY MEDICINE

## 2022-01-20 PROCEDURE — 0064A COVID-19,PF,MODERNA (18+ YRS BOOSTER .25ML): CPT | Performed by: FAMILY MEDICINE

## 2022-01-20 RX ORDER — LOSARTAN POTASSIUM 25 MG/1
25 TABLET ORAL DAILY
Qty: 90 TABLET | Refills: 0 | Status: SHIPPED | OUTPATIENT
Start: 2022-01-20 | End: 2022-02-17 | Stop reason: DRUGHIGH

## 2022-01-20 ASSESSMENT — MIFFLIN-ST. JEOR: SCORE: 1619.08

## 2022-01-20 NOTE — PATIENT INSTRUCTIONS
I would recommend starting Losartan 25 mg daily in evening before bed.    Continue the Metoprolol dose.    Send me a message in 3 weeks with current BP.

## 2022-01-20 NOTE — PROGRESS NOTES
"  Assessment & Plan     Essential hypertension  Blood pressure elevated diastolically here and at home per his report.  He did have a borderline elevated microalbumin back in June as well.  At this point in time we will have him continue his metoprolol at 50 mg daily and add losartan 25 mg daily.  Follow-up blood pressures recommended at home and he will send blood pressure readings in the next 2 to 3 weeks.  If he is having good control with the losartan we can always transition to a higher dose of that and off the metoprolol if desired.  - losartan (COZAAR) 25 MG tablet; Take 1 tablet (25 mg) by mouth daily    Schwannomatosis (H)  Follow-up MRI and with neurosurgery as planned    High priority for 2019-nCoV vaccine  Booster given  - COVID-19,PF,MODERNA (18+ Yrs BOOSTER .25mL)    Review of the result(s) of each unique test - Thoracic and lumbar MRI  Prescription drug management         BMI:   Estimated body mass index is 26.03 kg/m  as calculated from the following:    Height as of this encounter: 1.778 m (5' 10\").    Weight as of this encounter: 82.3 kg (181 lb 6.4 oz).   Weight management plan: Discussed healthy diet and exercise guidelines    Patient Instructions   I would recommend starting Losartan 25 mg daily in evening before bed.    Continue the Metoprolol dose.    Send me a message in 3 weeks with current BP.          Return in about 5 months (around 6/20/2022) for wellness exam, Lab Work, chronic health problems.    Jannet Mijares MD  [facility] [place]    Dakota MONZON is a 66 year old who presents for the following health issues     HPI   Cough - at night over last few weeks, dryness in air, no fever.  Company was testing weekly until the last few weeks for COVID.  No illness symptoms noted    Hypertension Follow-up      Do you check your blood pressure regularly outside of the clinic? Yes     Are you following a low salt diet? Yes    Are your blood pressures ever more than 140 " "on the top number (systolic) OR more   than 90 on the bottom number (diastolic), for example 140/90? Yes      How many servings of fruits and vegetables do you eat daily?  2-3    On average, how many sweetened beverages do you drink each day (Examples: soda, juice, sweet tea, etc.  Do NOT count diet or artificially sweetened beverages)?   1    How many days per week do you exercise enough to make your heart beat faster? 7    How many minutes a day do you exercise enough to make your heart beat faster? 30 - 60    How many days per week do you miss taking your medication? 0    Schwannomatosis -he has follow-up planned with neurosurgery with an upcoming MRI next week.  Prior MRI was September 2020 and showed stable multiple schwannomas.  He is having no new symptoms.      Review of Systems   Constitutional, HEENT, cardiovascular, pulmonary, gi and gu systems are negative, except as otherwise noted.      Objective    BP (!) 128/93 (BP Location: Right arm, Patient Position: Sitting, Cuff Size: Adult Large)   Pulse 77   Temp 97.6  F (36.4  C) (Axillary)   Resp 12   Ht 1.778 m (5' 10\")   Wt 82.3 kg (181 lb 6.4 oz)   SpO2 99%   BMI 26.03 kg/m    Body mass index is 26.03 kg/m .  Physical Exam   GENERAL: healthy, alert and no distress  NECK: no adenopathy, no asymmetry, masses, or scars and thyroid normal to palpation  RESP: lungs clear to auscultation - no rales, rhonchi or wheezes  CV: regular rate and rhythm, normal S1 S2, no S3 or S4, no murmur, click or rub, no peripheral edema and peripheral pulses strong  MS: no gross musculoskeletal defects noted, no edema  BACK: no CVA tenderness, no paralumbar tenderness  PSYCH: mentation appears normal, affect normal/bright                    This chart was documented by provider using a voice activated software called Dragon in addition to manual typing. There may be vocabulary errors or other grammatical errors due to this.       "

## 2022-01-25 ENCOUNTER — ANCILLARY PROCEDURE (OUTPATIENT)
Dept: MRI IMAGING | Facility: CLINIC | Age: 67
End: 2022-01-25
Attending: NEUROLOGICAL SURGERY
Payer: COMMERCIAL

## 2022-01-25 ENCOUNTER — TELEPHONE (OUTPATIENT)
Dept: NEUROSURGERY | Facility: CLINIC | Age: 67
End: 2022-01-25

## 2022-01-25 DIAGNOSIS — Q85.03 SCHWANNOMATOSIS (H): ICD-10-CM

## 2022-01-25 PROCEDURE — A9585 GADOBUTROL INJECTION: HCPCS | Mod: JW | Performed by: STUDENT IN AN ORGANIZED HEALTH CARE EDUCATION/TRAINING PROGRAM

## 2022-01-25 PROCEDURE — 72158 MRI LUMBAR SPINE W/O & W/DYE: CPT | Performed by: STUDENT IN AN ORGANIZED HEALTH CARE EDUCATION/TRAINING PROGRAM

## 2022-01-25 PROCEDURE — 72157 MRI CHEST SPINE W/O & W/DYE: CPT | Performed by: STUDENT IN AN ORGANIZED HEALTH CARE EDUCATION/TRAINING PROGRAM

## 2022-01-25 RX ORDER — GADOBUTROL 604.72 MG/ML
10 INJECTION INTRAVENOUS ONCE
Status: COMPLETED | OUTPATIENT
Start: 2022-01-25 | End: 2022-01-25

## 2022-01-25 RX ADMIN — GADOBUTROL 8.5 ML: 604.72 INJECTION INTRAVENOUS at 10:17

## 2022-01-25 NOTE — TELEPHONE ENCOUNTER
As per Dr Mittal, MRIs stable, repeat in 1 year.   Called and notified patient.   Message/reminder sent to pool to order and schedule 1 year repeat.

## 2022-02-15 ENCOUNTER — MYC MEDICAL ADVICE (OUTPATIENT)
Dept: FAMILY MEDICINE | Facility: CLINIC | Age: 67
End: 2022-02-15
Payer: COMMERCIAL

## 2022-02-15 DIAGNOSIS — I10 ESSENTIAL HYPERTENSION: Primary | ICD-10-CM

## 2022-02-17 RX ORDER — LOSARTAN POTASSIUM 50 MG/1
50 TABLET ORAL DAILY
Qty: 90 TABLET | Refills: 0 | Status: SHIPPED | OUTPATIENT
Start: 2022-02-17 | End: 2022-05-19

## 2022-05-07 ENCOUNTER — HEALTH MAINTENANCE LETTER (OUTPATIENT)
Age: 67
End: 2022-05-07

## 2022-05-17 DIAGNOSIS — I10 ESSENTIAL HYPERTENSION: ICD-10-CM

## 2022-05-19 RX ORDER — LOSARTAN POTASSIUM 50 MG/1
TABLET ORAL
Qty: 90 TABLET | Refills: 0 | Status: SHIPPED | OUTPATIENT
Start: 2022-05-19 | End: 2022-06-23 | Stop reason: DRUGHIGH

## 2022-05-23 ENCOUNTER — OFFICE VISIT (OUTPATIENT)
Dept: FAMILY MEDICINE | Facility: CLINIC | Age: 67
End: 2022-05-23
Payer: COMMERCIAL

## 2022-05-23 VITALS
DIASTOLIC BLOOD PRESSURE: 92 MMHG | RESPIRATION RATE: 15 BRPM | TEMPERATURE: 97.3 F | OXYGEN SATURATION: 100 % | HEART RATE: 69 BPM | SYSTOLIC BLOOD PRESSURE: 132 MMHG

## 2022-05-23 DIAGNOSIS — Z13.1 SCREENING FOR DIABETES MELLITUS: ICD-10-CM

## 2022-05-23 DIAGNOSIS — E78.5 HYPERLIPIDEMIA LDL GOAL <130: ICD-10-CM

## 2022-05-23 DIAGNOSIS — Z23 NEED FOR SHINGLES VACCINE: ICD-10-CM

## 2022-05-23 DIAGNOSIS — I10 ESSENTIAL HYPERTENSION: ICD-10-CM

## 2022-05-23 DIAGNOSIS — Q85.03 SCHWANNOMATOSIS (H): ICD-10-CM

## 2022-05-23 DIAGNOSIS — B35.1 TOENAIL FUNGUS: ICD-10-CM

## 2022-05-23 DIAGNOSIS — Z00.00 ROUTINE GENERAL MEDICAL EXAMINATION AT A HEALTH CARE FACILITY: Primary | ICD-10-CM

## 2022-05-23 DIAGNOSIS — Z12.5 SCREENING FOR PROSTATE CANCER: ICD-10-CM

## 2022-05-23 LAB
ALBUMIN SERPL-MCNC: 3.8 G/DL (ref 3.4–5)
ALP SERPL-CCNC: 84 U/L (ref 40–150)
ALT SERPL W P-5'-P-CCNC: 46 U/L (ref 0–70)
ANION GAP SERPL CALCULATED.3IONS-SCNC: 4 MMOL/L (ref 3–14)
AST SERPL W P-5'-P-CCNC: 22 U/L (ref 0–45)
BILIRUB SERPL-MCNC: 0.9 MG/DL (ref 0.2–1.3)
BUN SERPL-MCNC: 17 MG/DL (ref 7–30)
CALCIUM SERPL-MCNC: 8.5 MG/DL (ref 8.5–10.1)
CHLORIDE BLD-SCNC: 105 MMOL/L (ref 94–109)
CHOLEST SERPL-MCNC: 181 MG/DL
CO2 SERPL-SCNC: 29 MMOL/L (ref 20–32)
CREAT SERPL-MCNC: 1.19 MG/DL (ref 0.66–1.25)
CREAT UR-MCNC: 95 MG/DL
ERYTHROCYTE [DISTWIDTH] IN BLOOD BY AUTOMATED COUNT: 12.9 % (ref 10–15)
FASTING STATUS PATIENT QL REPORTED: YES
GFR SERPL CREATININE-BSD FRML MDRD: 68 ML/MIN/1.73M2
GLUCOSE BLD-MCNC: 105 MG/DL (ref 70–99)
HCT VFR BLD AUTO: 46.8 % (ref 40–53)
HDLC SERPL-MCNC: 41 MG/DL
HGB BLD-MCNC: 15.4 G/DL (ref 13.3–17.7)
LDLC SERPL CALC-MCNC: 104 MG/DL
MCH RBC QN AUTO: 30 PG (ref 26.5–33)
MCHC RBC AUTO-ENTMCNC: 32.9 G/DL (ref 31.5–36.5)
MCV RBC AUTO: 91 FL (ref 78–100)
MICROALBUMIN UR-MCNC: 15 MG/L
MICROALBUMIN/CREAT UR: 15.79 MG/G CR (ref 0–17)
NONHDLC SERPL-MCNC: 140 MG/DL
PLATELET # BLD AUTO: 191 10E3/UL (ref 150–450)
POTASSIUM BLD-SCNC: 4.1 MMOL/L (ref 3.4–5.3)
PROT SERPL-MCNC: 7.6 G/DL (ref 6.8–8.8)
PSA SERPL-MCNC: 1.61 UG/L (ref 0–4)
RBC # BLD AUTO: 5.13 10E6/UL (ref 4.4–5.9)
SODIUM SERPL-SCNC: 138 MMOL/L (ref 133–144)
TRIGL SERPL-MCNC: 179 MG/DL
WBC # BLD AUTO: 4.8 10E3/UL (ref 4–11)

## 2022-05-23 PROCEDURE — 90471 IMMUNIZATION ADMIN: CPT | Performed by: FAMILY MEDICINE

## 2022-05-23 PROCEDURE — 99213 OFFICE O/P EST LOW 20 MIN: CPT | Mod: 25 | Performed by: FAMILY MEDICINE

## 2022-05-23 PROCEDURE — 82043 UR ALBUMIN QUANTITATIVE: CPT | Performed by: FAMILY MEDICINE

## 2022-05-23 PROCEDURE — 36415 COLL VENOUS BLD VENIPUNCTURE: CPT | Performed by: FAMILY MEDICINE

## 2022-05-23 PROCEDURE — 90750 HZV VACC RECOMBINANT IM: CPT | Performed by: FAMILY MEDICINE

## 2022-05-23 PROCEDURE — G0103 PSA SCREENING: HCPCS | Performed by: FAMILY MEDICINE

## 2022-05-23 PROCEDURE — 85027 COMPLETE CBC AUTOMATED: CPT | Performed by: FAMILY MEDICINE

## 2022-05-23 PROCEDURE — 80053 COMPREHEN METABOLIC PANEL: CPT | Performed by: FAMILY MEDICINE

## 2022-05-23 PROCEDURE — 99396 PREV VISIT EST AGE 40-64: CPT | Mod: 25 | Performed by: FAMILY MEDICINE

## 2022-05-23 PROCEDURE — 80061 LIPID PANEL: CPT | Performed by: FAMILY MEDICINE

## 2022-05-23 RX ORDER — METOPROLOL SUCCINATE 100 MG/1
100 TABLET, EXTENDED RELEASE ORAL DAILY
Qty: 90 TABLET | Refills: 1 | Status: SHIPPED | OUTPATIENT
Start: 2022-05-23 | End: 2022-06-23

## 2022-05-23 RX ORDER — CICLOPIROX 80 MG/ML
SOLUTION TOPICAL
Qty: 6 ML | Refills: 3 | Status: SHIPPED | OUTPATIENT
Start: 2022-05-23 | End: 2023-04-17

## 2022-05-23 ASSESSMENT — ENCOUNTER SYMPTOMS
NAUSEA: 0
HEMATOCHEZIA: 0
DIZZINESS: 0
PARESTHESIAS: 0
SHORTNESS OF BREATH: 0
SORE THROAT: 0
DIARRHEA: 0
DYSURIA: 0
HEADACHES: 0
ARTHRALGIAS: 0
COUGH: 0
JOINT SWELLING: 0
HEARTBURN: 0
MYALGIAS: 0
WEAKNESS: 0
FEVER: 0
FREQUENCY: 0
PALPITATIONS: 0
CHILLS: 0
EYE PAIN: 0
NERVOUS/ANXIOUS: 0
ABDOMINAL PAIN: 0
HEMATURIA: 0

## 2022-05-23 ASSESSMENT — PAIN SCALES - GENERAL: PAINLEVEL: NO PAIN (0)

## 2022-05-23 NOTE — PROGRESS NOTES
SUBJECTIVE:   CC: Shelia Ortiz is an 66 year old male who presents for preventative health visit.       Patient has been advised of split billing requirements and indicates understanding: Yes  Healthy Habits:     Getting at least 3 servings of Calcium per day:  Yes    Bi-annual eye exam:  Yes    Dental care twice a year:  Yes    Sleep apnea or symptoms of sleep apnea:  None    Diet:  Regular (no restrictions)    Frequency of exercise:  1 day/week    Duration of exercise:  Less than 15 minutes    Taking medications regularly:  Yes    Medication side effects:  None    PHQ-2 Total Score: 0    Additional concerns today:  No  walking daily.  Exercises in room -         Hypertension Follow-up      Do you check your blood pressure regularly outside of the clinic? Yes     Are you following a low salt diet? Yes    Are your blood pressures ever more than 140 on the top number (systolic) OR more   than 90 on the bottom number (diastolic), for example 140/90? Yes    Hyperlipidemia Follow-Up      Are you regularly taking any medication or supplement to lower your cholesterol?   No    Are you having muscle aches or other side effects that you think could be caused by your cholesterol lowering medication?  n/a    Schwannomatosis:  Follows with neurosurgery yearly - last MRI Jan 25, 2022 without change from previous scans.    Thickened toenails :  Wife using ciclopirox with some good results.        Today's PHQ-2 Score:   PHQ-2 ( 1999 Pfizer) 5/23/2022   Q1: Little interest or pleasure in doing things 0   Q2: Feeling down, depressed or hopeless 0   PHQ-2 Score 0   PHQ-2 Total Score (12-17 Years)- Positive if 3 or more points; Administer PHQ-A if positive -   Q1: Little interest or pleasure in doing things Not at all   Q2: Feeling down, depressed or hopeless Not at all   PHQ-2 Score 0       Abuse: Current or Past(Physical, Sexual or Emotional)- No  Do you feel safe in your environment? Yes    Have you ever done Advance Care Planning?  (For example, a Health Directive, POLST, or a discussion with a medical provider or your loved ones about your wishes): No, advance care planning information given to patient to review.  Patient plans to discuss their wishes with loved ones or provider.      Social History     Tobacco Use     Smoking status: Never Smoker     Smokeless tobacco: Never Used   Substance Use Topics     Alcohol use: No     If you drink alcohol do you typically have >3 drinks per day or >7 drinks per week? No    Alcohol Use 5/23/2022   Prescreen: >3 drinks/day or >7 drinks/week? No   Prescreen: >3 drinks/day or >7 drinks/week? -       Last PSA:   PSA   Date Value Ref Range Status   06/28/2021 0.98 0 - 4 ug/L Final     Comment:     Assay Method:  Chemiluminescence using Siemens Vista analyzer       Reviewed orders with patient. Reviewed health maintenance and updated orders accordingly - Yes  BP Readings from Last 3 Encounters:   05/23/22 (!) 142/104   01/20/22 (!) 128/93   06/28/21 136/89    Wt Readings from Last 3 Encounters:   01/20/22 82.3 kg (181 lb 6.4 oz)   06/28/21 81.6 kg (180 lb)   01/16/20 78.1 kg (172 lb 3.2 oz)                    Reviewed and updated as needed this visit by clinical staff   Tobacco  Allergies  Meds   Med Hx  Surg Hx  Fam Hx  Soc Hx          Reviewed and updated as needed this visit by Provider   Tobacco  Allergies  Meds   Med Hx  Surg Hx  Fam Hx  Soc Hx         Past Medical History:   Diagnosis Date     Hyperlipidemia LDL goal <130      Sebaceous cyst 3/20/08    left posterior shoulder      Past Surgical History:   Procedure Laterality Date     COLONOSCOPY  10/24/2013    Procedure: COLONOSCOPY;  Tubular adenoma of colon  pkt sent 10/7  rx sent  ;  Surgeon: Lenin Lopez MD;  Location: [facility] OR     COLONOSCOPY WITH CO2 INSUFFLATION N/A 1/29/2019    Procedure: COLONOSCOPY WITH CO2 INSUFFLATION;  Surgeon: Kaleb Morelos MD;  Location:  OR     [facility] LAPAROSCOPY, SURGICAL; APPENDECTOMY N/A  11/19/2019     LAMINECTOMY LUMBAR ONE LEVEL N/A 10/4/2019    Procedure: L1-L2 LAMINECTOMIES  AND RESECTION OF INTRADURAL MASS WITH SPINAL 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   HENT: Negative for congestion, ear pain, hearing loss and sore throat.    Eyes: Negative for pain and visual disturbance.   Respiratory: Negative for cough and shortness of breath.    Cardiovascular: Negative for chest pain, palpitations and peripheral edema.   Gastrointestinal: Negative for abdominal pain, diarrhea, heartburn, hematochezia and nausea.   Genitourinary: Negative for dysuria, frequency, genital sores, hematuria, impotence, penile discharge and urgency.   Musculoskeletal: Negative for arthralgias, joint swelling and myalgias.   Skin: Negative for rash.   Neurological: Negative for dizziness, weakness, headaches and paresthesias.   Psychiatric/Behavioral: Negative for mood changes. The patient is not nervous/anxious.          OBJECTIVE:   BP (!) 132/92   Pulse 69   Temp 97.3  F (36.3  C) (Oral)   Resp 15   SpO2 100%     Physical Exam  GENERAL: healthy, alert, no distress and over weight  EYES: Eyes grossly normal to inspection, PERRL and conjunctivae and sclerae normal  HENT: ear canals and TM's normal, nose and mouth without ulcers or lesions  NECK: no adenopathy, no asymmetry, masses, or scars and thyroid normal to palpation  RESP: lungs clear to auscultation - no rales, rhonchi or wheezes  CV: regular rate and rhythm, normal S1 S2, no S3 or S4, no murmur, click or rub, no peripheral edema and peripheral pulses strong  ABDOMEN: soft, nontender, no hepatosplenomegaly, no masses and bowel sounds normal  RECTAL: normal sphincter tone, no rectal masses and prostate 1+ enlarged, nontender  MS: no gross musculoskeletal defects noted, no edema  SKIN: no suspicious lesions or rashes and thickened nails 2-5  bilaterally  No surrounding skin abnormality.    NEURO: Normal strength and tone, mentation intact and speech normal  PSYCH: mentation appears normal, affect normal/bright    Diagnostic Test Results:  Labs reviewed in Epic  Results for orders placed or performed in visit on 05/23/22   CBC with platelets     Status: Normal   Result Value Ref Range    WBC Count 4.8 4.0 - 11.0 10e3/uL    RBC Count 5.13 4.40 - 5.90 10e6/uL    Hemoglobin 15.4 13.3 - 17.7 g/dL    Hematocrit 46.8 40.0 - 53.0 %    MCV 91 78 - 100 fL    MCH 30.0 26.5 - 33.0 pg    MCHC 32.9 31.5 - 36.5 g/dL    RDW 12.9 10.0 - 15.0 %    Platelet Count 191 150 - 450 10e3/uL       ASSESSMENT/PLAN:   (Z00.00) Routine general medical examination at a health care facility  (primary encounter diagnosis)  Comment: fasting  Plan: CBC with platelets        Screening and preventive care discussed.    (I10) Essential hypertension  Comment: borderline elevated BP here and at home as well.   Plan: Comprehensive metabolic panel (BMP + Alb, Alk         Phos, ALT, AST, Total. Bili, TP), Albumin         Random Urine Quantitative with Creat Ratio,         metoprolol succinate ER (TOPROL XL) 100 MG 24         hr tablet        Continue the losartan-hydrochlorothiazide at current 100-25 mg dose.  Increase metoprolol to 100 mg daily.  Monitor BP at home and send readings in 2-4 weeks.      (E78.5) Hyperlipidemia LDL goal <130  Comment: fasting labs.  Plan: Comprehensive metabolic panel (BMP + Alb, Alk         Phos, ALT, AST, Total. Bili, TP), Lipid panel         reflex to direct LDL Fasting            (Q85.03) Schwannomatosis (H)  Comment: s/p   Plan: continue management with Neurosurgery.    (B35.1) Toenail fungus  Comment: treatment options discussed.  Topical treatment planned.  Plan: ciclopirox (CICLODAN) 8 % external solution            (Z12.5) Screening for prostate cancer  Comment: screening  Plan: PSA, screen            (Z13.1) Screening for diabetes mellitus  Comment:  "screening planned.  Plan: Comprehensive metabolic panel (BMP + Alb, Alk         Phos, ALT, AST, Total. Bili, TP)            (Z23) Need for shingles vaccine  Comment: complete series today.  Plan: ZOSTER VACCINE RECOMBINANT ADJUVANTED         (SHINGRIX)              Patient has been advised of split billing requirements and indicates understanding: Yes    COUNSELING:   Reviewed preventive health counseling, as reflected in patient instructions       Regular exercise       Healthy diet/nutrition       Vision screening       Immunizations    Vaccinated for: Zoster and Declined: COVID due to Other planning to get at later date.               HIV screeninx in teen years, 1x in adult years, and at intervals if high risk       Colorectal cancer screening       Prostate cancer screening       Osteoporosis prevention/bone health    Estimated body mass index is 26.03 kg/m  as calculated from the following:    Height as of 22: 1.778 m (5' 10\").    Weight as of 22: 82.3 kg (181 lb 6.4 oz).     Weight management plan: Discussed healthy diet and exercise guidelines    He reports that he has never smoked. He has never used smokeless tobacco.      Counseling Resources:  ATP IV Guidelines  Pooled Cohorts Equation Calculator  FRAX Risk Assessment  ICSI Preventive Guidelines  Dietary Guidelines for Americans,   [employer]'s MyPlate  ASA Prophylaxis  Lung CA Screening    Jannet Mijares MD  [facility]    Patient Instructions   Labs today.    For BP treatment - continue Losartan at 50 mg daily.  Increase Metoprolol to 100 mg daily.  You can take 2 of the current metoprolol pills until they are gone. The new script will be 100 mg tablets.    Trial of Ciclopriox for the fungal nail treatment.    "

## 2022-05-23 NOTE — PATIENT INSTRUCTIONS
Labs today.    For BP treatment - continue Losartan at 50 mg daily.  Increase Metoprolol to 100 mg daily.  You can take 2 of the current metoprolol pills until they are gone. The new script will be 100 mg tablets.    Trial of Ciclopriox for the fungal nail treatment.    Preventive Health Recommendations  Male Ages 50 - 64    Yearly exam:             See your health care provider every year in order to  o   Review health changes.   o   Discuss preventive care.    o   Review your medicines if your doctor has prescribed any.   Have a cholesterol test every 5 years, or more frequently if you are at risk for high cholesterol/heart disease.   Have a diabetes test (fasting glucose) every three years. If you are at risk for diabetes, you should have this test more often.   Have a colonoscopy at age 50, or have a yearly FIT test (stool test). These exams will check for colon cancer.    Talk with your health care provider about whether or not a prostate cancer screening test (PSA) is right for you.  You should be tested each year for STDs (sexually transmitted diseases), if you re at risk.     Shots: Get a flu shot each year. Get a tetanus shot every 10 years.     Nutrition:  Eat at least 5 servings of fruits and vegetables daily.   Eat whole-grain bread, whole-wheat pasta and brown rice instead of white grains and rice.   Get adequate Calcium and Vitamin D.     Lifestyle  Exercise for at least 150 minutes a week (30 minutes a day, 5 days a week). This will help you control your weight and prevent disease.   Limit alcohol to one drink per day.   No smoking.   Wear sunscreen to prevent skin cancer.   See your dentist every six months for an exam and cleaning.   See your eye doctor every 1 to 2 years.

## 2022-05-23 NOTE — PROGRESS NOTES
Prior to immunization administration, verified patients identity using patient s name and date of birth. Please see Immunization Activity for additional information.     Screening Questionnaire for Adult Immunization    Are you sick today?   No   Do you have allergies to medications, food, a vaccine component or latex?   No   Have you ever had a serious reaction after receiving a vaccination?   No   Do you have a long-term health problem with heart, lung, kidney, or metabolic disease (e.g., diabetes), asthma, a blood disorder, no spleen, complement component deficiency, a cochlear implant, or a spinal fluid leak?  Are you on long-term aspirin therapy?   No   Do you have cancer, leukemia, HIV/AIDS, or any other immune system problem?   No   Do you have a parent, brother, or sister with an immune system problem?   No   In the past 3 months, have you taken medications that affect  your immune system, such as prednisone, other steroids, or anticancer drugs; drugs for the treatment of rheumatoid arthritis, Crohn s disease, or psoriasis; or have you had radiation treatments?   No   Have you had a seizure, or a brain or other nervous system problem?   No   During the past year, have you received a transfusion of blood or blood    products, or been given immune (gamma) globulin or antiviral drug?   No   For women: Are you pregnant or is there a chance you could become       pregnant during the next month?   No   Have you received any vaccinations in the past 4 weeks?   No     Immunization questionnaire answers were all negative.        Per orders of Dr. Mijares, injection of Shingrex given by Tiffanie Sanchez RN. Patient instructed to remain in clinic for 15 minutes afterwards, and to report any adverse reaction to me immediately.       Screening performed by Tiffanie Sanchez RN on 5/23/2022 at 8:37 AM.

## 2022-06-01 ENCOUNTER — MYC MEDICAL ADVICE (OUTPATIENT)
Dept: FAMILY MEDICINE | Facility: CLINIC | Age: 67
End: 2022-06-01
Payer: COMMERCIAL

## 2022-06-03 NOTE — TELEPHONE ENCOUNTER
"Called patient, left message with [facility] phone number to call back.       Dianne Fair RN, [facility]      Alert for this patient not reading his [employer] message    Your urine testing is normal.  There is not elevated protein present.  This is improved from previous.  Your blood sugar is borderline elevated.  This is in the \"prediabetic\" range.  Exercise and limiting carbohydrate and sugars in diet can be helpful at preventing progression to diabetes.  Your kidney and liver testing are normal.  Your prostate cancer screening is normal/negative.  Your cholesterol levels are similar to previous. Considering this and other risk factors you may benefit from cholesterol lowering medication to protect against heart disease.  Let me know your thoughts on this.  Please call or [employer] message me if you have any questions.      PSK    "

## 2022-06-06 NOTE — TELEPHONE ENCOUNTER
Pt states he has now read the result note in mychart and does not have any questions.  Emy Boyle BSN, RN

## 2022-06-08 ENCOUNTER — MYC MEDICAL ADVICE (OUTPATIENT)
Dept: FAMILY MEDICINE | Facility: CLINIC | Age: 67
End: 2022-06-08

## 2022-06-08 DIAGNOSIS — I10 ESSENTIAL HYPERTENSION: Primary | ICD-10-CM

## 2022-06-15 ENCOUNTER — ALLIED HEALTH/NURSE VISIT (OUTPATIENT)
Dept: FAMILY MEDICINE | Facility: CLINIC | Age: 67
End: 2022-06-15
Payer: COMMERCIAL

## 2022-06-15 VITALS — DIASTOLIC BLOOD PRESSURE: 80 MMHG | HEART RATE: 77 BPM | SYSTOLIC BLOOD PRESSURE: 138 MMHG

## 2022-06-15 DIAGNOSIS — Z01.30 BLOOD PRESSURE CHECK: Primary | ICD-10-CM

## 2022-06-15 PROCEDURE — 99207 PR NO CHARGE NURSE ONLY: CPT

## 2022-06-15 NOTE — PROGRESS NOTES
"Shelia Ortiz in for blood pressure check.  Blood pressure medications on the med list were reviewed with patient.    Patient has taken all medications as per usual regimen: Yes  Patient reports tolerating them without any issues or concerns: Patient states that after metoprolol increase patient felt fatigue. Patient stated that he \"initiated decrease back to 50 mg.\"     Vitals:    06/15/22 0901   BP: 138/80   Pulse: 77       Blood pressure was taken, previous encounter was reviewed, recorded blood pressure below 140/90.  Patient was discharged and the note will be sent to the provider for final review.    I made a virtual appointment with patient for a visit with Dr. Mijares to discuss his medication changes.   "

## 2022-06-23 ENCOUNTER — VIRTUAL VISIT (OUTPATIENT)
Dept: FAMILY MEDICINE | Facility: CLINIC | Age: 67
End: 2022-06-23
Payer: COMMERCIAL

## 2022-06-23 ENCOUNTER — MYC MEDICAL ADVICE (OUTPATIENT)
Dept: FAMILY MEDICINE | Facility: CLINIC | Age: 67
End: 2022-06-23

## 2022-06-23 DIAGNOSIS — I10 ESSENTIAL HYPERTENSION: Primary | ICD-10-CM

## 2022-06-23 PROCEDURE — 99214 OFFICE O/P EST MOD 30 MIN: CPT | Mod: GT | Performed by: FAMILY MEDICINE

## 2022-06-23 RX ORDER — METOPROLOL SUCCINATE 100 MG/1
50 TABLET, EXTENDED RELEASE ORAL DAILY
Qty: 90 TABLET | Refills: 1 | COMMUNITY
Start: 2022-06-23 | End: 2023-01-13 | Stop reason: DRUGHIGH

## 2022-06-23 RX ORDER — LOSARTAN POTASSIUM 100 MG/1
100 TABLET ORAL DAILY
Qty: 90 TABLET | Refills: 0 | Status: SHIPPED | OUTPATIENT
Start: 2022-06-23 | End: 2022-09-21

## 2022-06-23 NOTE — PATIENT INSTRUCTIONS
Decrease the metoprolol to 1/2 pill day for dose of 50 mg .    Increase the Losartan to 100 mg daily - take 2 of the 50 mg until gone.   I am sending in a 100 mg pill for future refills.    Please forward copy of the [facility].  Monitor BP with medication changes and send readings again after 2-3 weeks.  Reach out sooner if elevated BP's or side effects noted.

## 2022-06-23 NOTE — PROGRESS NOTES
NICOLÁS is a 67 year old who is being evaluated via a billable video visit.      How would you like to obtain your AVS? MyChart  If the video visit is dropped, the invitation should be resent by: Text to cell phone: [phone]  Will anyone else be joining your video visit? No          Assessment & Plan     Essential hypertension  BP not fully controlled at home.  Side effects with fatigue noted.  Had recent labs in May without abnormality to explain fatigue.  No TSH done but other testing for possible sources of fatigue tested.  Fatigue symptoms coincided with the increase in metoprolol dose to 100 mg .  Recommendations:  Decrease metoprolol to 50 mg daily.   Increase Losartan to 100 mg daily.  Monitor BP at home.  Send readings in 2-3 weeks.  Follow up sooner for side effects or elevated BP with change in medication.   - losartan (COZAAR) 100 MG tablet; Take 1 tablet (100 mg) by mouth daily  - metoprolol succinate ER (TOPROL XL) 100 MG 24 hr tablet; Take 0.5 tablets (50 mg) by mouth daily    Prescription drug management         Patient Instructions   Decrease the metoprolol to 1/2 pill day for dose of 50 mg .    Increase the Losartan to 100 mg daily - take 2 of the 50 mg until gone.   I am sending in a 100 mg pill for future refills.    Please forward copy of the recent BP's you have seen over [employer].  Monitor BP with medication changes and send readings again after 2-3 weeks.  Reach out sooner if elevated BP's or side effects noted.       Return in about 3 months (around 9/23/2022) for BP Recheck.    Jannet Mijares MD  [facility]   NICOLÁS is a 67 year old presenting for the following health issues:  Hypertension      History of Present Illness       Hypertension: He presents for follow up of hypertension.  He does check blood pressure  regularly outside of the clinic. Outside blood pressures have been over 140/90. He follows a low salt diet.       Since last visit -  increase to the Metoprolol to 100 mg  - first week fatigued - low HR 51 (1/2 time around 60).  Then reduced to 50 mg for 2 days then back to 100 mg dose - now.  Last couple of weeks can have 144-146/90's   Taking medication in evening     Generally in recent weeks - Fatigue present.  No SOB.          Review of Systems   Constitutional, HEENT, cardiovascular, pulmonary, gi and gu systems are negative, except as otherwise noted.      Objective    Vitals - Patient Reported  Systolic (Patient Reported): 137  Diastolic (Patient Reported): (!) 97  Pulse (Patient Reported): 75  Pain Score: No Pain (0)        Physical Exam   GENERAL: Healthy, alert and no distress  EYES: Eyes grossly normal to inspection.  No discharge or erythema, or obvious scleral/conjunctival abnormalities.  RESP: No audible wheeze, cough, or visible cyanosis.  No visible retractions or increased work of breathing.    SKIN: Visible skin clear. No significant rash, abnormal pigmentation or lesions.  NEURO: Cranial nerves grossly intact.  Mentation and speech appropriate for age.  PSYCH: Mentation appears normal, affect normal/bright, judgement and insight intact, normal speech and appearance well-groomed.    Office Visit on 05/23/2022   Component Date Value Ref Range Status     Sodium 05/23/2022 138  133 - 144 mmol/L Final     Potassium 05/23/2022 4.1  3.4 - 5.3 mmol/L Final     Chloride 05/23/2022 105  94 - 109 mmol/L Final     Carbon Dioxide (CO2) 05/23/2022 29  20 - 32 mmol/L Final     Anion Gap 05/23/2022 4  3 - 14 mmol/L Final     Urea Nitrogen 05/23/2022 17  7 - 30 mg/dL Final     Creatinine 05/23/2022 1.19  0.66 - 1.25 mg/dL Final     Calcium 05/23/2022 8.5  8.5 - 10.1 mg/dL Final     Glucose 05/23/2022 105 (A) 70 - 99 mg/dL Final     Alkaline Phosphatase 05/23/2022 84  40 - 150 U/L Final     AST 05/23/2022 22  0 - 45 U/L Final     ALT 05/23/2022 46  0 - 70 U/L Final     Protein Total 05/23/2022 7.6  6.8 - 8.8 g/dL Final     Albumin 05/23/2022 3.8   3.4 - 5.0 g/dL Final     Bilirubin Total 05/23/2022 0.9  0.2 - 1.3 mg/dL Final     GFR Estimate 05/23/2022 68  >60 mL/min/1.73m2 Final    Effective December 21, 2021 eGFRcr in adults is calculated using the 2021 CKD-EPI creatinine equation which includes age and gender (Rafael et al., [facility], DOI: 10.1056/AKMBum4049608)     WBC Count 05/23/2022 4.8  4.0 - 11.0 10e3/uL Final     RBC Count 05/23/2022 5.13  4.40 - 5.90 10e6/uL Final     Hemoglobin 05/23/2022 15.4  13.3 - 17.7 g/dL Final     Hematocrit 05/23/2022 46.8  40.0 - 53.0 % Final     MCV 05/23/2022 91  78 - 100 fL Final     MCH 05/23/2022 30.0  26.5 - 33.0 pg Final     MCHC 05/23/2022 32.9  31.5 - 36.5 g/dL Final     RDW 05/23/2022 12.9  10.0 - 15.0 % Final     Platelet Count 05/23/2022 191  150 - 450 10e3/uL Final     Cholesterol 05/23/2022 181  <200 mg/dL Final     Triglycerides 05/23/2022 179 (A) <150 mg/dL Final     Direct Measure HDL 05/23/2022 41  >=40 mg/dL Final     LDL Cholesterol Calculated 05/23/2022 104 (A) <=100 mg/dL Final     Non HDL Cholesterol 05/23/2022 140 (A) <130 mg/dL Final     Patient Fasting > 8hrs? 05/23/2022 Yes   Final     Creatinine Urine mg/dL 05/23/2022 95  mg/dL Final     Albumin Urine mg/L 05/23/2022 15  mg/L Final     Albumin Urine mg/g Cr 05/23/2022 15.79  0.00 - 17.00 mg/g Cr Final     Prostate Specific Antigen Screen 05/23/2022 1.61  0.00 - 4.00 ug/L Final               Video-Visit Details    Video Start Time: 4:43 PM    Type of service:  Video Visit    Video End Time:5:01 PM    Originating Location (pt. Location): Home    Distant Location (provider location):  [facility]     Platform used for Video Visit: Charli Kelly

## 2022-08-03 RX ORDER — HYDROCHLOROTHIAZIDE 12.5 MG/1
12.5 TABLET ORAL DAILY
Qty: 90 TABLET | Refills: 0 | Status: SHIPPED | OUTPATIENT
Start: 2022-08-03 | End: 2022-10-26 | Stop reason: ALTCHOICE

## 2022-08-15 ENCOUNTER — MYC MEDICAL ADVICE (OUTPATIENT)
Dept: FAMILY MEDICINE | Facility: CLINIC | Age: 67
End: 2022-08-15

## 2022-09-21 ENCOUNTER — MYC REFILL (OUTPATIENT)
Dept: FAMILY MEDICINE | Facility: CLINIC | Age: 67
End: 2022-09-21

## 2022-09-21 DIAGNOSIS — I10 ESSENTIAL HYPERTENSION: ICD-10-CM

## 2022-09-21 RX ORDER — LOSARTAN POTASSIUM 100 MG/1
100 TABLET ORAL DAILY
Qty: 90 TABLET | Refills: 0 | Status: CANCELLED | OUTPATIENT
Start: 2022-09-21

## 2022-10-24 ENCOUNTER — MYC MEDICAL ADVICE (OUTPATIENT)
Dept: FAMILY MEDICINE | Facility: CLINIC | Age: 67
End: 2022-10-24

## 2022-10-24 DIAGNOSIS — I10 ESSENTIAL HYPERTENSION: Primary | ICD-10-CM

## 2022-10-26 RX ORDER — LOSARTAN POTASSIUM AND HYDROCHLOROTHIAZIDE 12.5; 5 MG/1; MG/1
1 TABLET ORAL DAILY
Qty: 90 TABLET | Refills: 1 | Status: SHIPPED | OUTPATIENT
Start: 2022-10-26 | End: 2023-04-11

## 2022-11-27 ENCOUNTER — MYC MEDICAL ADVICE (OUTPATIENT)
Dept: FAMILY MEDICINE | Facility: CLINIC | Age: 67
End: 2022-11-27

## 2022-12-15 ENCOUNTER — MYC MEDICAL ADVICE (OUTPATIENT)
Dept: NEUROSURGERY | Facility: CLINIC | Age: 67
End: 2022-12-15

## 2022-12-15 DIAGNOSIS — Q85.03 SCHWANNOMATOSIS (H): Primary | ICD-10-CM

## 2022-12-15 NOTE — TELEPHONE ENCOUNTER
Per Dr. Mittal, patient needs repeat Lumbar/Thoracic MRIs 1 year from 1/25/22 -    Order placed. Pt updated.

## 2022-12-19 ENCOUNTER — OFFICE VISIT (OUTPATIENT)
Dept: FAMILY MEDICINE | Facility: CLINIC | Age: 67
End: 2022-12-19
Payer: COMMERCIAL

## 2022-12-19 VITALS
DIASTOLIC BLOOD PRESSURE: 81 MMHG | HEIGHT: 70 IN | TEMPERATURE: 97.7 F | RESPIRATION RATE: 16 BRPM | WEIGHT: 178.2 LBS | OXYGEN SATURATION: 99 % | BODY MASS INDEX: 25.51 KG/M2 | SYSTOLIC BLOOD PRESSURE: 137 MMHG | HEART RATE: 89 BPM

## 2022-12-19 DIAGNOSIS — L72.3 SEBACEOUS CYST: Primary | ICD-10-CM

## 2022-12-19 DIAGNOSIS — Z23 HIGH PRIORITY FOR 2019-NCOV VACCINE: ICD-10-CM

## 2022-12-19 DIAGNOSIS — Z23 NEED FOR PNEUMOCOCCAL VACCINE: ICD-10-CM

## 2022-12-19 DIAGNOSIS — Z23 NEED FOR PROPHYLACTIC VACCINATION AND INOCULATION AGAINST INFLUENZA: ICD-10-CM

## 2022-12-19 PROCEDURE — 91313 COVID-19 VACCINE BIVALENT BOOSTER 18+ (MODERNA): CPT | Performed by: FAMILY MEDICINE

## 2022-12-19 PROCEDURE — 90471 IMMUNIZATION ADMIN: CPT | Performed by: FAMILY MEDICINE

## 2022-12-19 PROCEDURE — 90662 IIV NO PRSV INCREASED AG IM: CPT | Performed by: FAMILY MEDICINE

## 2022-12-19 PROCEDURE — 11403 EXC TR-EXT B9+MARG 2.1-3CM: CPT | Performed by: FAMILY MEDICINE

## 2022-12-19 PROCEDURE — 0134A COVID-19 VACCINE BIVALENT BOOSTER 18+ (MODERNA): CPT | Performed by: FAMILY MEDICINE

## 2022-12-19 PROCEDURE — 90677 PCV20 VACCINE IM: CPT | Performed by: FAMILY MEDICINE

## 2022-12-19 PROCEDURE — 90472 IMMUNIZATION ADMIN EACH ADD: CPT | Performed by: FAMILY MEDICINE

## 2022-12-19 ASSESSMENT — PAIN SCALES - GENERAL: PAINLEVEL: NO PAIN (0)

## 2022-12-19 NOTE — PATIENT INSTRUCTIONS
Return to clinic for suture removal in 7-10 days    Incision Care: Sutures (stitches) or surgical staples are used to close incisions. They also help stop bleeding and speed healing. Instead of sutures, your wound may have been closed with special strips of tape called Steri-Strips. Treat these the same way you would sutures. To help your incision heal, follow the tips on this handout.    Keeping Your Incision Clean and Dry:  Avoid doing things that could cause dirt or sweat to get on your incision.  Don t pick at scabs. They help protect the wound.  Shower normally but do not submerge in water.    If sutures get damp, pat them dry.    Changing Your Dressing: Leave the dressing (bandage) in place until you are told to remove it or change it. Change it only as directed, using clean hands.  After the first 12 hours, change your dressing every 24 hours.  Change your dressing if it gets wet or soiled.    Call Your Health Care Provider If You Notice Any of These Signs:  Increased soreness, pain, or tenderness after 24 hours  A red streak, increased redness, or puffiness near the wound  White, yellowish, or bad-smelling discharge from the wound  Bleeding that can t be stopped by applying pressure  Steri-Strips fall off or stitches dissolve before the wound heals  Fever above 101.0 F

## 2022-12-19 NOTE — PROGRESS NOTES
"  Assessment & Plan     Sebaceous cyst  Excision as noted.  Return to clinic for suture removal.   - 2.1-3CM TRUNK,ARM,LEG    High priority for 2019-nCoV vaccine  Booster today.  - COVID-19,PF,MODERNA BIVALENT 18+Yrs    Need for prophylactic vaccination and inoculation against influenza  Seasonal vaccine  - INFLUENZA, QUAD, HIGH DOSE, PF, 65YR + (FLUZONE HD)    Need for pneumococcal vaccine  - Pneumococcal 20 Valent Conjugate (PCV20)             BMI:   Estimated body mass index is 25.94 kg/m  as calculated from the following:    Height as of this encounter: 1.765 m (5' 9.5\").    Weight as of this encounter: 80.8 kg (178 lb 3.2 oz).   Weight management plan: Discussed healthy diet and exercise guidelines    Patient Instructions   Return to clinic for suture removal in 7-10 days    Incision Care: Sutures (stitches) or surgical staples are used to close incisions. They also help stop bleeding and speed healing. Instead of sutures, your wound may have been closed with special strips of tape called Steri-Strips. Treat these the same way you would sutures. To help your incision heal, follow the tips on this handout.    Keeping Your Incision Clean and Dry:    Avoid doing things that could cause dirt or sweat to get on your incision.    Don t pick at scabs. They help protect the wound.    Shower normally but do not submerge in water.      If sutures get damp, pat them dry.    Changing Your Dressing: Leave the dressing (bandage) in place until you are told to remove it or change it. Change it only as directed, using clean hands.    After the first 12 hours, change your dressing every 24 hours.    Change your dressing if it gets wet or soiled.    Call Your Health Care Provider If You Notice Any of These Signs:    Increased soreness, pain, or tenderness after 24 hours    A red streak, increased redness, or puffiness near the wound    White, yellowish, or bad-smelling discharge from the wound    Bleeding that can t be stopped " "by applying pressure    Steri-Strips fall off or stitches dissolve before the wound heals    Fever above 101.0 F                Return in about 1 week (around 12/26/2022) for suture removal.    Jannet Mijares MD  [facility] CHRISTY MONZON is a 67 year old presenting for the following health issues:  cyst removal      History of Present Illness       Reason for visit:  Remove cyst    He eats 2-3 servings of fruits and vegetables daily.He consumes 1 sweetened beverage(s) daily.He exercises with enough effort to increase his heart rate 20 to 29 minutes per day.  He exercises with enough effort to increase his heart rate 4 days per week.   He is taking medications regularly.     BP at home 120/80 or lower.      Reports long standing cyst on the left posterior shoulder.  Has been treated 2 times in past - most recently 15 years ago with what he describes to be an I&D with healing by secondary intention.  \"hole left in shoulder to heal\"   The cyst recurred in the area a few years ago and has gotten gradually bigger.  Did have some liquid drainage from the area last week and is smaller now.      Review of Systems   Constitutional, HEENT, cardiovascular, pulmonary, gi and gu systems are negative, except as otherwise noted.      Objective    /81   Pulse 89   Temp 97.7  F (36.5  C) (Oral)   Resp 16   Ht 1.765 m (5' 9.5\")   Wt 80.8 kg (178 lb 3.2 oz)   SpO2 99%   BMI 25.94 kg/m    Body mass index is 25.94 kg/m .  Physical Exam   GENERAL: healthy, alert and no distress  NECK: no adenopathy, no asymmetry, masses, or scars and thyroid normal to palpation  RESP: lungs clear to auscultation - no rales, rhonchi or wheezes  CV: regular rate and rhythm, normal S1 S2, no S3 or S4, no murmur, click or rub, no peripheral edema and peripheral pulses strong  SKIN:  Left posterior shoulder with cyst present 2.5 cm, 2 blackened pores present.  No erythema or induration.             "         Subjective: Shelia Ortiz a 67 year old male who presents today for lesion removal. The lesion is located on the left posterior shoulder,  and measures 2.5 cm.  He denies other significant symptoms on ROS. Medications reviewed.    Objective: The area was prepped and appropriately anesthetized. Using the usual technique, cyst incision and removal was performed. The total area excised, including lesion and margins was 2.5 cm. Five 4-0 ethilon simple interrupted sutures placed.   An appropriate  dressing was applied.  The procedure was well tolerated and without complications. Specimen was not sent.    Assessment: sebaceous cyst    Plan: Follow up: Return for suture removal in 7-10 days. Wound care instructions provided.  Patient was instructed to be alert for any signs of cutaneous infection.

## 2022-12-28 ENCOUNTER — ALLIED HEALTH/NURSE VISIT (OUTPATIENT)
Dept: FAMILY MEDICINE | Facility: CLINIC | Age: 67
End: 2022-12-28
Payer: COMMERCIAL

## 2022-12-28 DIAGNOSIS — Z48.02 VISIT FOR SUTURE REMOVAL: Primary | ICD-10-CM

## 2022-12-28 PROCEDURE — 99207 PR NO CHARGE NURSE ONLY: CPT

## 2022-12-28 NOTE — PROGRESS NOTES
Suture removal:     Date sutures applied: 12/19/22         Where (setting) in which they applied: [facility]    Description:  Type: sutures  Location: Back of L Shoulder    History:    Cause of laceration: Cyst removal    Accompanying Signs & Symptoms: (staff: if yes-describe)  Redness: No  Warmth: No  Drainage: YES- Very little  Still bleeding: No  Fevers: No    Last tetanus shot: last tetanus booster within 10 years    Five sutures removed from pt with 5 sutures being placed during initial procedure. Pt wasn't in any pain, wound is healing as expected and well approximated. No S/S of infection. Provided pt with home care instructions, pt verbalized understanding. No further questions or concerns.    Elio Lin RN  9:29 AM  December 28, 2022

## 2023-01-11 ENCOUNTER — MYC MEDICAL ADVICE (OUTPATIENT)
Dept: FAMILY MEDICINE | Facility: CLINIC | Age: 68
End: 2023-01-11

## 2023-01-11 DIAGNOSIS — I10 ESSENTIAL HYPERTENSION: ICD-10-CM

## 2023-01-12 NOTE — TELEPHONE ENCOUNTER
Patient states he has been taking 25 mg of metoprolol succinate ER and not 50 mg. Order currently is for 50 mg after breaking 100 mg in half. Routing to provider to review and advise on dosage of metoprolol succinate ER.    Carla Morris RN    [facility]

## 2023-01-13 RX ORDER — METOPROLOL SUCCINATE 50 MG/1
25 TABLET, EXTENDED RELEASE ORAL DAILY
Qty: 45 TABLET | Refills: 1 | Status: SHIPPED | OUTPATIENT
Start: 2023-01-13 | End: 2023-04-13

## 2023-04-13 DIAGNOSIS — I10 ESSENTIAL HYPERTENSION: ICD-10-CM

## 2023-04-13 RX ORDER — METOPROLOL SUCCINATE 50 MG/1
25 TABLET, EXTENDED RELEASE ORAL DAILY
Qty: 45 TABLET | Refills: 1 | Status: SHIPPED | OUTPATIENT
Start: 2023-04-13 | End: 2023-04-14 | Stop reason: DRUGHIGH

## 2023-04-13 NOTE — TELEPHONE ENCOUNTER
[facility] phone call message- patient requests medication or medication refill:refill      If this is a refill request, has the caller requested the refill from the pharmacy already? Yes  Name of the pharmacy and phone number for the current request:  metoprolol succinate ER (TOPROL XL) 50 MG 24 hr tablet    Name of the medication requested: [facility]/pharmacy #[id number] - [place], [place] - [address]     Other request:     OK to leave the result message on voice mail or with a family member? Not Applicable    par Call taken on 4/13/2023 at 1:28 PM by Guy Friend MA

## 2023-04-14 ENCOUNTER — TELEPHONE (OUTPATIENT)
Dept: FAMILY MEDICINE | Facility: CLINIC | Age: 68
End: 2023-04-14
Payer: COMMERCIAL

## 2023-04-14 DIAGNOSIS — I10 ESSENTIAL HYPERTENSION: ICD-10-CM

## 2023-04-14 RX ORDER — METOPROLOL SUCCINATE 50 MG/1
TABLET, EXTENDED RELEASE ORAL DAILY
Qty: 45 TABLET | Refills: 1 | Status: CANCELLED | OUTPATIENT
Start: 2023-04-14

## 2023-04-14 RX ORDER — METOPROLOL SUCCINATE 25 MG/1
25 TABLET, EXTENDED RELEASE ORAL DAILY
Qty: 90 TABLET | Refills: 0 | Status: SHIPPED | OUTPATIENT
Start: 2023-04-14 | End: 2023-07-06 | Stop reason: DRUGHIGH

## 2023-04-14 NOTE — TELEPHONE ENCOUNTER
Provider, patient is requesting Metoprolol 25mg versus 50mg. Looks like patient is cutting the 50mg in half per instructions.         Please advise. Thanks!      Jaimee Botello RN    [facility]

## 2023-04-17 ENCOUNTER — MYC MEDICAL ADVICE (OUTPATIENT)
Dept: FAMILY MEDICINE | Facility: CLINIC | Age: 68
End: 2023-04-17
Payer: COMMERCIAL

## 2023-04-17 DIAGNOSIS — B35.1 TOENAIL FUNGUS: ICD-10-CM

## 2023-04-18 RX ORDER — CICLOPIROX 80 MG/ML
SOLUTION TOPICAL
Qty: 6 ML | Refills: 3 | Status: SHIPPED | OUTPATIENT
Start: 2023-04-18 | End: 2023-07-06

## 2023-05-30 ENCOUNTER — TELEPHONE (OUTPATIENT)
Dept: FAMILY MEDICINE | Facility: CLINIC | Age: 68
End: 2023-05-30
Payer: COMMERCIAL

## 2023-05-30 NOTE — TELEPHONE ENCOUNTER
Patient Quality Outreach    Patient is due for the following:   Physical Annual Wellness Visit      Topic Date Due     COVID-19 Vaccine (5 - Moderna series) 04/19/2023       Next Steps:   Schedule Annual wellness visit    Type of outreach:    Sent [employer] message.      Questions for provider review:    None           Rosie Hsu MA

## 2023-07-06 ENCOUNTER — OFFICE VISIT (OUTPATIENT)
Dept: FAMILY MEDICINE | Facility: CLINIC | Age: 68
End: 2023-07-06
Payer: COMMERCIAL

## 2023-07-06 VITALS
BODY MASS INDEX: 25.17 KG/M2 | OXYGEN SATURATION: 99 % | DIASTOLIC BLOOD PRESSURE: 87 MMHG | TEMPERATURE: 97.7 F | HEIGHT: 71 IN | WEIGHT: 179.8 LBS | SYSTOLIC BLOOD PRESSURE: 117 MMHG | RESPIRATION RATE: 12 BRPM | HEART RATE: 74 BPM

## 2023-07-06 DIAGNOSIS — I10 ESSENTIAL HYPERTENSION: ICD-10-CM

## 2023-07-06 DIAGNOSIS — E78.5 HYPERLIPIDEMIA LDL GOAL <130: ICD-10-CM

## 2023-07-06 DIAGNOSIS — Z12.5 SCREENING FOR PROSTATE CANCER: ICD-10-CM

## 2023-07-06 DIAGNOSIS — Z13.1 SCREENING FOR DIABETES MELLITUS: ICD-10-CM

## 2023-07-06 DIAGNOSIS — B35.1 TOENAIL FUNGUS: ICD-10-CM

## 2023-07-06 DIAGNOSIS — L98.9 SKIN LESION: ICD-10-CM

## 2023-07-06 DIAGNOSIS — Z00.00 ANNUAL PHYSICAL EXAM: Primary | ICD-10-CM

## 2023-07-06 DIAGNOSIS — Q85.03 SCHWANNOMATOSIS (H): ICD-10-CM

## 2023-07-06 LAB
ALBUMIN SERPL BCG-MCNC: 4.8 G/DL (ref 3.5–5.2)
ALP SERPL-CCNC: 60 U/L (ref 40–129)
ALT SERPL W P-5'-P-CCNC: 28 U/L (ref 0–70)
ANION GAP SERPL CALCULATED.3IONS-SCNC: 11 MMOL/L (ref 7–15)
AST SERPL W P-5'-P-CCNC: 29 U/L (ref 0–45)
BILIRUB SERPL-MCNC: 0.8 MG/DL
BUN SERPL-MCNC: 15.8 MG/DL (ref 8–23)
CALCIUM SERPL-MCNC: 9.5 MG/DL (ref 8.8–10.2)
CHLORIDE SERPL-SCNC: 98 MMOL/L (ref 98–107)
CHOLEST SERPL-MCNC: 197 MG/DL
CREAT SERPL-MCNC: 1.27 MG/DL (ref 0.67–1.17)
CREAT UR-MCNC: 153 MG/DL
DEPRECATED HCO3 PLAS-SCNC: 28 MMOL/L (ref 22–29)
ERYTHROCYTE [DISTWIDTH] IN BLOOD BY AUTOMATED COUNT: 12.7 % (ref 10–15)
GFR SERPL CREATININE-BSD FRML MDRD: 62 ML/MIN/1.73M2
GLUCOSE SERPL-MCNC: 103 MG/DL (ref 70–99)
HCT VFR BLD AUTO: 46.7 % (ref 40–53)
HDLC SERPL-MCNC: 41 MG/DL
HGB BLD-MCNC: 15.8 G/DL (ref 13.3–17.7)
LDLC SERPL CALC-MCNC: 113 MG/DL
MCH RBC QN AUTO: 30.6 PG (ref 26.5–33)
MCHC RBC AUTO-ENTMCNC: 33.8 G/DL (ref 31.5–36.5)
MCV RBC AUTO: 91 FL (ref 78–100)
MICROALBUMIN UR-MCNC: <12 MG/L
MICROALBUMIN/CREAT UR: NORMAL MG/G{CREAT}
NONHDLC SERPL-MCNC: 156 MG/DL
PLATELET # BLD AUTO: 220 10E3/UL (ref 150–450)
POTASSIUM SERPL-SCNC: 3.9 MMOL/L (ref 3.4–5.3)
PROT SERPL-MCNC: 7.6 G/DL (ref 6.4–8.3)
PSA SERPL DL<=0.01 NG/ML-MCNC: 1.62 NG/ML (ref 0–4.5)
RBC # BLD AUTO: 5.16 10E6/UL (ref 4.4–5.9)
SODIUM SERPL-SCNC: 137 MMOL/L (ref 136–145)
TRIGL SERPL-MCNC: 214 MG/DL
WBC # BLD AUTO: 4.7 10E3/UL (ref 4–11)

## 2023-07-06 PROCEDURE — 80061 LIPID PANEL: CPT | Performed by: FAMILY MEDICINE

## 2023-07-06 PROCEDURE — 85027 COMPLETE CBC AUTOMATED: CPT | Performed by: FAMILY MEDICINE

## 2023-07-06 PROCEDURE — 82043 UR ALBUMIN QUANTITATIVE: CPT | Performed by: FAMILY MEDICINE

## 2023-07-06 PROCEDURE — 80053 COMPREHEN METABOLIC PANEL: CPT | Performed by: FAMILY MEDICINE

## 2023-07-06 PROCEDURE — 82570 ASSAY OF URINE CREATININE: CPT | Performed by: FAMILY MEDICINE

## 2023-07-06 PROCEDURE — 99214 OFFICE O/P EST MOD 30 MIN: CPT | Mod: 25 | Performed by: FAMILY MEDICINE

## 2023-07-06 PROCEDURE — 99397 PER PM REEVAL EST PAT 65+ YR: CPT | Performed by: FAMILY MEDICINE

## 2023-07-06 PROCEDURE — 36415 COLL VENOUS BLD VENIPUNCTURE: CPT | Performed by: FAMILY MEDICINE

## 2023-07-06 PROCEDURE — G0103 PSA SCREENING: HCPCS | Performed by: FAMILY MEDICINE

## 2023-07-06 RX ORDER — CICLOPIROX 80 MG/ML
SOLUTION TOPICAL
Qty: 6 ML | Refills: 3 | Status: SHIPPED | OUTPATIENT
Start: 2023-07-06

## 2023-07-06 RX ORDER — LOSARTAN POTASSIUM AND HYDROCHLOROTHIAZIDE 12.5; 5 MG/1; MG/1
1 TABLET ORAL DAILY
Qty: 90 TABLET | Refills: 0 | Status: CANCELLED | OUTPATIENT
Start: 2023-07-06

## 2023-07-06 ASSESSMENT — ENCOUNTER SYMPTOMS
DIARRHEA: 0
HEMATOCHEZIA: 0
MYALGIAS: 0
NERVOUS/ANXIOUS: 0
PARESTHESIAS: 0
ABDOMINAL PAIN: 0
HEMATURIA: 0
JOINT SWELLING: 0
FREQUENCY: 0
FEVER: 0
COUGH: 0
DYSURIA: 0
PALPITATIONS: 0
DIZZINESS: 0
EYE PAIN: 0
HEADACHES: 0
SHORTNESS OF BREATH: 0
CHILLS: 0
SORE THROAT: 0
WEAKNESS: 0
CONSTIPATION: 0
ARTHRALGIAS: 0
HEARTBURN: 0
NAUSEA: 0

## 2023-07-06 ASSESSMENT — ACTIVITIES OF DAILY LIVING (ADL): CURRENT_FUNCTION: NO ASSISTANCE NEEDED

## 2023-07-06 NOTE — Clinical Note
Please call patient to set up appointment for sometime in next month for lesion removal/biopsy - ok for any 40 min slot.    Thanks,  PSK

## 2023-07-06 NOTE — PATIENT INSTRUCTIONS
Lab today.  Refills will be updated when results return.    Trial off the metoprolol.  If BP>140/90 restart at 12.5 mg daily.    Follow up MRI for Dr. Mittal recommended.   You can call [phone] to schedule this at the [facility] building in [place]     Continue topical treatment for the fungal nail - refills sent.        Patient Education   Personalized Prevention Plan  You are due for the preventive services outlined below.  Your care team is available to assist you in scheduling these services.  If you have already completed any of these items, please share that information with your care team to update in your medical record.  Health Maintenance Due   Topic Date Due    COVID-19 Vaccine (5 - Moderna series) 04/19/2023    Annual Wellness Visit  05/23/2023

## 2023-07-06 NOTE — PROGRESS NOTES
"SUBJECTIVE:   NICOLÁS is a 68 year old who presents for Preventive Visit.      Are you in the first 12 months of your Medicare coverage?  Yes,  Visual Acuity:  Right Eye: 20/25   Left Eye: 20/25  Both Eyes: 20/25  (not on medicare yet)    Healthy Habits:     In general, how would you rate your overall health?  Good    Frequency of exercise:  2-3 days/week    Duration of exercise:  Less than 15 minutes    Do you usually eat at least 4 servings of fruit and vegetables a day, include whole grains    & fiber and avoid regularly eating high fat or \"junk\" foods?  Yes    Taking medications regularly:  Yes    Medication side effects:  None    Ability to successfully perform activities of daily living:  No assistance needed    Home Safety:  No safety concerns identified    Hearing Impairment:  No hearing concerns    In the past 6 months, have you been bothered by leaking of urine?  No    In general, how would you rate your overall mental or emotional health?  Excellent    Additional concerns today:  No    Activity tolerated well.        Hypertension Follow-up  Not taking the metoprolol regular - last took 2 days ago.      Do you check your blood pressure regularly outside of the clinic? Yes     Are you following a low salt diet? Yes    Are your blood pressures ever more than 140 on the top number (systolic) OR more   than 90 on the bottom number (diastolic), for example 140/90? No      Have you ever done Advance Care Planning? (For example, a Health Directive, POLST, or a discussion with a medical provider or your loved ones about your wishes): No, advance care planning information given to patient to review.  Patient declined advance care planning discussion at this time.       Fall risk  Fallen 2 or more times in the past year?: No  Any fall with injury in the past year?: No    Cognitive Screening   1) Repeat 3 items (Leader, Season, Table)    2) Clock draw: NORMAL  3) 3 item recall: Recalls 2 objects   Results: NORMAL " clock, 1-2 items recalled: COGNITIVE IMPAIRMENT LESS LIKELY    Mini-CogTM Copyright KAYLI Mckee. Licensed by the author for use in [facility]; reprinted with permission (sidra@[facility]). All rights reserved.      Do you have sleep apnea, excessive snoring or daytime drowsiness?: no    Reviewed and updated as needed this visit by clinical staff   Tobacco  Allergies  Meds   Med Hx  Surg Hx  Fam Hx          Reviewed and updated as needed this visit by Provider   Tobacco  Allergies  Meds   Med Hx  Surg Hx  Fam Hx         Social History     Tobacco Use     Smoking status: Never     Smokeless tobacco: Never   Substance Use Topics     Alcohol use: No             7/6/2023     8:54 AM   Alcohol Use   Prescreen: >3 drinks/day or >7 drinks/week? No     Do you have a current opioid prescription? No  Do you use any other controlled substances or medications that are not prescribed by a provider? None         Hyperlipidemia Follow-Up      Are you regularly taking any medication or supplement to lower your cholesterol?   No    Are you having muscle aches or other side effects that you think could be caused by your cholesterol lowering medication?  n/a    Schwannomatosis (H)  -has an order from the neurosurgeon, Dr. Mittal, to reevaluate lumbar spine for schwannoma with interval MRI.  He will plan to schedule this.  He denies any pain or other complaint.    Toenail fungus -has been using topical ciclopirox.  Thinks when he uses it consistently he does make headway in treatment of the nails.  Refill is  requested for today    Skin lesion -anterior chest upper abdomen with a irregularly shaped skin lesion.  Has occurred in the last 6 months.            Current providers sharing in care for this patient include:   Patient Care Team:  Jannet Mijares MD as PCP - General (Family Medicine)  Jannet Mijares MD as Assigned PCP    The following health maintenance items are reviewed in Epic and correct as of  today:  Health Maintenance   Topic Date Due     AORTIC ANEURYSM SCREENING (SYSTEM ASSIGNED)  Never done     COVID-19 Vaccine (5 - Moderna series) 04/19/2023     MEDICARE ANNUAL WELLNESS VISIT  05/23/2023     ANNUAL REVIEW OF HM ORDERS  05/23/2023     INFLUENZA VACCINE (1) 09/01/2023     COLORECTAL CANCER SCREENING  01/29/2024     FALL RISK ASSESSMENT  07/06/2024     LIPID  05/23/2027     ADVANCE CARE PLANNING  07/06/2028     DTAP/TDAP/TD IMMUNIZATION (3 - Td or Tdap) 06/28/2031     HEPATITIS C SCREENING  Completed     PHQ-2 (once per calendar year)  Completed     Pneumococcal Vaccine: 65+ Years  Completed     ZOSTER IMMUNIZATION  Completed     IPV IMMUNIZATION  Aged Out     MENINGITIS IMMUNIZATION  Aged Out     BP Readings from Last 3 Encounters:   07/06/23 117/87   12/19/22 137/81   06/15/22 138/80    Wt Readings from Last 3 Encounters:   07/06/23 81.6 kg (179 lb 12.8 oz)   12/19/22 80.8 kg (178 lb 3.2 oz)   01/20/22 82.3 kg (181 lb 6.4 oz)                          Review of Systems   Constitutional: Negative for chills and fever.   HENT: Negative for congestion, ear pain, hearing loss and sore throat.    Eyes: Negative for pain and visual disturbance.   Respiratory: Negative for cough and shortness of breath.    Cardiovascular: Negative for chest pain, palpitations and peripheral edema.   Gastrointestinal: Negative for abdominal pain, constipation, diarrhea, heartburn, hematochezia and nausea.   Genitourinary: Negative for dysuria, frequency, genital sores, hematuria, impotence, penile discharge and urgency.   Musculoskeletal: Negative for arthralgias, joint swelling and myalgias.   Skin: Negative for rash.   Neurological: Negative for dizziness, weakness, headaches and paresthesias.   Psychiatric/Behavioral: Negative for mood changes. The patient is not nervous/anxious.      Skin lesions - anterior chest wall with a lesion present for 6 months or more.  No pain, no bleeding.  ?darker.    OBJECTIVE:   /87  "(BP Location: Right arm, Patient Position: Sitting, Cuff Size: Adult Large)   Pulse 74   Temp 97.7  F (36.5  C) (Oral)   Resp 12   Ht 1.795 m (5' 10.67\")   Wt 81.6 kg (179 lb 12.8 oz)   SpO2 99%   BMI 25.31 kg/m   Estimated body mass index is 25.31 kg/m  as calculated from the following:    Height as of this encounter: 1.795 m (5' 10.67\").    Weight as of this encounter: 81.6 kg (179 lb 12.8 oz).  Physical Exam  GENERAL: healthy, alert and no distress  EYES: Eyes grossly normal to inspection, PERRL and conjunctivae and sclerae normal  HENT: ear canals and TM's normal, nose and mouth without ulcers or lesions  NECK: no adenopathy, no asymmetry, masses, or scars and thyroid normal to palpation  RESP: lungs clear to auscultation - no rales, rhonchi or wheezes  CV: regular rate and rhythm, normal S1 S2, no S3 or S4, no murmur, click or rub, no peripheral edema and peripheral pulses strong  ABDOMEN: soft, nontender, no hepatosplenomegaly, no masses and bowel sounds normal  MS: no gross musculoskeletal defects noted, no edema  SKIN: no suspicious lesions or rashes and scattered SK lesions.  Anterior chest with a 1 cm slightly irregularly shaped lesion with an area of increased pigmentation, no erythema or skin breakdown.   Thickened onychomycotic nails bilaterally feet  NEURO: Normal strength and tone, mentation intact and speech normal  PSYCH: mentation appears normal, affect normal/bright    Diagnostic Test Results:  Labs reviewed in Epic  Results for orders placed or performed in visit on 07/06/23   Albumin Random Urine Quantitative with Creat Ratio     Status: None   Result Value Ref Range    Creatinine Urine mg/dL 153.0 mg/dL    Albumin Urine mg/L <12.0 mg/L    Albumin Urine mg/g Cr     CBC with platelets     Status: Normal   Result Value Ref Range    WBC Count 4.7 4.0 - 11.0 10e3/uL    RBC Count 5.16 4.40 - 5.90 10e6/uL    Hemoglobin 15.8 13.3 - 17.7 g/dL    Hematocrit 46.7 40.0 - 53.0 %    MCV 91 78 - 100 fL "    MCH 30.6 26.5 - 33.0 pg    MCHC 33.8 31.5 - 36.5 g/dL    RDW 12.7 10.0 - 15.0 %    Platelet Count 220 150 - 450 10e3/uL   Lipid panel reflex to direct LDL Fasting     Status: Abnormal   Result Value Ref Range    Cholesterol 197 <200 mg/dL    Triglycerides 214 (H) <150 mg/dL    Direct Measure HDL 41 >=40 mg/dL    LDL Cholesterol Calculated 113 (H) <=100 mg/dL    Non HDL Cholesterol 156 (H) <130 mg/dL    Narrative    Cholesterol  Desirable:  <200 mg/dL    Triglycerides  Normal:  Less than 150 mg/dL  Borderline High:  150-199 mg/dL  High:  200-499 mg/dL  Very High:  Greater than or equal to 500 mg/dL    Direct Measure HDL  Female:  Greater than or equal to 50 mg/dL   Male:  Greater than or equal to 40 mg/dL    LDL Cholesterol  Desirable:  <100mg/dL  Above Desirable:  100-129 mg/dL   Borderline High:  130-159 mg/dL   High:  160-189 mg/dL   Very High:  >= 190 mg/dL    Non HDL Cholesterol  Desirable:  130 mg/dL  Above Desirable:  130-159 mg/dL  Borderline High:  160-189 mg/dL  High:  190-219 mg/dL  Very High:  Greater than or equal to 220 mg/dL   Comprehensive metabolic panel (BMP + Alb, Alk Phos, ALT, AST, Total. Bili, TP)     Status: Abnormal   Result Value Ref Range    Sodium 137 136 - 145 mmol/L    Potassium 3.9 3.4 - 5.3 mmol/L    Chloride 98 98 - 107 mmol/L    Carbon Dioxide (CO2) 28 22 - 29 mmol/L    Anion Gap 11 7 - 15 mmol/L    Urea Nitrogen 15.8 8.0 - 23.0 mg/dL    Creatinine 1.27 (H) 0.67 - 1.17 mg/dL    Calcium 9.5 8.8 - 10.2 mg/dL    Glucose 103 (H) 70 - 99 mg/dL    Alkaline Phosphatase 60 40 - 129 U/L    AST 29 0 - 45 U/L    ALT 28 0 - 70 U/L    Protein Total 7.6 6.4 - 8.3 g/dL    Albumin 4.8 3.5 - 5.2 g/dL    Bilirubin Total 0.8 <=1.2 mg/dL    GFR Estimate 62 >60 mL/min/1.73m2   PSA, screen     Status: Normal   Result Value Ref Range    Prostate Specific Antigen Screen 1.62 0.00 - 4.50 ng/mL    Narrative    This result is obtained using the Roche Elecsys total PSA method on the tino e801 immunoassay  analyzer. Results obtained with different assay methods or kits cannot be used interchangeably.       ASSESSMENT / PLAN:   (Z00.00) Annual physical exam  (primary encounter diagnosis)  Comment: Fasting  Plan: Screening and preventive care discussed.  Planning COVID booster in the fall.    (I10) Essential hypertension  Comment: Blood pressure under good control.  Has only irregularly been taking the metoprolol and at a half dose.  Plan: Albumin Random Urine Quantitative with Creat         Ratio, CBC with platelets, Comprehensive         metabolic panel (BMP + Alb, Alk Phos, ALT, AST,        Total. Bili, TP)        Trial off metoprolol and continuing on losartan hydrochlorothiazide treatment.  We will monitor his blood pressure at home and send me readings.  If elevated blood pressure is noted then I would recommend a consistent use of metoprolol 12.5 daily.    (E78.5) Hyperlipidemia LDL goal <130  Comment: The 10-year ASCVD risk score (Doris VINSON, et al., 2019) is: 15.1%    Values used to calculate the score:      Age: 68 years      Sex: Male      Is Non- : No      Diabetic: No      Tobacco smoker: No      Systolic Blood Pressure: 117 mmHg      Is BP treated: Yes      HDL Cholesterol: 41 mg/dL      Total Cholesterol: 197 mg/dL   Plan: Lipid panel reflex to direct LDL Fasting        Elevated cholesterol with increased in Gilmer risk score greater than 15%.  I again recommended use of cholesterol-lowering medications.  Await response to message.    (Q85.03) Schwannomatosis (H)  Comment: Upper lumbar and lower thoracic spine  Plan: Follow-up MRI are ordered per Dr. Mittal    (B35.1) Toenail fungus  Comment:   Plan: ciclopirox (CICLODAN) 8 % external solution        Topical treatment planned.  Refill given    (L98.9) Skin lesion  Comment: Anterior chest  Plan: Return to clinic planned for shave excision    (Z12.5) Screening for prostate cancer  Comment:   Plan: PSA, screen        Normal  "screening    (Z13.1) Screening for diabetes mellitus  Comment: Borderline elevated blood sugar  Plan: Comprehensive metabolic panel (BMP + Alb, Alk         Phos, ALT, AST, Total. Bili, TP)        Prediabetes noted.  Healthy diet and exercise recommended.  Reassess in 1 year      Patient has been advised of split billing requirements and indicates understanding: Yes      COUNSELING:  Reviewed preventive health counseling, as reflected in patient instructions       Regular exercise       Healthy diet/nutrition       Vision screening       Hearing screening       Fall risk prevention       Immunizations    Declined: Covid-19 due to Other planning in the fall               Prostate cancer screening      BMI:   Estimated body mass index is 25.31 kg/m  as calculated from the following:    Height as of this encounter: 1.795 m (5' 10.67\").    Weight as of this encounter: 81.6 kg (179 lb 12.8 oz).         He reports that he has never smoked. He has never used smokeless tobacco.      Appropriate preventive services were discussed with this patient, including applicable screening as appropriate for cardiovascular disease, diabetes, osteopenia/osteoporosis, and glaucoma.  As appropriate for age/gender, discussed screening for colorectal cancer, prostate cancer, breast cancer, and cervical cancer. Checklist reviewing preventive services available has been given to the patient.    Reviewed patients plan of care and provided an AVS. The Basic Care Plan (routine screening as documented in Health Maintenance) for Shelia meets the Care Plan requirement. This Care Plan has been established and reviewed with the Patient.      Jannet Mijares MD  [facility]    Identified Health Risks:    I have reviewed Opioid Use Disorder and Substance Use Disorder risk factors and made any needed referrals.         Patient Instructions     Lab today.  Refills will be updated when results return.    Trial off the metoprolol.  " If BP>140/90 restart at 12.5 mg daily.    Follow up MRI for Dr. Mittal recommended.   You can call [phone] to schedule this at the [facility] building in [place]     Continue topical treatment for the fungal nail - refills sent.        Patient Education  Personalized Prevention Plan  You are due for the preventive services outlined below.  Your care team is available to assist you in scheduling these services.  If you have already completed any of these items, please share that information with your care team to update in your medical record.  Health Maintenance Due   Topic Date Due     COVID-19 Vaccine (5 - Moderna series) 04/19/2023     Annual Wellness Visit  05/23/2023

## 2023-07-06 NOTE — Clinical Note
Please call patient: Assist with scheduling follow-up appointment with me for 40 minutes for lesion removal.  Any available slot is okay.  DEREK

## 2023-07-07 ENCOUNTER — MYC MEDICAL ADVICE (OUTPATIENT)
Dept: FAMILY MEDICINE | Facility: CLINIC | Age: 68
End: 2023-07-07
Payer: COMMERCIAL

## 2023-07-07 DIAGNOSIS — I10 ESSENTIAL HYPERTENSION: ICD-10-CM

## 2023-07-07 RX ORDER — LOSARTAN POTASSIUM AND HYDROCHLOROTHIAZIDE 12.5; 5 MG/1; MG/1
TABLET ORAL
Qty: 90 TABLET | Refills: 1 | Status: SHIPPED | OUTPATIENT
Start: 2023-07-07 | End: 2024-01-02

## 2023-07-07 NOTE — RESULT ENCOUNTER NOTE
"The 10-year ASCVD risk score (Doris VINSON, et al., 2019) is: 15.1%    Values used to calculate the score:      Age: 68 years      Sex: Male      Is Non- : No      Diabetic: No      Tobacco smoker: No      Systolic Blood Pressure: 117 mmHg      Is BP treated: Yes      HDL Cholesterol: 41 mg/dL      Total Cholesterol: 197 mg/dL   Your cholesterol levels are higher than previous.  When considering these results and other risk factor assessment, your overall risk for heart disease is in the level that I would recommend cholesterol-lowering medication to manage.  Please let me know your thoughts on this.  Your blood sugar is borderline elevated.  This is in the \"prediabetic\" range.  Exercise and limiting carbohydrate and sugars in diet can be helpful at preventing progression to diabetes.   Your kidney function and liver testing are normal.  Urine testing is normal.  There is not elevated protein present.  Prostate cancer screening test is normal.  Your blood cell counts are normal.  Please call or MyChart message me if you have any questions.      Sincerely,         Jannet Mijares MD     "

## 2023-07-24 ENCOUNTER — OFFICE VISIT (OUTPATIENT)
Dept: FAMILY MEDICINE | Facility: CLINIC | Age: 68
End: 2023-07-24
Payer: COMMERCIAL

## 2023-07-24 VITALS
HEIGHT: 70 IN | TEMPERATURE: 98.3 F | BODY MASS INDEX: 24.98 KG/M2 | OXYGEN SATURATION: 96 % | SYSTOLIC BLOOD PRESSURE: 119 MMHG | DIASTOLIC BLOOD PRESSURE: 87 MMHG | HEART RATE: 72 BPM | RESPIRATION RATE: 16 BRPM | WEIGHT: 174.5 LBS

## 2023-07-24 DIAGNOSIS — L98.9 SKIN LESION: Primary | ICD-10-CM

## 2023-07-24 PROCEDURE — 88305 TISSUE EXAM BY PATHOLOGIST: CPT | Performed by: PATHOLOGY

## 2023-07-24 PROCEDURE — 99207 PR DROP WITH A PROCEDURE: CPT | Performed by: FAMILY MEDICINE

## 2023-07-24 PROCEDURE — 11301 SHAVE SKIN LESION 0.6-1.0 CM: CPT | Performed by: FAMILY MEDICINE

## 2023-07-24 ASSESSMENT — PAIN SCALES - GENERAL: PAINLEVEL: NO PAIN (0)

## 2023-07-24 NOTE — PROGRESS NOTES
Procedure: Shave {DERM SHAVE/BIOPSY:363567} x {NUMBERS 0-6:151751}  The patient was informed of the nature of the procedure and was informed that the procedure will heal with a scar. He  was also informed of the risk of unattractive scarring, recurrence of the lesion, infection, bleeding and pain and that further treatment may be needed. He  consents to the procedure.  The area surrounding the lesion(s) {WAS/WERE:129541}  prepped with alcohol. Local anesthesia (1% xylocaine with epinephrine) was injected. The lesion(s) {WAS/WERE:134867}  biopsied with a blue blade. The specimen was sent to Pathology. {DERM HEMOSTASIS:346593}was used for hemostasis. The wound(s) {WAS/WERE:661708}  dressed with bacitracin ointment and a bandage. Wound care instructions were explained and a wound care sheet was given. Follow up with any wound problems, poor healing or infection.     The following lesions were removed/biopsied:  A) Location: *** Clinical Diagnosis: ***  B) Location: *** Clinical Diagnosis: ***  C) Location: *** Clinical Diagnosis: ***  D) Location: *** Clinical Diagnosis: ***

## 2023-07-24 NOTE — PATIENT INSTRUCTIONS
Apply antibiotic ointment to the area on the chest twice a day until healed.    Ok to shower normally.   Do not scrub over the area and avoid submerging in lake water until healed.

## 2023-07-24 NOTE — PROGRESS NOTES
"  Assessment & Plan     Skin lesion  Probable SK but variation of color and shape prompts shave excisional biopsy.    - trunk, arms, legs  - Surgical Pathology Exam             Patient Instructions   Apply antibiotic ointment to the area on the chest twice a day until healed.    Ok to shower normally.   Do not scrub over the area and avoid submerging in lake water until healed.    Jannet Mijares MD  [facility] BARBARA MONZON is a 68 year old, presenting for the following health issues:  Lesion Removal (On chest)      7/24/2023    11:02 AM   Additional Questions   Roomed by Diane Lynne Schoenherr RN     History of Present Illness       Reason for visit:  Skin spot    He eats 4 or more servings of fruits and vegetables daily.He consumes 1 sweetened beverage(s) daily.He exercises with enough effort to increase his heart rate 20 to 29 minutes per day.  He exercises with enough effort to increase his heart rate 6 days per week.   He is taking medications regularly.       Skin Lesion  Onset/Duration 1 year ago  Description  Location: chest  Color: brown  Border description: flat  Character: square shape  Itching: no  Bleeding:  No  Intensity:  no problem  Progression of Symptoms:  same  Accompanying signs and symptoms:   Bleeding: No  Scaling: No  Excessive sun exposure/tanning: No  Sunscreen used: No  History:           Any previous history of skin cancer: No  Any family history of melanoma: No  Previous episodes of similar lesion: No  Precipitating or alleviating factors: nothing  Therapies tried and outcome: none        Review of Systems   Constitutional, HEENT, cardiovascular, pulmonary, gi and gu systems are negative, except as otherwise noted.      Objective    BP (!) 128/93 (BP Location: Right arm, Patient Position: Sitting, Cuff Size: Adult Large)   Pulse 72   Temp 98.3  F (36.8  C) (Oral)   Resp 16   Ht 1.778 m (5' 10\")   Wt 79.2 kg (174 lb 8 oz)   SpO2 96%   BMI 25.04 " kg/m    Body mass index is 25.04 kg/m .  Physical Exam   Skin anterior chest  1 cm irregularly shaped multicolored lesion - SK like in appearance.            .  Procedure: Shave Biopsy x 1  The patient was informed of the nature of the procedure and was informed that the procedure will heal with a scar. He  was also informed of the risk of unattractive scarring, recurrence of the lesion, infection, bleeding and pain and that further treatment may be needed. He  consents to the procedure.  The area surrounding the lesion(s) was  prepped with alcohol. Local anesthesia (1% xylocaine with epinephrine) was injected. The lesion(s) was  biopsied with a 15 blade - removed in piecemeal fashion, 7 mm size.  The specimen was sent to Pathology. Electrodessication was used for hemostasis. The wound(s) was  dressed with bacitracin ointment and a bandage. Wound care instructions were explained and a wound care sheet was given. Follow up with any wound problems, poor healing or infection.      The following lesions were removed/biopsied:  A) Location: anterior  chest Clinical Diagnosis: SK

## 2023-07-26 LAB
PATH REPORT.COMMENTS IMP SPEC: NORMAL
PATH REPORT.COMMENTS IMP SPEC: NORMAL
PATH REPORT.FINAL DX SPEC: NORMAL
PATH REPORT.GROSS SPEC: NORMAL
PATH REPORT.MICROSCOPIC SPEC OTHER STN: NORMAL
PATH REPORT.RELEVANT HX SPEC: NORMAL
PHOTO IMAGE: NORMAL

## 2023-08-03 ENCOUNTER — ANCILLARY PROCEDURE (OUTPATIENT)
Dept: MRI IMAGING | Facility: CLINIC | Age: 68
End: 2023-08-03
Attending: NEUROLOGICAL SURGERY
Payer: COMMERCIAL

## 2023-08-03 DIAGNOSIS — Q85.03 SCHWANNOMATOSIS (H): ICD-10-CM

## 2023-08-03 PROCEDURE — 72158 MRI LUMBAR SPINE W/O & W/DYE: CPT | Performed by: STUDENT IN AN ORGANIZED HEALTH CARE EDUCATION/TRAINING PROGRAM

## 2023-08-03 PROCEDURE — A9585 GADOBUTROL INJECTION: HCPCS | Mod: JZ | Performed by: STUDENT IN AN ORGANIZED HEALTH CARE EDUCATION/TRAINING PROGRAM

## 2023-08-03 RX ORDER — GADOBUTROL 604.72 MG/ML
10 INJECTION INTRAVENOUS ONCE
Status: COMPLETED | OUTPATIENT
Start: 2023-08-03 | End: 2023-08-03

## 2023-08-03 RX ADMIN — GADOBUTROL 8 ML: 604.72 INJECTION INTRAVENOUS at 13:16

## 2023-08-04 ENCOUNTER — TELEPHONE (OUTPATIENT)
Dept: NEUROSURGERY | Facility: CLINIC | Age: 68
End: 2023-08-04
Payer: COMMERCIAL

## 2023-08-04 NOTE — TELEPHONE ENCOUNTER
MRI reviewed by Dr Mittal. Jewel. Recommendation to repeat in 2 years.     Attempted to reach out to patient, no answer. Left voice message for them to call clinic back to further discuss.

## 2023-08-14 ENCOUNTER — TELEPHONE (OUTPATIENT)
Dept: NEUROSURGERY | Facility: CLINIC | Age: 68
End: 2023-08-14
Payer: COMMERCIAL

## 2023-08-14 NOTE — TELEPHONE ENCOUNTER
MRI Lumbar stable, to repeat in 2 years as per Dr Mittal.   Message sent to patient.     Message sent to pool to order and schedule

## 2023-10-12 ENCOUNTER — MYC MEDICAL ADVICE (OUTPATIENT)
Dept: FAMILY MEDICINE | Facility: CLINIC | Age: 68
End: 2023-10-12
Payer: COMMERCIAL

## 2023-10-12 NOTE — TELEPHONE ENCOUNTER
Please advise if visit appropriate for this request or other instructions.    DESMOND Darling  [facility] Primary Care Triage

## 2023-10-12 NOTE — LETTER
October 12, 2023      Dinhmalorieritesh Ortiz  [address]  [place] [address]          To Whom It May Concern:    Shelia Ortiz, 1955, is taking Fish oil as medically recommended for reduction of cholesterol.      Sincerely,         Jannet Mijares MD

## 2023-12-30 DIAGNOSIS — I10 ESSENTIAL HYPERTENSION: ICD-10-CM

## 2024-01-02 RX ORDER — LOSARTAN POTASSIUM AND HYDROCHLOROTHIAZIDE 12.5; 5 MG/1; MG/1
1 TABLET ORAL DAILY
Qty: 30 TABLET | Refills: 0 | Status: SHIPPED | OUTPATIENT
Start: 2024-01-02 | End: 2024-01-18

## 2024-01-18 ENCOUNTER — MYC REFILL (OUTPATIENT)
Dept: FAMILY MEDICINE | Facility: CLINIC | Age: 69
End: 2024-01-18
Payer: COMMERCIAL

## 2024-01-18 DIAGNOSIS — I10 ESSENTIAL HYPERTENSION: ICD-10-CM

## 2024-01-19 RX ORDER — LOSARTAN POTASSIUM AND HYDROCHLOROTHIAZIDE 12.5; 5 MG/1; MG/1
1 TABLET ORAL DAILY
Qty: 90 TABLET | Refills: 0 | Status: SHIPPED | OUTPATIENT
Start: 2024-01-19 | End: 2024-04-15

## 2024-02-12 NOTE — PLAN OF CARE
Discharge Planner OT   Patient plan for discharge: Home with assist from wife and daughter  Current status: Orders received, eval completed, treatment initiated and completed, orders completed. 64 year old male with PMH HTN, CNS tumor who was admitted on 10/2/2019 with acute on chronic back pain. Underwent L1-L2 laminectomy and resection of intradural mass on 10/4/2019. Pt lives in house with stairs, step in shower, and raised toilet seat with wife and 19 yo daughter.     Pt completed bed mobility with MIN A VC and additional ed required. Pt able to recall 1/3 spinal precautions, ed provided. Pt transferred and ambulated with SB-CGA and VC for walker use. Toilet transfer with SBA and VC with use of bar. Ed for dressing within precautions, IADL modifications, and fall prevention provided.  Barriers to return to prior living situation: stairs (defer to PT)  Recommendations for discharge: Home with assist from wife for ADLs as needed and IADLs. Recommend a reacher and additional mirrors for car as pt reports discomfort with driving.   Rationale for recommendations: Pt is below baseline for ADLs/IADLs, is progressing and demonstrates ability to follow recommendations and precautions. Pt reports a high level of assist available from family. No further OT needs identified.        Entered by: Mandy Cuello 10/05/2019 2:36 PM        0 = swallows foods/liquids without difficulty

## 2024-04-15 ENCOUNTER — OFFICE VISIT (OUTPATIENT)
Dept: INTERNAL MEDICINE | Facility: CLINIC | Age: 69
End: 2024-04-15
Payer: COMMERCIAL

## 2024-04-15 VITALS
TEMPERATURE: 98.6 F | BODY MASS INDEX: 24.91 KG/M2 | DIASTOLIC BLOOD PRESSURE: 85 MMHG | SYSTOLIC BLOOD PRESSURE: 131 MMHG | RESPIRATION RATE: 16 BRPM | WEIGHT: 174 LBS | HEART RATE: 66 BPM | HEIGHT: 70 IN | OXYGEN SATURATION: 99 %

## 2024-04-15 DIAGNOSIS — Z29.11 NEED FOR VACCINATION AGAINST RESPIRATORY SYNCYTIAL VIRUS: ICD-10-CM

## 2024-04-15 DIAGNOSIS — I10 ESSENTIAL HYPERTENSION: ICD-10-CM

## 2024-04-15 DIAGNOSIS — M10.9 ACUTE GOUT INVOLVING TOE OF LEFT FOOT, UNSPECIFIED CAUSE: ICD-10-CM

## 2024-04-15 DIAGNOSIS — Z12.11 SCREEN FOR COLON CANCER: Primary | ICD-10-CM

## 2024-04-15 LAB — URATE SERPL-MCNC: 7.8 MG/DL (ref 3.4–7)

## 2024-04-15 PROCEDURE — 99204 OFFICE O/P NEW MOD 45 MIN: CPT

## 2024-04-15 PROCEDURE — 84550 ASSAY OF BLOOD/URIC ACID: CPT

## 2024-04-15 PROCEDURE — 36415 COLL VENOUS BLD VENIPUNCTURE: CPT

## 2024-04-15 RX ORDER — RESPIRATORY SYNCYTIAL VIRUS VACCINE 120MCG/0.5
0.5 KIT INTRAMUSCULAR ONCE
Qty: 1 EACH | Refills: 0 | Status: CANCELLED | OUTPATIENT
Start: 2024-04-15 | End: 2024-04-15

## 2024-04-15 RX ORDER — LOSARTAN POTASSIUM AND HYDROCHLOROTHIAZIDE 12.5; 5 MG/1; MG/1
1 TABLET ORAL DAILY
Qty: 90 TABLET | Refills: 2 | Status: SHIPPED | OUTPATIENT
Start: 2024-04-15 | End: 2024-05-20 | Stop reason: ALTCHOICE

## 2024-04-15 RX ORDER — PREDNISONE 20 MG/1
20 TABLET ORAL DAILY
Qty: 5 TABLET | Refills: 0 | Status: SHIPPED | OUTPATIENT
Start: 2024-04-15 | End: 2024-05-03

## 2024-04-15 RX ORDER — COLCHICINE 0.6 MG/1
TABLET ORAL
Qty: 4 TABLET | Refills: 0 | Status: SHIPPED | OUTPATIENT
Start: 2024-04-15 | End: 2024-05-03

## 2024-04-15 NOTE — PROGRESS NOTES
"  Assessment & Plan   M10.9) Acute gout involving toe of left foot, unspecified cause (primary encounter diagnosis)  Comment: Patient seen for gout flare. Has been able to manage with Ibuprofen with past flares, but this one is slightly more painful and has been ongoing for several days. Discussed treatment options for symptoms management. Discussed checking Uric acid lab this visit.   Plan: predniSONE (DELTASONE) 20 MG tablet, colchicine        (COLCRYS) 0.6 MG tablet,         Prescription sent to pharmacy    Uric acid  Pending     (Z12.11) Screen for colon cancer  (primary encounter diagnosis)  Comment: Discussed screening   Plan: Colonoscopy Screening  Referral        Future     (Z29.11) Need for vaccination against respiratory syncytial virus  Comment: Discussed need for vaccine.  Plan: Patient will do in future declined today.    (  (I10) Essential hypertension  Comment: Chronic, stable. Reviewed patient at home blood pressure readings and noted that he is very stable. Patient requested refills.   Plan: losartan-hydrochlorothiazide (HYZAAR) 50-12.5         [facility] tablet        Prescriptions sent to pharmacy         Dakota MONZON is a 68 year old, presenting for the following health issues:  Arthritis (Gout )        4/15/2024     7:02 AM   Additional Questions   Roomed by Lavinia SEXTON         Pain in his right foot- thinks it might be gout         Review of Systems  Constitutional, HEENT, cardiovascular, pulmonary, gi and gu systems are negative, except as otherwise noted.      Objective    /85 (BP Location: Left arm, Patient Position: Sitting, Cuff Size: Adult Regular)   Pulse 66   Temp 98.6  F (37  C) (Temporal)   Resp 16   Ht 1.778 m (5' 10\")   Wt 78.9 kg (174 lb)   SpO2 99%   BMI 24.97 kg/m    Body mass index is 24.97 kg/m .  Physical Exam  Constitutional:       Appearance: Normal appearance.   HENT:      Head: Normocephalic.      Nose: Nose normal.      Mouth/Throat:      Mouth: Mucous " membranes are moist.   Eyes:      General:         Right eye: No discharge.         Left eye: No discharge.      Pupils: Pupils are equal, round, and reactive to light.   Pulmonary:      Effort: Pulmonary effort is normal. No respiratory distress.      Breath sounds: Normal breath sounds. No stridor. No wheezing or rhonchi.   Musculoskeletal:         General: Normal range of motion.        Feet:    Feet:      Right foot:      Skin integrity: Erythema and warmth present.      Toenail Condition: Right toenails are normal.      Left foot:      Skin integrity: Skin integrity normal.      Toenail Condition: Left toenails are normal.      Comments: Inflammation noted at base of great toe.  Skin:     General: Skin is warm.      Findings: Erythema present.      Comments: Inflamed great toe on right foot at base.    Neurological:      General: No focal deficit present.      Mental Status: He is alert and oriented to person, place, and time.   Psychiatric:         Mood and Affect: Mood normal.         Behavior: Behavior normal.         Thought Content: Thought content normal.         Judgment: Judgment normal.            No results found for any visits on 04/15/24.        Signed Electronically by: IMMANUEL Goldberg CNP  Answers submitted by the patient for this visit:  General Questionnaire (Submitted on 4/15/2024)  Chief Complaint: Chronic problems general questions HPI Form  How many servings of fruits and vegetables do you eat daily?: 2-3  On average, how many sweetened beverages do you drink each day (Examples: soda, juice, sweet tea, etc.  Do NOT count diet or artificially sweetened beverages)?: 1  How many minutes a day do you exercise enough to make your heart beat faster?: 20 to 29  How many days a week do you exercise enough to make your heart beat faster?: 4  How many days per week do you miss taking your medication?: 0  General Concern (Submitted on 4/15/2024)  Chief Complaint: Chronic problems general  questions HPI Form  What is the reason for your visit today?: foot pain  When did your symptoms begin?: 3-4 weeks ago  How would you describe these symptoms?: Moderate  Are your symptoms:: Staying the same  Have you had these symptoms before?: No  Is there anything that makes you feel worse?: walking

## 2024-04-16 ENCOUNTER — MYC MEDICAL ADVICE (OUTPATIENT)
Dept: INTERNAL MEDICINE | Facility: CLINIC | Age: 69
End: 2024-04-16
Payer: COMMERCIAL

## 2024-04-30 ENCOUNTER — MYC MEDICAL ADVICE (OUTPATIENT)
Dept: INTERNAL MEDICINE | Facility: CLINIC | Age: 69
End: 2024-04-30
Payer: COMMERCIAL

## 2024-04-30 DIAGNOSIS — M10.9 ACUTE GOUT INVOLVING TOE OF LEFT FOOT, UNSPECIFIED CAUSE: ICD-10-CM

## 2024-05-03 DIAGNOSIS — M10.9 ACUTE GOUT INVOLVING TOE OF LEFT FOOT, UNSPECIFIED CAUSE: ICD-10-CM

## 2024-05-03 DIAGNOSIS — I10 ESSENTIAL HYPERTENSION: ICD-10-CM

## 2024-05-03 RX ORDER — PREDNISONE 20 MG/1
20 TABLET ORAL DAILY
Qty: 5 TABLET | Refills: 0 | Status: CANCELLED | OUTPATIENT
Start: 2024-05-03

## 2024-05-03 RX ORDER — PREDNISONE 20 MG/1
20 TABLET ORAL DAILY
Qty: 5 TABLET | Refills: 0 | Status: SHIPPED | OUTPATIENT
Start: 2024-05-03 | End: 2024-07-25

## 2024-05-03 RX ORDER — COLCHICINE 0.6 MG/1
TABLET ORAL
Qty: 4 TABLET | Refills: 0 | Status: SHIPPED | OUTPATIENT
Start: 2024-05-03 | End: 2024-07-25

## 2024-05-11 ENCOUNTER — MYC MEDICAL ADVICE (OUTPATIENT)
Dept: FAMILY MEDICINE | Facility: CLINIC | Age: 69
End: 2024-05-11
Payer: COMMERCIAL

## 2024-05-13 ENCOUNTER — E-VISIT (OUTPATIENT)
Dept: FAMILY MEDICINE | Facility: CLINIC | Age: 69
End: 2024-05-13
Payer: COMMERCIAL

## 2024-05-13 DIAGNOSIS — M10.9 ACUTE GOUT, UNSPECIFIED CAUSE, UNSPECIFIED SITE: Primary | ICD-10-CM

## 2024-05-13 PROCEDURE — 99207 PR NON-BILLABLE SERV PER CHARTING: CPT | Performed by: FAMILY MEDICINE

## 2024-05-13 NOTE — TELEPHONE ENCOUNTER
Provider E-Visit time total (minutes): 4 min  - scheduled in person visit.    Gout episode - elevated uric acid during acute attack.   Recheck uric acid, change off of hydrochlorothiazide, check kidney function.

## 2024-05-13 NOTE — PATIENT INSTRUCTIONS
Thank you for choosing us for your care. I think an in-clinic visit would be best next steps based on your symptoms. Please schedule a clinic appointment; you won t be charged for this eVisit.      You can schedule an appointment right here in [facility], or call [phone]

## 2024-05-13 NOTE — TELEPHONE ENCOUNTER
Routing to  to help patient schedule visit with Dr. Mijares to discuss gout symptoms further. This was requested from nory today with PCP.     Mandy Tripathi, BLAIRN, RN   [facility]

## 2024-05-16 ENCOUNTER — OFFICE VISIT (OUTPATIENT)
Dept: FAMILY MEDICINE | Facility: CLINIC | Age: 69
End: 2024-05-16
Payer: COMMERCIAL

## 2024-05-16 VITALS
WEIGHT: 170.7 LBS | HEART RATE: 76 BPM | SYSTOLIC BLOOD PRESSURE: 119 MMHG | HEIGHT: 70 IN | RESPIRATION RATE: 22 BRPM | DIASTOLIC BLOOD PRESSURE: 84 MMHG | BODY MASS INDEX: 24.44 KG/M2 | TEMPERATURE: 98.2 F | OXYGEN SATURATION: 100 %

## 2024-05-16 DIAGNOSIS — M10.279 DRUG-INDUCED GOUT INVOLVING TOE, UNSPECIFIED CHRONICITY, UNSPECIFIED LATERALITY: Primary | ICD-10-CM

## 2024-05-16 DIAGNOSIS — I10 ESSENTIAL HYPERTENSION: ICD-10-CM

## 2024-05-16 PROCEDURE — 84550 ASSAY OF BLOOD/URIC ACID: CPT | Performed by: FAMILY MEDICINE

## 2024-05-16 PROCEDURE — 36415 COLL VENOUS BLD VENIPUNCTURE: CPT | Performed by: FAMILY MEDICINE

## 2024-05-16 PROCEDURE — 80053 COMPREHEN METABOLIC PANEL: CPT | Performed by: FAMILY MEDICINE

## 2024-05-16 PROCEDURE — G2211 COMPLEX E/M VISIT ADD ON: HCPCS | Performed by: FAMILY MEDICINE

## 2024-05-16 PROCEDURE — 99214 OFFICE O/P EST MOD 30 MIN: CPT | Performed by: FAMILY MEDICINE

## 2024-05-16 RX ORDER — LOSARTAN POTASSIUM 50 MG/1
50 TABLET ORAL DAILY
Qty: 90 TABLET | Refills: 0 | Status: SHIPPED | OUTPATIENT
Start: 2024-05-16 | End: 2024-07-24

## 2024-05-16 RX ORDER — RESPIRATORY SYNCYTIAL VIRUS VACCINE 120MCG/0.5
0.5 KIT INTRAMUSCULAR ONCE
Qty: 1 EACH | Refills: 0 | Status: CANCELLED | OUTPATIENT
Start: 2024-05-16 | End: 2024-05-16

## 2024-05-16 ASSESSMENT — PAIN SCALES - GENERAL: PAINLEVEL: NO PAIN (0)

## 2024-05-16 NOTE — PATIENT INSTRUCTIONS
Change the BP medication to Losartan without the hydrochlorothiazide.  I have sent a script for this.    Monitor the BP over the next 2-3 weeks and send me readings.  If >135/90, notify me sooner and additional recommendations will be given.      Labs today.  Once I see the current uric acid level with your recent dietary changes, I will make recommendations for medication to lower the uric acid (allopurinol).

## 2024-05-17 ENCOUNTER — MYC MEDICAL ADVICE (OUTPATIENT)
Dept: FAMILY MEDICINE | Facility: CLINIC | Age: 69
End: 2024-05-17
Payer: COMMERCIAL

## 2024-05-17 LAB
ALBUMIN SERPL BCG-MCNC: 4.5 G/DL (ref 3.5–5.2)
ALP SERPL-CCNC: 66 U/L (ref 40–150)
ALT SERPL W P-5'-P-CCNC: 24 U/L (ref 0–70)
ANION GAP SERPL CALCULATED.3IONS-SCNC: 10 MMOL/L (ref 7–15)
AST SERPL W P-5'-P-CCNC: 23 U/L (ref 0–45)
BILIRUB SERPL-MCNC: 0.4 MG/DL
BUN SERPL-MCNC: 17.7 MG/DL (ref 8–23)
CALCIUM SERPL-MCNC: 9.5 MG/DL (ref 8.8–10.2)
CHLORIDE SERPL-SCNC: 98 MMOL/L (ref 98–107)
CREAT SERPL-MCNC: 1.22 MG/DL (ref 0.67–1.17)
DEPRECATED HCO3 PLAS-SCNC: 28 MMOL/L (ref 22–29)
EGFRCR SERPLBLD CKD-EPI 2021: 65 ML/MIN/1.73M2
GLUCOSE SERPL-MCNC: 134 MG/DL (ref 70–99)
POTASSIUM SERPL-SCNC: 3.7 MMOL/L (ref 3.4–5.3)
PROT SERPL-MCNC: 6.8 G/DL (ref 6.4–8.3)
SODIUM SERPL-SCNC: 136 MMOL/L (ref 135–145)
URATE SERPL-MCNC: 7.1 MG/DL (ref 3.4–7)

## 2024-05-19 NOTE — RESULT ENCOUNTER NOTE
Your uric acid level is near normal now.  I would recommend we make the change in the BP medication and recheck the uric acid level at your upcoming appointment in July.   IF you have recurrent gout symptoms prior to that, follow up sooner is recommended.  You can expand your eating habits without overdoing the foods that are potential triggers for gout that you have been avoiding.    Please call or MyChart message me if you have any questions.      DEREK

## 2024-05-20 ENCOUNTER — TELEPHONE (OUTPATIENT)
Dept: GASTROENTEROLOGY | Facility: CLINIC | Age: 69
End: 2024-05-20
Payer: COMMERCIAL

## 2024-05-20 NOTE — TELEPHONE ENCOUNTER
"Endoscopy Scheduling Screen    Have you had a positive Covid test in the last 14 days?  No    What is your communication preference for Instructions and/or Bowel Prep?   MyChart    What insurance is in the chart?  Other:  [facility]/medicare     Ordering/Referring Provider:     JD CROCKETT       (If ordering provider performs procedure, schedule with ordering provider unless otherwise instructed. )    BMI: Estimated body mass index is 24.49 kg/m  as calculated from the following:    Height as of 5/16/24: 1.778 m (5' 10\").    Weight as of 5/16/24: 77.4 kg (170 lb 11.2 oz).     Sedation Ordered  moderate sedation.   If patient BMI > 50 do not schedule in [facility].    If patient BMI > 45 do not schedule at [facility].    Are you taking methadone or Suboxone?  No    Have you had difficulties, pain, or discomfort during past endoscopy procedures?  No    Are you taking any prescription medications for pain 3 or more times per week?   NO, No RN review required.    Do you have a history of malignant hyperthermia?  No    (Females) Are you currently pregnant?   No     Have you been diagnosed or told you have pulmonary hypertension?   No    Do you have an LVAD?  No    Have you been told you have moderate to severe sleep apnea?  No    Have you been told you have COPD, asthma, or any other lung disease?  No    Do you have any heart conditions?  No     Have you ever had or are you waiting for an organ transplant?  No. Continue scheduling, no site restrictions.    Have you had a stroke or transient ischemic attack (TIA aka \"mini stroke\" in the last 6 months?   No    Have you been diagnosed with or been told you have cirrhosis of the liver?   No    Are you currently on dialysis?   No    Do you need assistance transferring?   No    BMI: Estimated body mass index is 24.49 kg/m  as calculated from the following:    Height as of 5/16/24: 1.778 m (5' 10\").    Weight as of 5/16/24: 77.4 kg (170 lb 11.2 oz).     Is patients BMI > 40 and " scheduling location [facility]?  No    Do you take an injectable medication for weight loss or diabetes (excluding insulin)?  No    Do you take the medication Naltrexone?  No    Do you take blood thinners?  No       Prep   Are you currently on dialysis or do you have chronic kidney disease?  No    Do you have a diagnosis of diabetes?  No    Do you have a diagnosis of cystic fibrosis (CF)?  No    On a regular basis do you go 3 -5 days between bowel movements?  No    BMI > 40?  No    Preferred Pharmacy:    [facility]/pharmacy #[id number] [facility] [address] [facility]  [address] [facility] [address]  Phone: [phone] Fax: [phone]      Final Scheduling Details     Procedure scheduled  Colonoscopy    Surgeon:  Randi      Date of procedure:  07/03/2024     Pre-OP / PAC:   No - Not required for this site.    Location  [facility] - [facility] - Per order.    Sedation   Moderate Sedation - Per order.      Patient Reminders:   You will receive a call from a Nurse to review instructions and health history.  This assessment must be completed prior to your procedure.  Failure to complete the Nurse assessment may result in the procedure being cancelled.      On the day of your procedure, please designate an adult(s) who can drive you home stay with you for the next 24 hours. The medicines used in the exam will make you sleepy. You will not be able to drive.      You cannot take public transportation, ride share services, or non-medical taxi service without a responsible caregiver.  Medical transport services are allowed with the requirement that a responsible caregiver will receive you at your destination.  We require that drivers and caregivers are confirmed prior to your procedure.

## 2024-05-20 NOTE — TELEPHONE ENCOUNTER
Noted patient saw interpretation note on 5/19/24.    Routing to provider to review and advise further if appropriate.     DESMOND Mayes  [facility]

## 2024-05-31 ENCOUNTER — MYC MEDICAL ADVICE (OUTPATIENT)
Dept: FAMILY MEDICINE | Facility: CLINIC | Age: 69
End: 2024-05-31
Payer: COMMERCIAL

## 2024-06-26 ENCOUNTER — TELEPHONE (OUTPATIENT)
Dept: GASTROENTEROLOGY | Facility: CLINIC | Age: 69
End: 2024-06-26
Payer: COMMERCIAL

## 2024-06-26 DIAGNOSIS — Z12.11 SPECIAL SCREENING FOR MALIGNANT NEOPLASMS, COLON: Primary | ICD-10-CM

## 2024-06-26 RX ORDER — POLYETHYLENE GLYCOL 3350 17 G/17G
POWDER, FOR SOLUTION ORAL
Qty: 238 G | Refills: 0 | Status: SHIPPED | OUTPATIENT
Start: 2024-06-26 | End: 2024-07-03

## 2024-06-26 RX ORDER — BISACODYL 5 MG/1
TABLET, DELAYED RELEASE ORAL
Qty: 4 TABLET | Refills: 0 | Status: SHIPPED | OUTPATIENT
Start: 2024-06-26 | End: 2024-07-03

## 2024-06-26 RX ORDER — MAGNESIUM CARB/ALUMINUM HYDROX 105-160MG
296 TABLET,CHEWABLE ORAL ONCE
Qty: 296 ML | Refills: 0 | Status: SHIPPED | OUTPATIENT
Start: 2024-06-26 | End: 2024-06-26

## 2024-06-26 NOTE — TELEPHONE ENCOUNTER
Pre visit planning completed.      Procedure details:    Patient scheduled for Colonoscopy  on 7/3/24.     Arrival time: 0730. Procedure time 0815    Facility location: [facility] [place]; [address], [place] [address]. Check in location: 2nd Floor at Surgery desk.    Sedation type: Conscious sedation  --  previous colonoscopies with CS, tolerated well    Pre op exam needed? N/A    Indication for procedure: Screen for colon cancer       Chart review:     Electronic implanted devices? No    Recent diagnosis of diverticulitis within the last 6 weeks? No    Diabetic? No      Medication review:    Anticoagulants? No    NSAIDS? No NSAID medications per patient's medication list.  RN will verify with pre-assessment call.    Other medication HOLDING recommendations:  N/A      Prep for procedure:     Bowel prep recommendation: Standard Miralax  Due to: standard bowel prep.    Prep instructions sent via [employer]         Corinne Kliber, RN  Endoscopy Procedure Pre Assessment RN  [phone] option 4  2

## 2024-06-26 NOTE — TELEPHONE ENCOUNTER
Pre assessment completed for upcoming procedure.   (Please see previous telephone encounter notes for complete details)    Procedure details:    Arrival time and facility location reviewed.    Pre op exam needed? N/A    Designated  policy reviewed. Instructed to have someone stay 6 hours post procedure.     COVID policy reviewed.      Medication review:    Medications reviewed. Please see supporting documentation below. Holding recommendations discussed (if applicable).       Prep for procedure:     Procedure prep instructions reviewed.        Additional information needed?  N/A      Patient  verbalized understanding and had no questions or concerns at this time.      Corinne Kliber, RN  Endoscopy Procedure Pre Assessment   [phone] option 4

## 2024-07-03 ENCOUNTER — HOSPITAL ENCOUNTER (OUTPATIENT)
Facility: AMBULATORY SURGERY CENTER | Age: 69
Discharge: HOME | End: 2024-07-03
Attending: STUDENT IN AN ORGANIZED HEALTH CARE EDUCATION/TRAINING PROGRAM | Admitting: STUDENT IN AN ORGANIZED HEALTH CARE EDUCATION/TRAINING PROGRAM
Payer: COMMERCIAL

## 2024-07-03 VITALS
TEMPERATURE: 97.2 F | HEART RATE: 68 BPM | OXYGEN SATURATION: 100 % | DIASTOLIC BLOOD PRESSURE: 98 MMHG | SYSTOLIC BLOOD PRESSURE: 138 MMHG | RESPIRATION RATE: 16 BRPM

## 2024-07-03 DIAGNOSIS — Z12.11 COLON CANCER SCREENING: Primary | ICD-10-CM

## 2024-07-03 LAB — COLONOSCOPY: NORMAL

## 2024-07-03 PROCEDURE — 45385 COLONOSCOPY W/LESION REMOVAL: CPT | Mod: PT

## 2024-07-03 PROCEDURE — G8907 PT DOC NO EVENTS ON DISCHARG: HCPCS

## 2024-07-03 PROCEDURE — G8918 PT W/O PREOP ORDER IV AB PRO: HCPCS

## 2024-07-03 PROCEDURE — 88305 TISSUE EXAM BY PATHOLOGIST: CPT | Performed by: PATHOLOGY

## 2024-07-03 RX ORDER — ONDANSETRON 2 MG/ML
4 INJECTION INTRAMUSCULAR; INTRAVENOUS
Status: DISCONTINUED | OUTPATIENT
Start: 2024-07-03 | End: 2024-07-04 | Stop reason: HOSPADM

## 2024-07-03 RX ORDER — NALOXONE HYDROCHLORIDE 0.4 MG/ML
0.4 INJECTION, SOLUTION INTRAMUSCULAR; INTRAVENOUS; SUBCUTANEOUS
Status: DISCONTINUED | OUTPATIENT
Start: 2024-07-03 | End: 2024-07-04 | Stop reason: HOSPADM

## 2024-07-03 RX ORDER — LIDOCAINE 40 MG/G
CREAM TOPICAL
Status: DISCONTINUED | OUTPATIENT
Start: 2024-07-03 | End: 2024-07-04 | Stop reason: HOSPADM

## 2024-07-03 RX ORDER — FLUMAZENIL 0.1 MG/ML
0.2 INJECTION, SOLUTION INTRAVENOUS
Status: ACTIVE | OUTPATIENT
Start: 2024-07-03 | End: 2024-07-03

## 2024-07-03 RX ORDER — NALOXONE HYDROCHLORIDE 0.4 MG/ML
0.2 INJECTION, SOLUTION INTRAMUSCULAR; INTRAVENOUS; SUBCUTANEOUS
Status: DISCONTINUED | OUTPATIENT
Start: 2024-07-03 | End: 2024-07-04 | Stop reason: HOSPADM

## 2024-07-03 RX ORDER — FENTANYL CITRATE 50 UG/ML
INJECTION, SOLUTION INTRAMUSCULAR; INTRAVENOUS PRN
Status: DISCONTINUED | OUTPATIENT
Start: 2024-07-03 | End: 2024-07-03 | Stop reason: HOSPADM

## 2024-07-03 RX ORDER — ONDANSETRON 2 MG/ML
4 INJECTION INTRAMUSCULAR; INTRAVENOUS EVERY 6 HOURS PRN
Status: DISCONTINUED | OUTPATIENT
Start: 2024-07-03 | End: 2024-07-04 | Stop reason: HOSPADM

## 2024-07-03 RX ORDER — PROCHLORPERAZINE MALEATE 5 MG
5 TABLET ORAL EVERY 6 HOURS PRN
Status: DISCONTINUED | OUTPATIENT
Start: 2024-07-03 | End: 2024-07-04 | Stop reason: HOSPADM

## 2024-07-03 RX ORDER — ONDANSETRON 4 MG/1
4 TABLET, ORALLY DISINTEGRATING ORAL EVERY 6 HOURS PRN
Status: DISCONTINUED | OUTPATIENT
Start: 2024-07-03 | End: 2024-07-04 | Stop reason: HOSPADM

## 2024-07-03 NOTE — H&P
Pre-Endoscopy History and Physical     Shelia Ortiz MRN# [id number]   YOB: 1955 Age: 69 year old     Date of Procedure: 7/3/2024  Primary care provider: Jannet Mijares  Type of Endoscopy: colonoscopy  Reason for Procedure: surveillance - prev tubular adenoma   Type of Anesthesia Anticipated: Moderate Sedation    HPI:    Shelia is a 69 year old male who will be undergoing the above procedure.      A history and physical has been performed. The patient's medications and allergies have been reviewed. The risks and benefits of the procedure and the sedation options and risks were discussed with the patient.  I specifically discussed about possible sedation medication reaction, bleeding, and one of the more rare complications of perforation.  All questions were answered and informed consent was obtained.      He denies a personal or family history of anesthesia complications or bleeding disorders.     No Known Allergies     Cannot display prior to admission medications because the patient has not been admitted in this contact.       Patient Active Problem List   Diagnosis    Hyperlipidemia LDL goal <130    Atopic dermatitis    Elevated blood pressure reading without diagnosis of hypertension    Back pain    S/P laminectomy    Essential hypertension    Schwannomatosis (H)        Past Medical History:   Diagnosis Date    Hyperlipidemia LDL goal <130     Sebaceous cyst 3/20/08    left posterior shoulder        Past Surgical History:   Procedure Laterality Date    COLONOSCOPY  10/24/2013    Procedure: COLONOSCOPY;  Tubular adenoma of colon  pkt sent 10/7  rx sent  ;  Surgeon: Lenin Lopez MD;  Location: [facility] OR    COLONOSCOPY WITH CO2 INSUFFLATION N/A 1/29/2019    Procedure: COLONOSCOPY WITH CO2 INSUFFLATION;  Surgeon: Kaleb Morelos MD;  Location: [facility] OR    HC LAPAROSCOPY, SURGICAL; APPENDECTOMY N/A 11/19/2019    LAMINECTOMY LUMBAR ONE LEVEL N/A 10/4/2019    Procedure: L1-L2 LAMINECTOMIES  AND  "RESECTION OF INTRADURAL MASS WITH SPINAL MONITORING;  Surgeon: Paco Mittal MD;  Location: [facility] OR    NO HISTORY OF SURGERY         Social History     Tobacco Use    Smoking status: Never    Smokeless tobacco: Never   Substance Use Topics    Alcohol use: No       Family History   Problem Relation Age of Onset    Hypertension Mother          age 86-stroke     Diabetes Mother         developed in her 70s, now age 77 or 78    Cerebrovascular Disease Mother     Hypertension Father     Cancer - colorectal Father         age 74 or 75    Hypertension Brother     Asthma No family hx of     C.A.D. No family hx of     Breast Cancer No family hx of     Prostate Cancer No family hx of     Thyroid Disease No family hx of     Osteoporosis No family hx of        REVIEW OF SYSTEMS:     5 point ROS negative except as noted above in HPI, including Gen., Resp., CV, GI &  system review.      PHYSICAL EXAM:   BP (!) 123/91   Temp 97.2  F (36.2  C) (Temporal)   Resp 16   SpO2 97%  Estimated body mass index is 24.49 kg/m  as calculated from the following:    Height as of 24: 1.778 m (5' 10\").    Weight as of 24: 77.4 kg (170 lb 11.2 oz).   GENERAL APPEARANCE: healthy, alert, and no distress  MENTAL STATUS: alert  AIRWAY EXAM: Mallampatti Class III (visualization of the soft palate and base of uvula)  RESP: lungs clear to auscultation - no rales, rhonchi or wheezes  CV: regular rates and rhythm, normal S1 S2, no S3 or S4, no murmur, click or rub, and no irregular beats      DIAGNOSTICS:    Not indicated      IMPRESSION   ASA Class 2 - Mild systemic disease        PLAN:       Plan for colonoscopy. We discussed the risks, benefits and alternatives and the patient wished to proceed.    The above has been forwarded to the consulting provider.      Signed Electronically by: TOMMY JOYA MD  July 3, 2024    "

## 2024-07-09 NOTE — RESULT ENCOUNTER NOTE
Shelia,    The type of polyps that were removed during your colonoscopy are tubular adenomas. These are not a cancer.  These are the type of polyps that sometimes will turn into one.  These polyps are out and will not cause you any further problems.  However, you should have another colonoscopy in 5 years to evaluate for other polyps.      Please let me know if you have any questions.       Thank you,    Smith Bryan MD

## 2024-07-22 SDOH — HEALTH STABILITY: PHYSICAL HEALTH: ON AVERAGE, HOW MANY DAYS PER WEEK DO YOU ENGAGE IN MODERATE TO STRENUOUS EXERCISE (LIKE A BRISK WALK)?: 3 DAYS

## 2024-07-22 SDOH — HEALTH STABILITY: PHYSICAL HEALTH: ON AVERAGE, HOW MANY MINUTES DO YOU ENGAGE IN EXERCISE AT THIS LEVEL?: 30 MIN

## 2024-07-22 ASSESSMENT — SOCIAL DETERMINANTS OF HEALTH (SDOH): HOW OFTEN DO YOU GET TOGETHER WITH FRIENDS OR RELATIVES?: TWICE A WEEK

## 2024-07-24 ENCOUNTER — MYC MEDICAL ADVICE (OUTPATIENT)
Dept: FAMILY MEDICINE | Facility: CLINIC | Age: 69
End: 2024-07-24
Payer: COMMERCIAL

## 2024-07-24 DIAGNOSIS — I10 ESSENTIAL HYPERTENSION: ICD-10-CM

## 2024-07-24 RX ORDER — LOSARTAN POTASSIUM 50 MG/1
50 TABLET ORAL DAILY
Qty: 30 TABLET | Refills: 0 | Status: SHIPPED | OUTPATIENT
Start: 2024-07-24 | End: 2024-07-26

## 2024-07-25 ENCOUNTER — TELEPHONE (OUTPATIENT)
Dept: FAMILY MEDICINE | Facility: CLINIC | Age: 69
End: 2024-07-25

## 2024-07-25 ENCOUNTER — OFFICE VISIT (OUTPATIENT)
Dept: FAMILY MEDICINE | Facility: CLINIC | Age: 69
End: 2024-07-25
Attending: FAMILY MEDICINE
Payer: COMMERCIAL

## 2024-07-25 VITALS
SYSTOLIC BLOOD PRESSURE: 127 MMHG | DIASTOLIC BLOOD PRESSURE: 80 MMHG | WEIGHT: 162 LBS | OXYGEN SATURATION: 97 % | TEMPERATURE: 97.8 F | BODY MASS INDEX: 23.19 KG/M2 | HEART RATE: 84 BPM | RESPIRATION RATE: 17 BRPM | HEIGHT: 70 IN

## 2024-07-25 DIAGNOSIS — M10.272 DRUG-INDUCED GOUT INVOLVING TOE OF LEFT FOOT, UNSPECIFIED CHRONICITY: ICD-10-CM

## 2024-07-25 DIAGNOSIS — Z12.5 SCREENING FOR PROSTATE CANCER: ICD-10-CM

## 2024-07-25 DIAGNOSIS — Z13.1 SCREENING FOR DIABETES MELLITUS: ICD-10-CM

## 2024-07-25 DIAGNOSIS — I10 ESSENTIAL HYPERTENSION: ICD-10-CM

## 2024-07-25 DIAGNOSIS — E78.5 HYPERLIPIDEMIA LDL GOAL <130: ICD-10-CM

## 2024-07-25 DIAGNOSIS — Q85.03 SCHWANNOMATOSIS (H): ICD-10-CM

## 2024-07-25 DIAGNOSIS — Z00.00 ENCOUNTER FOR MEDICARE ANNUAL WELLNESS EXAM: Primary | ICD-10-CM

## 2024-07-25 PROCEDURE — 99214 OFFICE O/P EST MOD 30 MIN: CPT | Mod: 25 | Performed by: FAMILY MEDICINE

## 2024-07-25 PROCEDURE — G0402 INITIAL PREVENTIVE EXAM: HCPCS | Performed by: FAMILY MEDICINE

## 2024-07-25 RX ORDER — LOSARTAN POTASSIUM 50 MG/1
50 TABLET ORAL DAILY
Qty: 30 TABLET | Refills: 0 | Status: CANCELLED | OUTPATIENT
Start: 2024-07-25

## 2024-07-25 ASSESSMENT — PAIN SCALES - GENERAL: PAINLEVEL: NO PAIN (0)

## 2024-07-25 NOTE — PROGRESS NOTES
Preventive Care Visit  [facility]  Jannet Mijares MD, Family Medicine  Jul 25, 2024      Assessment & Plan     Encounter for Medicare annual wellness exam  Screening preventive care discussed.  Return to clinic for fasting labs.  RSV vaccine recommended and he will consider getting this at the pharmacy.  COVID-vaccine will be available for the 2024 dose in September.    Hyperlipidemia LDL goal <130  Return to clinic for fasting labs  - Lipid panel reflex to direct LDL Fasting; Future    Drug-induced gout involving toe of left foot, unspecified chronicity  Remain off hydrochlorothiazide.  We will reassess his uric acid level to verify that his return into the normal range off medication.  - Uric acid; Future    Essential hypertension  Blood pressure under good control.  Assess for endorgan damage.  Continue current losartan dosing.  - Comprehensive metabolic panel (BMP + Alb, Alk Phos, ALT, AST, Total. Bili, TP); Future  - CBC with platelets; Future  - Albumin Random Urine Quantitative with Creat Ratio; Future  - losartan (COZAAR) 50 MG tablet; Take 1 tablet (50 mg) by mouth daily    Schwannomatosis (H)  Follow-up MRI is due in August 2025.  He will need to establish care in [place] since he is moving them in the next few days.    Screening for diabetes mellitus  Return to clinic for fasting labs  - Comprehensive metabolic panel (BMP + Alb, Alk Phos, ALT, AST, Total. Bili, TP); Future    Screening for prostate cancer  Screening planned  - PSA, screen; Future    Patient has been advised of split billing requirements and indicates understanding: Yes        Counseling  Appropriate preventive services were addressed with this patient via screening, questionnaire, or discussion as appropriate for fall prevention, nutrition, physical activity, Tobacco-use cessation, weight loss and cognition.  Checklist reviewing preventive services available has been given to the patient.  Reviewed patient's  diet, addressing concerns and/or questions.   He is at risk for lack of exercise and has been provided with information to increase physical activity for the benefit of his well-being.   He is at risk for psychosocial distress and has been provided with information to reduce risk.       Patient Instructions   Labs tomorrow.  Results will be sent when available.  Refills will be updated when results return.       Dakota MONZON is a 69 year old, presenting for the following:  Physical        7/25/2024     3:23 PM   Additional Questions   Roomed by Deja   Accompanied by self         7/25/2024     3:23 PM   Patient Reported Additional Medications   Patient reports taking the following new medications no        Health Care Directive  Patient does not have a Health Care Directive or Living Will: Discussed advance care planning with patient; however, patient declined at this time.    HPI      Hyperlipidemia Follow-Up    Are you regularly taking any medication or supplement to lower your cholesterol?   No  Are you having muscle aches or other side effects that you think could be caused by your cholesterol lowering medication?  No    Hypertension Follow-up    Do you check your blood pressure regularly outside of the clinic? Yes   Are you following a low salt diet? Yes  Are your blood pressures ever more than 140 on the top number (systolic) OR more   than 90 on the bottom number (diastolic), for example 140/90? No    Gout: Reports since changing his antihypertensive medication from a medication containing hydrochlorothiazide he has not had any recurrent episodes of gout.  Episodes were primarily in the first metatarsal joint on either foot.  Uric acid level had dropped significantly by changing his medication.    Schwannomatosis: Surgery with Dr. Mittal in October 2019 for lumbar laminectomy and removal of intradural mass.  He has undergone serial MRIs for follow-up since that time.  Most recently was completed 8/14/23  with a plan for follow-up in 2 years.  He would be due for this next year will need to establish care in [place] for that.        7/22/2024   General Health   How would you rate your overall physical health? Good   Feel stress (tense, anxious, or unable to sleep) Only a little      (!) STRESS CONCERN  - moving.      7/22/2024   Nutrition   Diet: Regular (no restrictions)            7/22/2024   Exercise   Days per week of moderate/strenous exercise 3 days   Average minutes spent exercising at this level 30 min    Walking         7/22/2024   Social Factors   Frequency of gathering with friends or relatives Twice a week   Worry food won't last until get money to buy more No   Food not last or not have enough money for food? No   Do you have housing? (Housing is defined as stable permanent housing and does not include staying ouside in a car, in a tent, in an abandoned building, in an overnight shelter, or couch-surfing.) Yes   Are you worried about losing your housing? No   Lack of transportation? No   Unable to get utilities (heat,electricity)? No            7/22/2024   Fall Risk   Fallen 2 or more times in the past year? No    No   Trouble with walking or balance? No    No       Multiple values from one day are sorted in reverse-chronological order          7/22/2024   Activities of Daily Living- Home Safety   Needs help with the following daily activites None of the above   Safety concerns in the home None of the above            7/22/2024   Dental   Dentist two times every year? Yes            7/22/2024   Hearing Screening   Hearing concerns? None of the above            7/22/2024   Driving Risk Screening   Patient/family members have concerns about driving No            7/22/2024   General Alertness/Fatigue Screening   Have you been more tired than usual lately? No            7/22/2024   Urinary Incontinence Screening   Bothered by leaking urine in past 6 months No            7/22/2024   TB Screening   Were you  born outside of the [place]? Yes            Today's PHQ-2 Score:       7/25/2024     3:12 PM   PHQ-2 ( 1999 Pf[employer])   Q1: Little interest or pleasure in doing things 0   Q2: Feeling down, depressed or hopeless 0   PHQ-2 Score 0   Q1: Little interest or pleasure in doing things Not at all   Q2: Feeling down, depressed or hopeless Not at all   PHQ-2 Score 0           7/22/2024   Substance Use   Alcohol more than 3/day or more than 7/wk No   Do you have a current opioid prescription? No   How severe/bad is pain from 1 to 10? 0/10 (No Pain)   Do you use any other substances recreationally? No        Social History     Tobacco Use    Smoking status: Never     Passive exposure: Never    Smokeless tobacco: Never   Vaping Use    Vaping status: Never Used   Substance Use Topics    Alcohol use: No    Drug use: No           7/22/2024   AAA Screening   Family history of Abdominal Aortic Aneurysm (AAA)? No      Last PSA:   PSA   Date Value Ref Range Status   06/28/2021 0.98 0 - 4 ug/L Final     Comment:     Assay Method:  Chemiluminescence using Siemens Vista analyzer     Prostate Specific Antigen Screen   Date Value Ref Range Status   07/06/2023 1.62 0.00 - 4.50 ng/mL Final   05/23/2022 1.61 0.00 - 4.00 ug/L Final     ASCVD Risk   The 10-year ASCVD risk score (Doris VINSON, et al., 2019) is: 18.4%    Values used to calculate the score:      Age: 69 years      Sex: Male      Is Non- : No      Diabetic: No      Tobacco smoker: No      Systolic Blood Pressure: 127 mmHg      Is BP treated: Yes      HDL Cholesterol: 41 mg/dL      Total Cholesterol: 197 mg/dL    Wt Readings from Last 5 Encounters:   07/25/24 73.5 kg (162 lb)   05/16/24 77.4 kg (170 lb 11.2 oz)   04/15/24 78.9 kg (174 lb)   07/24/23 79.2 kg (174 lb 8 oz)   07/06/23 81.6 kg (179 lb 12.8 oz)   Eating controlled.              Reviewed and updated as needed this visit by Provider   Tobacco  Allergies  Meds   Med Hx  Surg Hx  Fam Hx             Past Medical History:   Diagnosis Date    Hyperlipidemia LDL goal <130     Sebaceous cyst 3/20/08    left posterior shoulder     Past Surgical History:   Procedure Laterality Date    COLONOSCOPY  10/24/2013    Procedure: COLONOSCOPY;  Tubular adenoma of colon  pkt sent 10/7  rx sent  ;  Surgeon: Lenin Lopez MD;  Location: [facility] OR    COLONOSCOPY N/A 7/3/2024    Procedure: COLONOSCOPY, FLEXIBLE, WITH LESION REMOVAL USING SNARE;  Surgeon: Smith Bryan MD;  Location: [facility] OR    COLONOSCOPY WITH CO2 INSUFFLATION N/A 1/29/2019    Procedure: COLONOSCOPY WITH CO2 INSUFFLATION;  Surgeon: Kaleb Morelos MD;  Location: [facility] OR    COLONOSCOPY WITH CO2 INSUFFLATION N/A 7/3/2024    Procedure: Colonoscopy with CO2 insufflation;  Surgeon: Smith Bryan MD;  Location: [facility] OR    HC LAPAROSCOPY, SURGICAL; APPENDECTOMY N/A 11/19/2019    LAMINECTOMY LUMBAR ONE LEVEL N/A 10/4/2019    Procedure: L1-L2 LAMINECTOMIES  AND RESECTION OF INTRADURAL MASS WITH SPINAL MONITORING;  Surgeon: Paco Mittal MD;  Location: [facility] OR    NO HISTORY OF SURGERY       BP Readings from Last 3 Encounters:   07/25/24 127/80   07/03/24 (!) 138/98   05/16/24 119/84    Wt Readings from Last 3 Encounters:   07/25/24 73.5 kg (162 lb)   05/16/24 77.4 kg (170 lb 11.2 oz)   04/15/24 78.9 kg (174 lb)                  Current providers sharing in care for this patient include:  Patient Care Team:  Jannet Mijares MD as PCP - General (Family Medicine)  Jannet Mijares MD as Assigned PCP    The following health maintenance items are reviewed in Epic and correct as of today:  Health Maintenance   Topic Date Due    RSV VACCINE (Pregnancy & 60+) (1 - 1-dose 60+ series) Never done    COVID-19 Vaccine (4 - 2023-24 season) 09/01/2023    LIPID  07/06/2024    ANNUAL REVIEW OF HM ORDERS  07/06/2024    MEDICARE ANNUAL WELLNESS VISIT  07/06/2024    INFLUENZA VACCINE (1) 09/01/2024    FALL RISK ASSESSMENT  07/25/2025    GLUCOSE  05/16/2027     "ADVANCE CARE PLANNING  07/07/2028    COLORECTAL CANCER SCREENING  07/03/2029    DTAP/TDAP/TD IMMUNIZATION (3 - Td or Tdap) 06/28/2031    HEPATITIS C SCREENING  Completed    PHQ-2 (once per calendar year)  Completed    Pneumococcal Vaccine: 65+ Years  Completed    ZOSTER IMMUNIZATION  Completed    IPV IMMUNIZATION  Aged Out    HPV IMMUNIZATION  Aged Out    MENINGITIS IMMUNIZATION  Aged Out    RSV MONOCLONAL ANTIBODY  Aged Out         Review of Systems  Constitutional, HEENT, cardiovascular, pulmonary, GI, , musculoskeletal, neuro, skin, endocrine and psych systems are negative, except as otherwise noted.     Objective    Exam  /80 (BP Location: Right arm, Patient Position: Sitting, Cuff Size: Adult Regular)   Pulse 84   Temp 97.8  F (36.6  C) (Temporal)   Resp 17   Ht 1.778 m (5' 10\")   Wt 73.5 kg (162 lb)   SpO2 97%   BMI 23.24 kg/m     Estimated body mass index is 23.24 kg/m  as calculated from the following:    Height as of this encounter: 1.778 m (5' 10\").    Weight as of this encounter: 73.5 kg (162 lb).    Physical Exam  GENERAL: alert and no distress  EYES: Eyes grossly normal to inspection, PERRL and conjunctivae and sclerae normal  HENT: ear canals and TM's normal, nose and mouth without ulcers or lesions  NECK: no adenopathy, no asymmetry, masses, or scars  RESP: lungs clear to auscultation - no rales, rhonchi or wheezes  CV: regular rate and rhythm, normal S1 S2, no S3 or S4, no murmur, click or rub, no peripheral edema  ABDOMEN: soft, nontender, no hepatosplenomegaly, no masses and bowel sounds normal  MS: no gross musculoskeletal defects noted, no edema  SKIN: no suspicious lesions or rashes  NEURO: Normal strength and tone, mentation intact and speech normal  PSYCH: mentation appears normal, affect normal/bright        7/25/2024   Mini Cog   Clock Draw Score 2 Normal   3 Item Recall 3 objects recalled   Mini Cog Total Score 5            Vision Screen  Vision Screen Results: " Pass      Signed Electronically by: Jannet Mijares MD                This chart was documented by provider using a voice activated software called Dragon in addition to manual typing. There may be vocabulary errors or other grammatical errors due to this.

## 2024-07-25 NOTE — TELEPHONE ENCOUNTER
Routing to provider to review and advise on 90 day refill. Med and pharmacy pended.    Moustapha Powers RN  [facility]

## 2024-07-25 NOTE — CONFIDENTIAL NOTE
Patient just saw Dr. Mijares and he needs his prescription for 90 days for losartan (COZAAR) 50 MG tablet and not 30 days as he is going to [place].  Please advise.  Patient needs this ASAP.    Thank you!  Jeni

## 2024-07-25 NOTE — PATIENT INSTRUCTIONS
Labs tomorrow.  Results will be sent when available.  Refills will be updated when results return.      Patient Education   Preventive Care Advice   This is general advice given by our system to help you stay healthy. However, your care team may have specific advice just for you. Please talk to your care team about your preventive care needs.  Nutrition  Eat 5 or more servings of fruits and vegetables each day.  Try wheat bread, brown rice and whole grain pasta (instead of white bread, rice, and pasta).  Get enough calcium and vitamin D. Check the label on foods and aim for 100% of the RDA (recommended daily allowance).  Lifestyle  Exercise at least 150 minutes each week  (30 minutes a day, 5 days a week).  Do muscle strengthening activities 2 days a week. These help control your weight and prevent disease.  No smoking.  Wear sunscreen to prevent skin cancer.  Have a dental exam and cleaning every 6 months.  Yearly exams  See your health care team every year to talk about:  Any changes in your health.  Any medicines your care team has prescribed.  Preventive care, family planning, and ways to prevent chronic diseases.  Shots (vaccines)   HPV shots (up to age 26), if you've never had them before.  Hepatitis B shots (up to age 59), if you've never had them before.  COVID-19 shot: Get this shot when it's due.  Flu shot: Get a flu shot every year.  Tetanus shot: Get a tetanus shot every 10 years.  Pneumococcal, hepatitis A, and RSV shots: Ask your care team if you need these based on your risk.  Shingles shot (for age 50 and up)  General health tests  Diabetes screening:  Starting at age 35, Get screened for diabetes at least every 3 years.  If you are younger than age 35, ask your care team if you should be screened for diabetes.  Cholesterol test: At age 39, start having a cholesterol test every 5 years, or more often if advised.  Bone density scan (DEXA): At age 50, ask your care team if you should have this scan for  osteoporosis (brittle bones).  Hepatitis C: Get tested at least once in your life.  STIs (sexually transmitted infections)  Before age 24: Ask your care team if you should be screened for STIs.  After age 24: Get screened for STIs if you're at risk. You are at risk for STIs (including HIV) if:  You are sexually active with more than one person.  You don't use condoms every time.  You or a partner was diagnosed with a sexually transmitted infection.  If you are at risk for HIV, ask about PrEP medicine to prevent HIV.  Get tested for HIV at least once in your life, whether you are at risk for HIV or not.  Cancer screening tests  Cervical cancer screening: If you have a cervix, begin getting regular cervical cancer screening tests starting at age 21.  Breast cancer scan (mammogram): If you've ever had breasts, begin having regular mammograms starting at age 40. This is a scan to check for breast cancer.  Colon cancer screening: It is important to start screening for colon cancer at age 45.  Have a colonoscopy test every 10 years (or more often if you're at risk) Or, ask your provider about stool tests like a FIT test every year or Cologuard test every 3 years.  To learn more about your testing options, visit:   .  For help making a decision, visit:   https://bit.ly/kf91036.  Prostate cancer screening test: If you have a prostate, ask your care team if a prostate cancer screening test (PSA) at age 55 is right for you.  Lung cancer screening: If you are a current or former smoker ages 50 to 80, ask your care team if ongoing lung cancer screenings are right for you.  For informational purposes only. Not to replace the advice of your health care provider. Copyright   2023 [employer]. All rights reserved. Clinically reviewed by the [facility]. [employer] [id number] - REV 01/24.

## 2024-07-26 ENCOUNTER — LAB (OUTPATIENT)
Dept: LAB | Facility: CLINIC | Age: 69
End: 2024-07-26
Payer: COMMERCIAL

## 2024-07-26 DIAGNOSIS — I10 ESSENTIAL HYPERTENSION: ICD-10-CM

## 2024-07-26 DIAGNOSIS — Z12.5 SCREENING FOR PROSTATE CANCER: ICD-10-CM

## 2024-07-26 DIAGNOSIS — E78.5 HYPERLIPIDEMIA LDL GOAL <130: ICD-10-CM

## 2024-07-26 DIAGNOSIS — M10.272 DRUG-INDUCED GOUT INVOLVING TOE OF LEFT FOOT, UNSPECIFIED CHRONICITY: ICD-10-CM

## 2024-07-26 DIAGNOSIS — Z13.1 SCREENING FOR DIABETES MELLITUS: ICD-10-CM

## 2024-07-26 LAB
ALBUMIN SERPL BCG-MCNC: 4.5 G/DL (ref 3.5–5.2)
ALP SERPL-CCNC: 63 U/L (ref 40–150)
ALT SERPL W P-5'-P-CCNC: 23 U/L (ref 0–70)
ANION GAP SERPL CALCULATED.3IONS-SCNC: 10 MMOL/L (ref 7–15)
AST SERPL W P-5'-P-CCNC: 29 U/L (ref 0–45)
BILIRUB SERPL-MCNC: 0.7 MG/DL
BUN SERPL-MCNC: 14.7 MG/DL (ref 8–23)
CALCIUM SERPL-MCNC: 9 MG/DL (ref 8.8–10.4)
CHLORIDE SERPL-SCNC: 100 MMOL/L (ref 98–107)
CHOLEST SERPL-MCNC: 164 MG/DL
CREAT SERPL-MCNC: 1.16 MG/DL (ref 0.67–1.17)
CREAT UR-MCNC: 74.3 MG/DL
EGFRCR SERPLBLD CKD-EPI 2021: 69 ML/MIN/1.73M2
ERYTHROCYTE [DISTWIDTH] IN BLOOD BY AUTOMATED COUNT: 13.5 % (ref 10–15)
FASTING STATUS PATIENT QL REPORTED: YES
FASTING STATUS PATIENT QL REPORTED: YES
GLUCOSE SERPL-MCNC: 100 MG/DL (ref 70–99)
HCO3 SERPL-SCNC: 27 MMOL/L (ref 22–29)
HCT VFR BLD AUTO: 42.7 % (ref 40–53)
HDLC SERPL-MCNC: 46 MG/DL
HGB BLD-MCNC: 14 G/DL (ref 13.3–17.7)
LDLC SERPL CALC-MCNC: 99 MG/DL
MCH RBC QN AUTO: 29.7 PG (ref 26.5–33)
MCHC RBC AUTO-ENTMCNC: 32.8 G/DL (ref 31.5–36.5)
MCV RBC AUTO: 91 FL (ref 78–100)
MICROALBUMIN UR-MCNC: <12 MG/L
MICROALBUMIN/CREAT UR: NORMAL MG/G{CREAT}
NONHDLC SERPL-MCNC: 118 MG/DL
PLATELET # BLD AUTO: 250 10E3/UL (ref 150–450)
POTASSIUM SERPL-SCNC: 4 MMOL/L (ref 3.4–5.3)
PROT SERPL-MCNC: 7 G/DL (ref 6.4–8.3)
PSA SERPL DL<=0.01 NG/ML-MCNC: 1.43 NG/ML (ref 0–4.5)
RBC # BLD AUTO: 4.71 10E6/UL (ref 4.4–5.9)
SODIUM SERPL-SCNC: 137 MMOL/L (ref 135–145)
TRIGL SERPL-MCNC: 93 MG/DL
URATE SERPL-MCNC: 6.6 MG/DL (ref 3.4–7)
WBC # BLD AUTO: 5.5 10E3/UL (ref 4–11)

## 2024-07-26 PROCEDURE — 80061 LIPID PANEL: CPT

## 2024-07-26 PROCEDURE — G0103 PSA SCREENING: HCPCS

## 2024-07-26 PROCEDURE — 36415 COLL VENOUS BLD VENIPUNCTURE: CPT

## 2024-07-26 PROCEDURE — 84550 ASSAY OF BLOOD/URIC ACID: CPT

## 2024-07-26 PROCEDURE — 80053 COMPREHEN METABOLIC PANEL: CPT

## 2024-07-26 PROCEDURE — 85027 COMPLETE CBC AUTOMATED: CPT

## 2024-07-26 PROCEDURE — 82570 ASSAY OF URINE CREATININE: CPT

## 2024-07-26 PROCEDURE — 82043 UR ALBUMIN QUANTITATIVE: CPT

## 2024-07-26 RX ORDER — LOSARTAN POTASSIUM 50 MG/1
50 TABLET ORAL DAILY
Qty: 90 TABLET | Refills: 1 | Status: SHIPPED | OUTPATIENT
Start: 2024-07-26

## 2024-07-28 NOTE — RESULT ENCOUNTER NOTE
"Your kidney and liver testing are normal.  Your blood sugar is borderline elevated.  This is in the \"prediabetic\" range.  Exercise and limiting carbohydrate and sugars in diet can be helpful at preventing progression to diabetes.   Your cholesterol levels are normal and improved from previous testing.  Prostate cancer screening is negative/normal.  Uric acid level is now well within the normal range.  This decreases risk for gout episodes in the future.  Urine testing is normal.  No evidence of elevated protein is present.  Blood cell counts are normal.  Refills were updated to the [place] pharmacy for you.  Please call or MyChart message me if you have any questions.      PSK"

## 2024-12-07 DIAGNOSIS — I10 ESSENTIAL HYPERTENSION: ICD-10-CM

## 2024-12-09 RX ORDER — LOSARTAN POTASSIUM 50 MG/1
50 TABLET ORAL DAILY
Qty: 90 TABLET | Refills: 0 | Status: SHIPPED | OUTPATIENT
Start: 2024-12-09

## 2025-03-05 DIAGNOSIS — I10 ESSENTIAL HYPERTENSION: ICD-10-CM

## 2025-03-05 RX ORDER — LOSARTAN POTASSIUM 50 MG/1
50 TABLET ORAL DAILY
Qty: 90 TABLET | Refills: 0 | Status: SHIPPED | OUTPATIENT
Start: 2025-03-05

## 2025-07-14 ENCOUNTER — PATIENT OUTREACH (OUTPATIENT)
Dept: CARE COORDINATION | Facility: CLINIC | Age: 70
End: 2025-07-14
Payer: COMMERCIAL

## (undated) DEVICE — ENDO CATH ESOPH BALLOON 15-16.5-18MMX180CM CRE FIXED WIRE

## (undated) DEVICE — RX SURGIFLO HEMOSTATIC MATRIX W/THROMBIN 8ML 2994

## (undated) DEVICE — ENDO CATH ESOPH BALLOON 12-13.5-15MMX180CM CRE FIXED WIRE

## (undated) DEVICE — ESU PENCIL W/HOLSTER E2350H

## (undated) DEVICE — SWAB PROCTO 16" NON-STERILE

## (undated) DEVICE — LIGHT HANDLE X2

## (undated) DEVICE — GLOVE PROTEXIS W/NEU-THERA 8.5  2D73TE85

## (undated) DEVICE — SU MONOCRYL 4-0 PS-2 18" UND Y496G

## (undated) DEVICE — SPECIMEN CONTAINER 60MLW/10% FORMALIN 59601

## (undated) DEVICE — SU NUROLON 4-0 TF CR 8X18" C584D

## (undated) DEVICE — SPECIMEN TRAP 80ML BUSSE BW407

## (undated) DEVICE — ENDO CATH ESOPH/PYL/COLONIC BALLOON 08-9-10MMX240CM CRE

## (undated) DEVICE — ENDO CATH ESOPH/PYL/COLONIC BALLOON 12-13.5-15MMX240CM CRE

## (undated) DEVICE — SPONGE SURGIFOAM 50

## (undated) DEVICE — ENDO MARKER SPOT 5ML

## (undated) DEVICE — Device

## (undated) DEVICE — ENDO SNARE OVAL 2.5X4.0CM W/25GA NDL

## (undated) DEVICE — DRAPE IOBAN INCISE 23X17" 6650EZ

## (undated) DEVICE — NDL SPINAL 18GA 3.5" 405184

## (undated) DEVICE — PACK SPINE SM CUSTOM SNE15SSFSK

## (undated) DEVICE — MIDAS REX DISSECTING TOOL  14MH30

## (undated) DEVICE — ESU ERBE FIAPC PROBE 2.3MMX220CM

## (undated) DEVICE — POSITIONER PT PRONESAFE HEAD REST W/DERMAPROX INSERT 40599

## (undated) DEVICE — GOWN XLG DISP 9545

## (undated) DEVICE — KIT SEALER DURASEAL EXACT SP HYDROGEL 5ML SGL USE 20-6520

## (undated) DEVICE — ENDO CATH ESOPH BALLOON 10-11-12MMX180CM CRE FIXED WIRE

## (undated) DEVICE — IONM EEG SUPPLIES

## (undated) DEVICE — ENDO CATH ESOPH/PYL/COLONIC BALLOON 10-11-12MMX240CM CRE

## (undated) DEVICE — LINEN TOWEL PACK X5 5464

## (undated) DEVICE — STPL SKIN 35W ROTATING HEAD PRW35

## (undated) DEVICE — ENDO FORCEP ENDOJAW BIOPSY 2.8MMX230CM FB-220U

## (undated) DEVICE — ENDO CATH ESOPH/PYL/COLONIC BALLOON 15-16.5-18MMX240CM CRE

## (undated) DEVICE — ESU GROUND PAD UNIVERSAL W/O CORD

## (undated) DEVICE — DRAPE MICROSCOPE EQUIP 48X120" 6130VL2

## (undated) DEVICE — SOL WATER IRRIG 1000ML BOTTLE 2F7114

## (undated) DEVICE — DRAPE MAYO STAND 23X54 8337

## (undated) DEVICE — GLOVE PROTEXIS BLUE W/NEU-THERA 8.0  2D73EB80

## (undated) DEVICE — ENDO FORCEP ENDOJAW BIOPSY 2.0MMX155CM FB-221K

## (undated) DEVICE — ENDO CATH ESOPH BALLOON 06-7-8MMX180CM CRE FIXED WIRE

## (undated) DEVICE — ENDO CATH ESOPH/PYL/COLONIC BALLOON 18-19-20MMX240CM CRE

## (undated) DEVICE — IONM EEG UP TO 7 HOURS

## (undated) DEVICE — SUCTION CANISTER MEDIVAC LINER 1500ML W/LID 65651-515

## (undated) DEVICE — ENDO CATH ESOPH BALLOON 18-19-20MMX180CM CRE FIXED WIRE

## (undated) DEVICE — SYR INFLATOR AND GAUGE 60ML M00550601

## (undated) DEVICE — SOL WATER IRRIG 1000ML BOTTLE 07139-09

## (undated) DEVICE — DEVICE RETRIEVAL ROTH NET PLATINUM UNIV 2.5MMX230CM 00715050

## (undated) DEVICE — KIT SURGICAL TURNOVER FVSD-01D

## (undated) DEVICE — GLOVE PROTEXIS W/NEU-THERA 7.5  2D73TE75

## (undated) DEVICE — ENDO CATH ESOPH BALLOON 08-9-10MMX180CM CRE FIXED WIRE

## (undated) DEVICE — SYR 50ML SLIP TIP W/O NDL 309654

## (undated) DEVICE — SU VICRYL 0 CT-2 CR 8X18" J727D

## (undated) DEVICE — CUP DENTURE 7.5OZ GOLD DISP

## (undated) DEVICE — TUBING SUCTION SOFT 20'X3/16" 0036570

## (undated) DEVICE — SU VICRYL 2-0 CT-2 CR 8X18" J726D

## (undated) DEVICE — GLOVE PROTEXIS BLUE W/NEU-THERA 8.5  2D73EB85

## (undated) DEVICE — SPECIMEN TRAP VACUUM SUCTION 003984-901

## (undated) DEVICE — SUCTION TIP YANKAUER W/O VENT K86

## (undated) DEVICE — SYR 50ML CATH TIP W/O NDL 309620

## (undated) DEVICE — DRAPE COVER C-ARM SEAMLESS SNAP-KAP 03-KP26 LATEX FREE

## (undated) RX ORDER — HYDROMORPHONE HYDROCHLORIDE 1 MG/ML
INJECTION, SOLUTION INTRAMUSCULAR; INTRAVENOUS; SUBCUTANEOUS
Status: DISPENSED
Start: 2019-10-04

## (undated) RX ORDER — LIDOCAINE HYDROCHLORIDE 20 MG/ML
INJECTION, SOLUTION EPIDURAL; INFILTRATION; INTRACAUDAL; PERINEURAL
Status: DISPENSED
Start: 2019-10-04

## (undated) RX ORDER — FENTANYL CITRATE 50 UG/ML
INJECTION, SOLUTION INTRAMUSCULAR; INTRAVENOUS
Status: DISPENSED
Start: 2019-01-29

## (undated) RX ORDER — SIMETHICONE 40MG/0.6ML
SUSPENSION, DROPS(FINAL DOSAGE FORM)(ML) ORAL
Status: DISPENSED
Start: 2024-07-03

## (undated) RX ORDER — PROPOFOL 10 MG/ML
INJECTION, EMULSION INTRAVENOUS
Status: DISPENSED
Start: 2019-10-04

## (undated) RX ORDER — GINSENG 100 MG
CAPSULE ORAL
Status: DISPENSED
Start: 2019-10-04

## (undated) RX ORDER — HYDRALAZINE HYDROCHLORIDE 20 MG/ML
INJECTION INTRAMUSCULAR; INTRAVENOUS
Status: DISPENSED
Start: 2019-10-04

## (undated) RX ORDER — LABETALOL HYDROCHLORIDE 5 MG/ML
INJECTION, SOLUTION INTRAVENOUS
Status: DISPENSED
Start: 2019-10-04

## (undated) RX ORDER — DEXAMETHASONE SODIUM PHOSPHATE 4 MG/ML
INJECTION, SOLUTION INTRA-ARTICULAR; INTRALESIONAL; INTRAMUSCULAR; INTRAVENOUS; SOFT TISSUE
Status: DISPENSED
Start: 2019-10-04

## (undated) RX ORDER — CEFAZOLIN SODIUM 1 G/3ML
INJECTION, POWDER, FOR SOLUTION INTRAMUSCULAR; INTRAVENOUS
Status: DISPENSED
Start: 2019-10-04

## (undated) RX ORDER — SIMETHICONE 40MG/0.6ML
SUSPENSION, DROPS(FINAL DOSAGE FORM)(ML) ORAL
Status: DISPENSED
Start: 2019-01-29

## (undated) RX ORDER — FENTANYL CITRATE 50 UG/ML
INJECTION, SOLUTION INTRAMUSCULAR; INTRAVENOUS
Status: DISPENSED
Start: 2019-10-04

## (undated) RX ORDER — ONDANSETRON 2 MG/ML
INJECTION INTRAMUSCULAR; INTRAVENOUS
Status: DISPENSED
Start: 2019-10-04

## (undated) RX ORDER — FENTANYL CITRATE 50 UG/ML
INJECTION, SOLUTION INTRAMUSCULAR; INTRAVENOUS
Status: DISPENSED
Start: 2024-07-03

## (undated) RX ORDER — REMIFENTANIL HYDROCHLORIDE 1 MG/ML
INJECTION, POWDER, LYOPHILIZED, FOR SOLUTION INTRAVENOUS
Status: DISPENSED
Start: 2019-10-04

## (undated) RX ORDER — FENTANYL CITRATE 0.05 MG/ML
INJECTION, SOLUTION INTRAMUSCULAR; INTRAVENOUS
Status: DISPENSED
Start: 2019-10-04

## (undated) RX ORDER — GLYCOPYRROLATE 0.2 MG/ML
INJECTION, SOLUTION INTRAMUSCULAR; INTRAVENOUS
Status: DISPENSED
Start: 2019-10-04